# Patient Record
Sex: FEMALE | Race: WHITE | NOT HISPANIC OR LATINO | Employment: OTHER | URBAN - METROPOLITAN AREA
[De-identification: names, ages, dates, MRNs, and addresses within clinical notes are randomized per-mention and may not be internally consistent; named-entity substitution may affect disease eponyms.]

---

## 2017-01-30 RX ORDER — ERGOCALCIFEROL 1.25 MG/1
CAPSULE ORAL
Qty: 12 CAPSULE | Refills: 2 | Status: SHIPPED | OUTPATIENT
Start: 2017-01-30 | End: 2018-03-01 | Stop reason: SDUPTHER

## 2017-01-30 RX ORDER — ERGOCALCIFEROL 1.25 MG/1
CAPSULE ORAL
Qty: 12 CAPSULE | Refills: 2 | Status: SHIPPED | OUTPATIENT
Start: 2017-01-30 | End: 2017-01-30 | Stop reason: SDUPTHER

## 2017-02-03 RX ORDER — LISINOPRIL 5 MG/1
TABLET ORAL
Qty: 90 TABLET | Refills: 2 | Status: SHIPPED | OUTPATIENT
Start: 2017-02-03 | End: 2018-03-19 | Stop reason: SDUPTHER

## 2017-02-13 DIAGNOSIS — R20.0 NUMBNESS OF RIGHT HAND: Primary | ICD-10-CM

## 2017-02-15 DIAGNOSIS — R20.2 HAND PARESTHESIA, UNSPECIFIED LATERALITY: Primary | ICD-10-CM

## 2017-02-15 RX ORDER — GABAPENTIN 100 MG/1
CAPSULE ORAL
Qty: 90 CAPSULE | Refills: 2 | Status: SHIPPED | OUTPATIENT
Start: 2017-02-15 | End: 2017-05-31

## 2017-03-03 ENCOUNTER — OFFICE VISIT (OUTPATIENT)
Dept: INTERNAL MEDICINE | Facility: CLINIC | Age: 67
End: 2017-03-03

## 2017-03-03 VITALS
HEART RATE: 68 BPM | DIASTOLIC BLOOD PRESSURE: 80 MMHG | BODY MASS INDEX: 21.68 KG/M2 | SYSTOLIC BLOOD PRESSURE: 140 MMHG | HEIGHT: 64 IN | WEIGHT: 127 LBS

## 2017-03-03 DIAGNOSIS — E55.9 VITAMIN D DEFICIENCY: ICD-10-CM

## 2017-03-03 DIAGNOSIS — E78.49 OTHER HYPERLIPIDEMIA: Primary | ICD-10-CM

## 2017-03-03 DIAGNOSIS — M79.601 RIGHT ARM PAIN: ICD-10-CM

## 2017-03-03 DIAGNOSIS — K57.30 DIVERTICULOSIS OF LARGE INTESTINE WITHOUT HEMORRHAGE: ICD-10-CM

## 2017-03-03 DIAGNOSIS — F32.89 OTHER DEPRESSION: ICD-10-CM

## 2017-03-03 DIAGNOSIS — I10 ESSENTIAL HYPERTENSION: ICD-10-CM

## 2017-03-03 DIAGNOSIS — F41.9 ANXIETY: ICD-10-CM

## 2017-03-03 PROCEDURE — 99214 OFFICE O/P EST MOD 30 MIN: CPT | Performed by: INTERNAL MEDICINE

## 2017-03-03 NOTE — PROGRESS NOTES
Patient is a 66 y.o. female who is here for a follow up of chronic conditions.  Chief Complaint   Patient presents with   • Hypertension   • Hyperlipidemia         HPI:  Here for f/u.  Having pain in R arm.  Described as an ache and pain.  Some improvement with gabapentin.  Having to add advil.  Has EMG soon.  Sleep is good with the gabapentin.  No fever or chills. No abdominal pains.     History:    Patient Active Problem List   Diagnosis   • Anxiety   • Depression   • Diverticulosis of intestine   • Hyperlipidemia   • Hypertension   • Vitamin D deficiency   • Strain of lumbar paraspinal muscle   • Right arm pain       No past medical history on file.    Past Surgical History   Procedure Laterality Date   • Hysterectomy         Current Outpatient Prescriptions on File Prior to Visit   Medication Sig   • aspirin 81 MG tablet Take 1 tablet by mouth daily.   • atorvastatin (LIPITOR) 10 MG tablet TAKE 1 TABLET EVERY NIGHT AT BEDTIME   • cyclobenzaprine (FLEXERIL) 10 MG tablet Take 1 tablet by mouth 3 (three) times a day as needed for muscle spasms.   • estradiol (ESTRACE) 0.5 MG tablet Take 1 tablet by mouth daily.   • FLUoxetine (PROzac) 20 MG capsule TAKE 1 TABLET EVERY DAY   • gabapentin (NEURONTIN) 100 MG capsule 1 in am and 2 at bedtime   • lisinopril (PRINIVIL,ZESTRIL) 5 MG tablet TAKE 1 TABLET EVERY DAY   • meloxicam (MOBIC) 7.5 MG tablet Take 1 tablet by mouth daily as needed (back pain).   • ondansetron (ZOFRAN) 4 MG tablet Take 1 tablet by mouth Every 8 (Eight) Hours As Needed for nausea or vomiting.   • triamcinolone (KENALOG) 0.5 % cream Apply  topically 2 (Two) Times a Day.   • vitamin D (ERGOCALCIFEROL) 01349 UNITS capsule capsule TAKE 1 CAPSULE BY MOUTH EVERY WEEK     No current facility-administered medications on file prior to visit.        Family History   Problem Relation Age of Onset   • Heart disease Father        Social History     Social History   • Marital status:      Spouse name: N/A  "  • Number of children: N/A   • Years of education: N/A     Occupational History   • Not on file.     Social History Main Topics   • Smoking status: Never Smoker   • Smokeless tobacco: Not on file   • Alcohol use Not on file   • Drug use: Not on file   • Sexual activity: Not on file     Other Topics Concern   • Not on file     Social History Narrative         ROS:    Review of Systems   Constitutional: Positive for fatigue. Negative for chills, diaphoresis, fever and unexpected weight change.   HENT: Negative for congestion, ear pain, hearing loss, nosebleeds, postnasal drip, sinus pressure and sore throat.    Eyes: Negative for pain, discharge and itching.   Respiratory: Negative for cough, chest tightness, shortness of breath and wheezing.    Cardiovascular: Negative for chest pain, palpitations and leg swelling.   Gastrointestinal: Negative for abdominal distention, abdominal pain, blood in stool, constipation, diarrhea, nausea and vomiting.        10/15 colonoscopy without polyps   Endocrine: Negative for polydipsia and polyuria.   Genitourinary: Negative for difficulty urinating, dysuria, frequency and hematuria.   Musculoskeletal: Positive for arthralgias. Negative for back pain, gait problem, joint swelling and myalgias.   Skin: Negative for rash and wound.   Neurological: Positive for numbness. Negative for dizziness, syncope, weakness and headaches.        Memory problems   Psychiatric/Behavioral: Positive for dysphoric mood and sleep disturbance. The patient is not nervous/anxious.        Visit Vitals   • /80   • Pulse 68   • Ht 64\" (162.6 cm)   • Wt 127 lb (57.6 kg)   • BMI 21.8 kg/m2       Physical Exam:    Physical Exam   Constitutional: She is oriented to person, place, and time. She appears well-developed and well-nourished.   HENT:   Head: Normocephalic and atraumatic.   Right Ear: External ear normal.   Left Ear: External ear normal.   Mouth/Throat: Oropharynx is clear and moist.   Eyes: " Conjunctivae and EOM are normal.   Neck: Normal range of motion. Neck supple.   Cardiovascular: Normal rate, regular rhythm and normal heart sounds.    Pulmonary/Chest: Effort normal and breath sounds normal.   Abdominal: Soft. Bowel sounds are normal.   Musculoskeletal: Normal range of motion. She exhibits no tenderness.   Lymphadenopathy:     She has no cervical adenopathy.   Neurological: She is alert and oriented to person, place, and time.   Skin: Skin is warm and dry.   Psychiatric: She has a normal mood and affect. Her behavior is normal. Thought content normal.       Procedure:      Discussion/Summary:  memory loss-improved  hyperlipidemia-labs dw patient  depression-stable on prozac  htn-stable  vit d def-cont replacement  Lumbar strain-advised cont HEP  Diverticulosis-increase fiber  Insomnia-benedryl prn  Right arm pain-f/u EMG and cont gabapentin      labs noted and dw patient      Current Outpatient Prescriptions:   •  aspirin 81 MG tablet, Take 1 tablet by mouth daily., Disp: , Rfl:   •  atorvastatin (LIPITOR) 10 MG tablet, TAKE 1 TABLET EVERY NIGHT AT BEDTIME, Disp: 30 tablet, Rfl: 5  •  cyclobenzaprine (FLEXERIL) 10 MG tablet, Take 1 tablet by mouth 3 (three) times a day as needed for muscle spasms., Disp: 30 tablet, Rfl: 2  •  estradiol (ESTRACE) 0.5 MG tablet, Take 1 tablet by mouth daily., Disp: , Rfl:   •  FLUoxetine (PROzac) 20 MG capsule, TAKE 1 TABLET EVERY DAY, Disp: 90 capsule, Rfl: 2  •  gabapentin (NEURONTIN) 100 MG capsule, 1 in am and 2 at bedtime, Disp: 90 capsule, Rfl: 2  •  lisinopril (PRINIVIL,ZESTRIL) 5 MG tablet, TAKE 1 TABLET EVERY DAY, Disp: 90 tablet, Rfl: 2  •  meloxicam (MOBIC) 7.5 MG tablet, Take 1 tablet by mouth daily as needed (back pain)., Disp: 30 tablet, Rfl: 2  •  ondansetron (ZOFRAN) 4 MG tablet, Take 1 tablet by mouth Every 8 (Eight) Hours As Needed for nausea or vomiting., Disp: 30 tablet, Rfl: 0  •  triamcinolone (KENALOG) 0.5 % cream, Apply  topically 2 (Two)  Times a Day., Disp: 30 g, Rfl: 1  •  vitamin D (ERGOCALCIFEROL) 86244 UNITS capsule capsule, TAKE 1 CAPSULE BY MOUTH EVERY WEEK, Disp: 12 capsule, Rfl: 2        Rebecca was seen today for hypertension and hyperlipidemia.    Diagnoses and all orders for this visit:    Other hyperlipidemia    Essential hypertension    Diverticulosis of large intestine without hemorrhage    Vitamin D deficiency    Anxiety    Other depression    Right arm pain

## 2017-03-07 ENCOUNTER — PROCEDURE VISIT (OUTPATIENT)
Dept: NEUROLOGY | Facility: CLINIC | Age: 67
End: 2017-03-07

## 2017-03-07 DIAGNOSIS — M54.12 CERVICAL RADICULOPATHY: ICD-10-CM

## 2017-03-07 DIAGNOSIS — G56.01 CARPAL TUNNEL SYNDROME OF RIGHT WRIST: Primary | ICD-10-CM

## 2017-03-07 PROCEDURE — 95908 NRV CNDJ TST 3-4 STUDIES: CPT | Performed by: PSYCHIATRY & NEUROLOGY

## 2017-03-07 PROCEDURE — 95886 MUSC TEST DONE W/N TEST COMP: CPT | Performed by: PSYCHIATRY & NEUROLOGY

## 2017-03-07 NOTE — PROGRESS NOTES
Procedure   Procedures   EMG/NCVs of right arm suggest mild CTS. Symptoms and exam suggest C4-5 radiculopathy.

## 2017-04-11 RX ORDER — PROMETHAZINE HYDROCHLORIDE 25 MG/1
25 TABLET ORAL EVERY 8 HOURS PRN
Qty: 30 TABLET | Refills: 1 | Status: SHIPPED | OUTPATIENT
Start: 2017-04-11 | End: 2017-05-31

## 2017-05-22 RX ORDER — FLUOXETINE HYDROCHLORIDE 20 MG/1
CAPSULE ORAL
Qty: 90 CAPSULE | Refills: 1 | Status: SHIPPED | OUTPATIENT
Start: 2017-05-22 | End: 2017-05-31 | Stop reason: SDUPTHER

## 2017-05-31 ENCOUNTER — OFFICE VISIT (OUTPATIENT)
Dept: INTERNAL MEDICINE | Facility: CLINIC | Age: 67
End: 2017-05-31

## 2017-05-31 VITALS
SYSTOLIC BLOOD PRESSURE: 124 MMHG | WEIGHT: 126 LBS | HEART RATE: 68 BPM | BODY MASS INDEX: 21.51 KG/M2 | DIASTOLIC BLOOD PRESSURE: 70 MMHG | HEIGHT: 64 IN

## 2017-05-31 DIAGNOSIS — H91.93 BILATERAL HEARING LOSS: ICD-10-CM

## 2017-05-31 DIAGNOSIS — F32.89 OTHER DEPRESSION: ICD-10-CM

## 2017-05-31 DIAGNOSIS — F41.9 ANXIETY: ICD-10-CM

## 2017-05-31 DIAGNOSIS — I10 ESSENTIAL HYPERTENSION: ICD-10-CM

## 2017-05-31 DIAGNOSIS — J30.1 SEASONAL ALLERGIC RHINITIS DUE TO POLLEN: ICD-10-CM

## 2017-05-31 DIAGNOSIS — E55.9 VITAMIN D DEFICIENCY: ICD-10-CM

## 2017-05-31 DIAGNOSIS — E78.49 OTHER HYPERLIPIDEMIA: Primary | ICD-10-CM

## 2017-05-31 DIAGNOSIS — K57.30 DIVERTICULOSIS OF LARGE INTESTINE WITHOUT HEMORRHAGE: ICD-10-CM

## 2017-05-31 LAB
25(OH)D3 SERPL-MCNC: 88.5 NG/ML
ALBUMIN SERPL-MCNC: 4.2 G/DL (ref 3.2–4.8)
ALBUMIN/GLOB SERPL: 1.2 G/DL (ref 1.5–2.5)
ALP SERPL-CCNC: 104 U/L (ref 25–100)
ALT SERPL W P-5'-P-CCNC: 19 U/L (ref 7–40)
ANION GAP SERPL CALCULATED.3IONS-SCNC: 16 MMOL/L (ref 3–11)
ARTICHOKE IGE QN: 134 MG/DL (ref 0–130)
AST SERPL-CCNC: 27 U/L (ref 0–33)
BILIRUB SERPL-MCNC: 0.5 MG/DL (ref 0.3–1.2)
BUN BLD-MCNC: 15 MG/DL (ref 9–23)
BUN/CREAT SERPL: 18.8 (ref 7–25)
CALCIUM SPEC-SCNC: 9.6 MG/DL (ref 8.7–10.4)
CHLORIDE SERPL-SCNC: 106 MMOL/L (ref 99–109)
CHOLEST SERPL-MCNC: 230 MG/DL (ref 0–200)
CO2 SERPL-SCNC: 19 MMOL/L (ref 20–31)
CREAT BLD-MCNC: 0.8 MG/DL (ref 0.6–1.3)
DEPRECATED RDW RBC AUTO: 44.1 FL (ref 37–54)
ERYTHROCYTE [DISTWIDTH] IN BLOOD BY AUTOMATED COUNT: 12.7 % (ref 11.3–14.5)
GFR SERPL CREATININE-BSD FRML MDRD: 71 ML/MIN/1.73
GLOBULIN UR ELPH-MCNC: 3.4 GM/DL
GLUCOSE BLD-MCNC: 83 MG/DL (ref 70–100)
HCT VFR BLD AUTO: 39.1 % (ref 34.5–44)
HDLC SERPL-MCNC: 75 MG/DL (ref 40–60)
HGB BLD-MCNC: 12.6 G/DL (ref 11.5–15.5)
MCH RBC QN AUTO: 30.6 PG (ref 27–31)
MCHC RBC AUTO-ENTMCNC: 32.2 G/DL (ref 32–36)
MCV RBC AUTO: 94.9 FL (ref 80–99)
PLATELET # BLD AUTO: 291 10*3/MM3 (ref 150–450)
PMV BLD AUTO: 10.4 FL (ref 6–12)
POTASSIUM BLD-SCNC: 4.7 MMOL/L (ref 3.5–5.5)
PROT SERPL-MCNC: 7.6 G/DL (ref 5.7–8.2)
RBC # BLD AUTO: 4.12 10*6/MM3 (ref 3.89–5.14)
SODIUM BLD-SCNC: 141 MMOL/L (ref 132–146)
TRIGL SERPL-MCNC: 93 MG/DL (ref 0–150)
TSH SERPL DL<=0.05 MIU/L-ACNC: 1.93 MIU/ML (ref 0.35–5.35)
VIT B12 BLD-MCNC: 561 PG/ML (ref 211–911)
WBC NRBC COR # BLD: 6.59 10*3/MM3 (ref 3.5–10.8)

## 2017-05-31 PROCEDURE — 84443 ASSAY THYROID STIM HORMONE: CPT | Performed by: INTERNAL MEDICINE

## 2017-05-31 PROCEDURE — 82607 VITAMIN B-12: CPT | Performed by: INTERNAL MEDICINE

## 2017-05-31 PROCEDURE — 80053 COMPREHEN METABOLIC PANEL: CPT | Performed by: INTERNAL MEDICINE

## 2017-05-31 PROCEDURE — 85027 COMPLETE CBC AUTOMATED: CPT | Performed by: INTERNAL MEDICINE

## 2017-05-31 PROCEDURE — 82306 VITAMIN D 25 HYDROXY: CPT | Performed by: INTERNAL MEDICINE

## 2017-05-31 PROCEDURE — 99214 OFFICE O/P EST MOD 30 MIN: CPT | Performed by: INTERNAL MEDICINE

## 2017-05-31 PROCEDURE — 80061 LIPID PANEL: CPT | Performed by: INTERNAL MEDICINE

## 2017-05-31 RX ORDER — FLUOXETINE HYDROCHLORIDE 40 MG/1
40 CAPSULE ORAL DAILY
Qty: 90 CAPSULE | Refills: 3 | Status: SHIPPED | OUTPATIENT
Start: 2017-05-31 | End: 2018-03-01

## 2017-05-31 RX ORDER — FLUTICASONE PROPIONATE 50 MCG
2 SPRAY, SUSPENSION (ML) NASAL DAILY
Qty: 1 EACH | Refills: 0 | Status: SHIPPED | OUTPATIENT
Start: 2017-05-31 | End: 2017-07-05 | Stop reason: SDUPTHER

## 2017-07-05 DIAGNOSIS — J30.1 SEASONAL ALLERGIC RHINITIS DUE TO POLLEN: ICD-10-CM

## 2017-07-05 RX ORDER — FLUTICASONE PROPIONATE 50 MCG
SPRAY, SUSPENSION (ML) NASAL
Qty: 1 BOTTLE | Refills: 5 | Status: SHIPPED | OUTPATIENT
Start: 2017-07-05 | End: 2021-01-01

## 2017-07-20 ENCOUNTER — TELEPHONE (OUTPATIENT)
Dept: INTERNAL MEDICINE | Facility: CLINIC | Age: 67
End: 2017-07-20

## 2017-07-20 RX ORDER — HYDROXYZINE HYDROCHLORIDE 25 MG/1
25 TABLET, FILM COATED ORAL 3 TIMES DAILY PRN
Qty: 30 TABLET | Refills: 0 | Status: SHIPPED | OUTPATIENT
Start: 2017-07-20 | End: 2017-07-27 | Stop reason: SDUPTHER

## 2017-07-20 NOTE — TELEPHONE ENCOUNTER
RAUL WANTS TO KNOW IF DR. HERNANDEZ WILL CALL HER IN SOME MEDS FOR POSION IVY.SHE HAS TRIED EVERYTHING OVER THE COUNTER.

## 2017-07-27 RX ORDER — HYDROXYZINE HYDROCHLORIDE 25 MG/1
TABLET, FILM COATED ORAL
Qty: 45 TABLET | Refills: 0 | Status: SHIPPED | OUTPATIENT
Start: 2017-07-27 | End: 2019-09-03

## 2017-08-30 ENCOUNTER — OFFICE VISIT (OUTPATIENT)
Dept: INTERNAL MEDICINE | Facility: CLINIC | Age: 67
End: 2017-08-30

## 2017-08-30 VITALS
DIASTOLIC BLOOD PRESSURE: 70 MMHG | SYSTOLIC BLOOD PRESSURE: 120 MMHG | HEIGHT: 64 IN | WEIGHT: 124 LBS | HEART RATE: 64 BPM | BODY MASS INDEX: 21.17 KG/M2

## 2017-08-30 DIAGNOSIS — F41.9 ANXIETY: ICD-10-CM

## 2017-08-30 DIAGNOSIS — K57.30 DIVERTICULOSIS OF LARGE INTESTINE WITHOUT HEMORRHAGE: ICD-10-CM

## 2017-08-30 DIAGNOSIS — E78.49 OTHER HYPERLIPIDEMIA: Primary | ICD-10-CM

## 2017-08-30 DIAGNOSIS — F32.89 OTHER DEPRESSION: ICD-10-CM

## 2017-08-30 DIAGNOSIS — I10 ESSENTIAL HYPERTENSION: ICD-10-CM

## 2017-08-30 DIAGNOSIS — J30.1 SEASONAL ALLERGIC RHINITIS DUE TO POLLEN: ICD-10-CM

## 2017-08-30 DIAGNOSIS — E55.9 VITAMIN D DEFICIENCY: ICD-10-CM

## 2017-08-30 LAB
ALBUMIN SERPL-MCNC: 4 G/DL (ref 3.2–4.8)
ALBUMIN/GLOB SERPL: 1.4 G/DL (ref 1.5–2.5)
ALP SERPL-CCNC: 133 U/L (ref 25–100)
ALT SERPL W P-5'-P-CCNC: 19 U/L (ref 7–40)
ANION GAP SERPL CALCULATED.3IONS-SCNC: 3 MMOL/L (ref 3–11)
ARTICHOKE IGE QN: 156 MG/DL (ref 0–130)
AST SERPL-CCNC: 25 U/L (ref 0–33)
BILIRUB SERPL-MCNC: 0.4 MG/DL (ref 0.3–1.2)
BUN BLD-MCNC: 12 MG/DL (ref 9–23)
BUN/CREAT SERPL: 13.3 (ref 7–25)
CALCIUM SPEC-SCNC: 9.2 MG/DL (ref 8.7–10.4)
CHLORIDE SERPL-SCNC: 103 MMOL/L (ref 99–109)
CHOLEST SERPL-MCNC: 211 MG/DL (ref 0–200)
CO2 SERPL-SCNC: 32 MMOL/L (ref 20–31)
CREAT BLD-MCNC: 0.9 MG/DL (ref 0.6–1.3)
GFR SERPL CREATININE-BSD FRML MDRD: 62 ML/MIN/1.73
GLOBULIN UR ELPH-MCNC: 2.8 GM/DL
GLUCOSE BLD-MCNC: 84 MG/DL (ref 70–100)
HDLC SERPL-MCNC: 57 MG/DL (ref 40–60)
POTASSIUM BLD-SCNC: 4.4 MMOL/L (ref 3.5–5.5)
PROT SERPL-MCNC: 6.8 G/DL (ref 5.7–8.2)
SODIUM BLD-SCNC: 138 MMOL/L (ref 132–146)
TRIGL SERPL-MCNC: 90 MG/DL (ref 0–150)

## 2017-08-30 PROCEDURE — 80061 LIPID PANEL: CPT | Performed by: INTERNAL MEDICINE

## 2017-08-30 PROCEDURE — 99214 OFFICE O/P EST MOD 30 MIN: CPT | Performed by: INTERNAL MEDICINE

## 2017-08-30 PROCEDURE — 80053 COMPREHEN METABOLIC PANEL: CPT | Performed by: INTERNAL MEDICINE

## 2017-08-30 PROCEDURE — 36415 COLL VENOUS BLD VENIPUNCTURE: CPT | Performed by: INTERNAL MEDICINE

## 2017-08-30 NOTE — PROGRESS NOTES
Patient is a 66 y.o. female who is here for a follow up of chronic conditions.  Chief Complaint   Patient presents with   • Hypertension   • Hyperlipidemia         HPI:  Here for f/u.  Doing good.  Memory is stable.  Still on the prozac 20 mg and 1/2 of the lipitor.  Back is doing well.  No nausea or emesis.  Occasional dizziness.  No lightheadedness.     History:    Patient Active Problem List   Diagnosis   • Anxiety   • Depression   • Diverticulosis of intestine   • Hyperlipidemia   • Hypertension   • Vitamin D deficiency   • Strain of lumbar paraspinal muscle   • Right arm pain   • Seasonal allergic rhinitis due to pollen   • Bilateral hearing loss       No past medical history on file.    Past Surgical History:   Procedure Laterality Date   • HYSTERECTOMY         Current Outpatient Prescriptions on File Prior to Visit   Medication Sig   • aspirin 81 MG tablet Take 1 tablet by mouth daily.   • atorvastatin (LIPITOR) 10 MG tablet TAKE 1 TABLET EVERY NIGHT AT BEDTIME   • cyclobenzaprine (FLEXERIL) 10 MG tablet Take 1 tablet by mouth 3 (three) times a day as needed for muscle spasms.   • estradiol (ESTRACE) 0.5 MG tablet Take 1 tablet by mouth daily.   • FLUoxetine (PROzac) 40 MG capsule Take 1 capsule by mouth Daily.   • fluticasone (FLONASE) 50 MCG/ACT nasal spray 2 SPRAYS INTO EACH NOSTRIL DAILY FOR 30 DAYS.   • hydrOXYzine (ATARAX) 25 MG tablet 1-2 po tid prn   • lisinopril (PRINIVIL,ZESTRIL) 5 MG tablet TAKE 1 TABLET EVERY DAY   • meloxicam (MOBIC) 7.5 MG tablet Take 1 tablet by mouth daily as needed (back pain).   • triamcinolone (KENALOG) 0.5 % cream Apply  topically 2 (Two) Times a Day.   • vitamin D (ERGOCALCIFEROL) 04451 UNITS capsule capsule TAKE 1 CAPSULE BY MOUTH EVERY WEEK     No current facility-administered medications on file prior to visit.        Family History   Problem Relation Age of Onset   • Heart disease Father        Social History     Social History   • Marital status:      Spouse  "name: N/A   • Number of children: N/A   • Years of education: N/A     Occupational History   • Not on file.     Social History Main Topics   • Smoking status: Never Smoker   • Smokeless tobacco: Not on file   • Alcohol use Not on file   • Drug use: Not on file   • Sexual activity: Not on file     Other Topics Concern   • Not on file     Social History Narrative         ROS:    Review of Systems   Constitutional: Positive for fatigue. Negative for chills, diaphoresis, fever and unexpected weight change.   HENT: Positive for hearing loss. Negative for congestion, ear pain, nosebleeds, postnasal drip, sinus pressure and sore throat.    Eyes: Negative for pain, discharge and itching.   Respiratory: Negative for cough, chest tightness, shortness of breath and wheezing.    Cardiovascular: Negative for chest pain, palpitations and leg swelling.   Gastrointestinal: Negative for abdominal distention, abdominal pain, blood in stool, constipation, diarrhea, nausea and vomiting.        10/15 colonoscopy without polyps   Endocrine: Negative for polydipsia and polyuria.   Genitourinary: Negative for difficulty urinating, dysuria, frequency and hematuria.   Musculoskeletal: Positive for arthralgias. Negative for back pain, gait problem, joint swelling and myalgias.   Skin: Negative for rash and wound.   Allergic/Immunologic: Positive for environmental allergies.   Neurological: Negative for dizziness, syncope, weakness, numbness and headaches.        Memory problems   Psychiatric/Behavioral: Positive for dysphoric mood and sleep disturbance. The patient is not nervous/anxious.        /70  Pulse 64  Ht 64\" (162.6 cm)  Wt 124 lb (56.2 kg)  BMI 21.28 kg/m2    Physical Exam:    Physical Exam   Constitutional: She is oriented to person, place, and time. She appears well-developed and well-nourished.   HENT:   Head: Normocephalic and atraumatic.   Right Ear: External ear normal.   Left Ear: External ear normal.   Mouth/Throat: " Oropharynx is clear and moist.   Eyes: Conjunctivae and EOM are normal.   Neck: Normal range of motion. Neck supple.   Cardiovascular: Normal rate, regular rhythm and normal heart sounds.    Pulmonary/Chest: Effort normal and breath sounds normal.   Abdominal: Soft. Bowel sounds are normal.   Musculoskeletal: Normal range of motion. She exhibits no tenderness.   Lymphadenopathy:     She has no cervical adenopathy.   Neurological: She is alert and oriented to person, place, and time.   Skin: Skin is warm and dry.   Psychiatric: She has a normal mood and affect. Her behavior is normal. Thought content normal.       Procedure:      Discussion/Summary:  memory loss-hearing eval noted  Hearing loss-see above  hyperlipidemia-labs today  depression-increase prozac to 40 mg  htn-stable  vit d def-cont replacement  Lumbar strain-advised cont HEP  Diverticulosis-increase fiber  Insomnia-melatonin prn  Allergic rhinitis-flonase ruma  Right arm pain-improved      labs noted and dw patient, advised to increase lipitor to whole pill      Current Outpatient Prescriptions:   •  aspirin 81 MG tablet, Take 1 tablet by mouth daily., Disp: , Rfl:   •  atorvastatin (LIPITOR) 10 MG tablet, TAKE 1 TABLET EVERY NIGHT AT BEDTIME, Disp: 30 tablet, Rfl: 5  •  cyclobenzaprine (FLEXERIL) 10 MG tablet, Take 1 tablet by mouth 3 (three) times a day as needed for muscle spasms., Disp: 30 tablet, Rfl: 2  •  estradiol (ESTRACE) 0.5 MG tablet, Take 1 tablet by mouth daily., Disp: , Rfl:   •  FLUoxetine (PROzac) 40 MG capsule, Take 1 capsule by mouth Daily., Disp: 90 capsule, Rfl: 3  •  fluticasone (FLONASE) 50 MCG/ACT nasal spray, 2 SPRAYS INTO EACH NOSTRIL DAILY FOR 30 DAYS., Disp: 1 bottle, Rfl: 5  •  hydrOXYzine (ATARAX) 25 MG tablet, 1-2 po tid prn, Disp: 45 tablet, Rfl: 0  •  lisinopril (PRINIVIL,ZESTRIL) 5 MG tablet, TAKE 1 TABLET EVERY DAY, Disp: 90 tablet, Rfl: 2  •  meloxicam (MOBIC) 7.5 MG tablet, Take 1 tablet by mouth daily as needed (back  pain)., Disp: 30 tablet, Rfl: 2  •  triamcinolone (KENALOG) 0.5 % cream, Apply  topically 2 (Two) Times a Day., Disp: 30 g, Rfl: 1  •  vitamin D (ERGOCALCIFEROL) 72625 UNITS capsule capsule, TAKE 1 CAPSULE BY MOUTH EVERY WEEK, Disp: 12 capsule, Rfl: 2        Rebecca was seen today for hypertension and hyperlipidemia.    Diagnoses and all orders for this visit:    Other hyperlipidemia  -     Comprehensive Metabolic Panel  -     Lipid Panel    Essential hypertension    Vitamin D deficiency    Diverticulosis of large intestine without hemorrhage    Anxiety    Other depression    Seasonal allergic rhinitis due to pollen

## 2017-11-27 RX ORDER — ATORVASTATIN CALCIUM 10 MG/1
TABLET, FILM COATED ORAL
Qty: 30 TABLET | Refills: 5 | Status: SHIPPED | OUTPATIENT
Start: 2017-11-27 | End: 2018-04-17 | Stop reason: SDUPTHER

## 2017-11-30 ENCOUNTER — OFFICE VISIT (OUTPATIENT)
Dept: INTERNAL MEDICINE | Facility: CLINIC | Age: 67
End: 2017-11-30

## 2017-11-30 VITALS
WEIGHT: 123 LBS | BODY MASS INDEX: 21 KG/M2 | HEIGHT: 64 IN | SYSTOLIC BLOOD PRESSURE: 110 MMHG | DIASTOLIC BLOOD PRESSURE: 78 MMHG | HEART RATE: 68 BPM

## 2017-11-30 DIAGNOSIS — K57.30 DIVERTICULOSIS OF LARGE INTESTINE WITHOUT HEMORRHAGE: ICD-10-CM

## 2017-11-30 DIAGNOSIS — H91.93 BILATERAL HEARING LOSS, UNSPECIFIED HEARING LOSS TYPE: ICD-10-CM

## 2017-11-30 DIAGNOSIS — J30.1 SEASONAL ALLERGIC RHINITIS DUE TO POLLEN, UNSPECIFIED CHRONICITY: ICD-10-CM

## 2017-11-30 DIAGNOSIS — I10 ESSENTIAL HYPERTENSION: Primary | ICD-10-CM

## 2017-11-30 DIAGNOSIS — E78.49 OTHER HYPERLIPIDEMIA: ICD-10-CM

## 2017-11-30 DIAGNOSIS — E55.9 VITAMIN D DEFICIENCY: ICD-10-CM

## 2017-11-30 DIAGNOSIS — R41.3 MEMORY IMPAIRMENT: ICD-10-CM

## 2017-11-30 DIAGNOSIS — F32.89 OTHER DEPRESSION: ICD-10-CM

## 2017-11-30 DIAGNOSIS — R79.89 ABNORMAL LIVER FUNCTION TEST: ICD-10-CM

## 2017-11-30 LAB
ALBUMIN SERPL-MCNC: 4.3 G/DL (ref 3.2–4.8)
ALBUMIN/GLOB SERPL: 1.5 G/DL (ref 1.5–2.5)
ALP SERPL-CCNC: 122 U/L (ref 25–100)
ALT SERPL W P-5'-P-CCNC: 65 U/L (ref 7–40)
ANION GAP SERPL CALCULATED.3IONS-SCNC: 8 MMOL/L (ref 3–11)
ARTICHOKE IGE QN: 147 MG/DL (ref 0–130)
AST SERPL-CCNC: 50 U/L (ref 0–33)
BILIRUB SERPL-MCNC: 0.5 MG/DL (ref 0.3–1.2)
BUN BLD-MCNC: 15 MG/DL (ref 9–23)
BUN/CREAT SERPL: 18.8 (ref 7–25)
CALCIUM SPEC-SCNC: 9.3 MG/DL (ref 8.7–10.4)
CHLORIDE SERPL-SCNC: 105 MMOL/L (ref 99–109)
CHOLEST SERPL-MCNC: 221 MG/DL (ref 0–200)
CO2 SERPL-SCNC: 28 MMOL/L (ref 20–31)
CREAT BLD-MCNC: 0.8 MG/DL (ref 0.6–1.3)
GFR SERPL CREATININE-BSD FRML MDRD: 71 ML/MIN/1.73
GLOBULIN UR ELPH-MCNC: 2.8 GM/DL
GLUCOSE BLD-MCNC: 84 MG/DL (ref 70–100)
HDLC SERPL-MCNC: 69 MG/DL (ref 40–60)
POTASSIUM BLD-SCNC: 4.5 MMOL/L (ref 3.5–5.5)
PROT SERPL-MCNC: 7.1 G/DL (ref 5.7–8.2)
SODIUM BLD-SCNC: 141 MMOL/L (ref 132–146)
TRIGL SERPL-MCNC: 112 MG/DL (ref 0–150)

## 2017-11-30 PROCEDURE — 80061 LIPID PANEL: CPT | Performed by: INTERNAL MEDICINE

## 2017-11-30 PROCEDURE — 99214 OFFICE O/P EST MOD 30 MIN: CPT | Performed by: INTERNAL MEDICINE

## 2017-11-30 PROCEDURE — 80053 COMPREHEN METABOLIC PANEL: CPT | Performed by: INTERNAL MEDICINE

## 2017-11-30 RX ORDER — DONEPEZIL HYDROCHLORIDE 5 MG/1
5 TABLET, FILM COATED ORAL NIGHTLY
Qty: 30 TABLET | Refills: 5 | Status: SHIPPED | OUTPATIENT
Start: 2017-11-30 | End: 2018-03-01

## 2017-11-30 NOTE — PROGRESS NOTES
Patient is a 66 y.o. female who is here for a follow up of chronic conditions.  Chief Complaint   Patient presents with   • Hypertension   • Hyperlipidemia         HPI:  Here for f/u.  Doing ok.  Still with difficulty with memory.  Sleep is difficult.  Back pain and hand pain is improved.  Appetite is good.   No dizziness or lightheadedness.  Problem is more with recent memory.     History:    Patient Active Problem List   Diagnosis   • Anxiety   • Depression   • Diverticulosis of intestine   • Hyperlipidemia   • Hypertension   • Vitamin D deficiency   • Strain of lumbar paraspinal muscle   • Right arm pain   • Seasonal allergic rhinitis due to pollen   • Bilateral hearing loss       No past medical history on file.    Past Surgical History:   Procedure Laterality Date   • HYSTERECTOMY         Current Outpatient Prescriptions on File Prior to Visit   Medication Sig   • aspirin 81 MG tablet Take 1 tablet by mouth daily.   • atorvastatin (LIPITOR) 10 MG tablet TAKE 1 TABLET EVERY NIGHT AT BEDTIME   • estradiol (ESTRACE) 0.5 MG tablet Take 1 tablet by mouth daily.   • FLUoxetine (PROzac) 40 MG capsule Take 1 capsule by mouth Daily.   • fluticasone (FLONASE) 50 MCG/ACT nasal spray 2 SPRAYS INTO EACH NOSTRIL DAILY FOR 30 DAYS.   • hydrOXYzine (ATARAX) 25 MG tablet 1-2 po tid prn   • lisinopril (PRINIVIL,ZESTRIL) 5 MG tablet TAKE 1 TABLET EVERY DAY   • meloxicam (MOBIC) 7.5 MG tablet Take 1 tablet by mouth daily as needed (back pain).   • triamcinolone (KENALOG) 0.5 % cream Apply  topically 2 (Two) Times a Day.   • vitamin D (ERGOCALCIFEROL) 56350 UNITS capsule capsule TAKE 1 CAPSULE BY MOUTH EVERY WEEK     No current facility-administered medications on file prior to visit.        Family History   Problem Relation Age of Onset   • Heart disease Father        Social History     Social History   • Marital status:      Spouse name: N/A   • Number of children: N/A   • Years of education: N/A     Occupational History  "  • Not on file.     Social History Main Topics   • Smoking status: Never Smoker   • Smokeless tobacco: Not on file   • Alcohol use Not on file   • Drug use: Not on file   • Sexual activity: Not on file     Other Topics Concern   • Not on file     Social History Narrative         ROS:    Review of Systems   Constitutional: Positive for fatigue. Negative for chills, diaphoresis, fever and unexpected weight change.   HENT: Negative for congestion, ear pain, hearing loss, nosebleeds, postnasal drip, sinus pressure and sore throat.    Eyes: Negative for pain, discharge and itching.   Respiratory: Negative for cough, chest tightness, shortness of breath and wheezing.    Cardiovascular: Negative for chest pain, palpitations and leg swelling.   Gastrointestinal: Negative for abdominal distention, abdominal pain, blood in stool, constipation, diarrhea, nausea and vomiting.        10/15 colonoscopy without polyps   Endocrine: Negative for polydipsia and polyuria.   Genitourinary: Negative for difficulty urinating, dysuria, frequency and hematuria.   Musculoskeletal: Positive for arthralgias. Negative for back pain, gait problem, joint swelling and myalgias.   Skin: Negative for rash and wound.   Allergic/Immunologic: Positive for environmental allergies.   Neurological: Negative for dizziness, syncope, weakness, numbness and headaches.        Memory problems   Psychiatric/Behavioral: Positive for decreased concentration, dysphoric mood and sleep disturbance. The patient is not nervous/anxious.        /78  Pulse 68  Ht 64\" (162.6 cm)  Wt 123 lb (55.8 kg)  BMI 21.11 kg/m2    Physical Exam:    Physical Exam   Constitutional: She is oriented to person, place, and time. She appears well-developed and well-nourished.   HENT:   Head: Normocephalic and atraumatic.   Right Ear: External ear normal.   Left Ear: External ear normal.   Mouth/Throat: Oropharynx is clear and moist.   Eyes: Conjunctivae and EOM are normal. "   Neck: Normal range of motion. Neck supple.   Cardiovascular: Normal rate, regular rhythm and normal heart sounds.    Pulmonary/Chest: Effort normal and breath sounds normal.   Abdominal: Soft. Bowel sounds are normal.   Musculoskeletal: Normal range of motion. She exhibits no tenderness.   Lymphadenopathy:     She has no cervical adenopathy.   Neurological: She is alert and oriented to person, place, and time.   MMSE   Skin: Skin is warm and dry.   Psychiatric: She has a normal mood and affect. Her behavior is normal. Thought content normal.       Procedure:      Discussion/Summary:    memory loss-trial of aricept  Hearing loss-see above  hyperlipidemia-labs today  depression-cont current tx  htn-stable  vit d def-cont replacement  Lumbar strain-advised cont HEP  Diverticulosis-increase fiber  Insomnia-melatonin prn  Allergic rhinitis-flonase ruma  Right arm pain-improved      labs noted and dw patient, will schedule RUQ US sec to elevated lft         Current Outpatient Prescriptions:   •  aspirin 81 MG tablet, Take 1 tablet by mouth daily., Disp: , Rfl:   •  atorvastatin (LIPITOR) 10 MG tablet, TAKE 1 TABLET EVERY NIGHT AT BEDTIME, Disp: 30 tablet, Rfl: 5  •  estradiol (ESTRACE) 0.5 MG tablet, Take 1 tablet by mouth daily., Disp: , Rfl:   •  FLUoxetine (PROzac) 40 MG capsule, Take 1 capsule by mouth Daily., Disp: 90 capsule, Rfl: 3  •  fluticasone (FLONASE) 50 MCG/ACT nasal spray, 2 SPRAYS INTO EACH NOSTRIL DAILY FOR 30 DAYS., Disp: 1 bottle, Rfl: 5  •  hydrOXYzine (ATARAX) 25 MG tablet, 1-2 po tid prn, Disp: 45 tablet, Rfl: 0  •  lisinopril (PRINIVIL,ZESTRIL) 5 MG tablet, TAKE 1 TABLET EVERY DAY, Disp: 90 tablet, Rfl: 2  •  meloxicam (MOBIC) 7.5 MG tablet, Take 1 tablet by mouth daily as needed (back pain)., Disp: 30 tablet, Rfl: 2  •  triamcinolone (KENALOG) 0.5 % cream, Apply  topically 2 (Two) Times a Day., Disp: 30 g, Rfl: 1  •  vitamin D (ERGOCALCIFEROL) 61773 UNITS capsule capsule, TAKE 1 CAPSULE BY MOUTH  EVERY WEEK, Disp: 12 capsule, Rfl: 2  •  donepezil (ARICEPT) 5 MG tablet, Take 1 tablet by mouth Every Night., Disp: 30 tablet, Rfl: 5        Rebecca was seen today for hypertension and hyperlipidemia.    Diagnoses and all orders for this visit:    Essential hypertension    Other hyperlipidemia  -     Comprehensive Metabolic Panel  -     Lipid Panel    Seasonal allergic rhinitis due to pollen, unspecified chronicity    Vitamin D deficiency    Diverticulosis of large intestine without hemorrhage    Bilateral hearing loss, unspecified hearing loss type    Other depression    Memory impairment  -     donepezil (ARICEPT) 5 MG tablet; Take 1 tablet by mouth Every Night.    Abnormal liver function test  -     US Abdomen Limited

## 2017-12-04 ENCOUNTER — APPOINTMENT (OUTPATIENT)
Dept: ULTRASOUND IMAGING | Facility: HOSPITAL | Age: 67
End: 2017-12-04
Attending: INTERNAL MEDICINE

## 2017-12-05 ENCOUNTER — HOSPITAL ENCOUNTER (OUTPATIENT)
Dept: ULTRASOUND IMAGING | Facility: HOSPITAL | Age: 67
Discharge: HOME | End: 2017-12-05
Attending: INTERNAL MEDICINE | Admitting: INTERNAL MEDICINE

## 2017-12-05 PROCEDURE — 76705 ECHO EXAM OF ABDOMEN: CPT

## 2017-12-06 PROBLEM — K80.20 ASYMPTOMATIC GALLSTONES: Status: ACTIVE | Noted: 2017-12-06

## 2017-12-18 ENCOUNTER — TELEPHONE (OUTPATIENT)
Dept: INTERNAL MEDICINE | Facility: CLINIC | Age: 67
End: 2017-12-18

## 2017-12-18 RX ORDER — ONDANSETRON 4 MG/1
4 TABLET, FILM COATED ORAL EVERY 8 HOURS PRN
Qty: 20 TABLET | Refills: 0 | Status: SHIPPED | OUTPATIENT
Start: 2017-12-18 | End: 2019-09-03

## 2017-12-18 NOTE — TELEPHONE ENCOUNTER
HOMER ISAACS PROBABLY HAS THE STOMACH BUG, SHE IS THROWING UP STOMACH UPSET. CAN WE CALL IN SOMETHING FOR HER. PLEASE LET US KNOW.

## 2018-01-29 ENCOUNTER — TELEPHONE (OUTPATIENT)
Dept: INTERNAL MEDICINE | Facility: CLINIC | Age: 68
End: 2018-01-29

## 2018-01-29 DIAGNOSIS — R19.7 DIARRHEA, UNSPECIFIED TYPE: Primary | ICD-10-CM

## 2018-01-29 NOTE — TELEPHONE ENCOUNTER
Spoke to claude and per dr gates advised pt to get stool studies. Will put in order for isiah kenyon

## 2018-01-29 NOTE — TELEPHONE ENCOUNTER
PATIENT IS HAVING DIARRHEA ISSUES ALONG WITH STOMACH CRAMPING THAT'S BEEN GOING ON FOR 6 WEEKS. PATIENT WANTED TO KNOW WHAT ELSE SHE SHOULD DO? PATIENT WANTED YOU TO GIVE HER A CALL.

## 2018-03-01 ENCOUNTER — OFFICE VISIT (OUTPATIENT)
Dept: INTERNAL MEDICINE | Facility: CLINIC | Age: 68
End: 2018-03-01

## 2018-03-01 VITALS
BODY MASS INDEX: 21.17 KG/M2 | WEIGHT: 124 LBS | HEIGHT: 64 IN | SYSTOLIC BLOOD PRESSURE: 120 MMHG | HEART RATE: 64 BPM | DIASTOLIC BLOOD PRESSURE: 74 MMHG

## 2018-03-01 DIAGNOSIS — J30.1 SEASONAL ALLERGIC RHINITIS DUE TO POLLEN, UNSPECIFIED CHRONICITY: ICD-10-CM

## 2018-03-01 DIAGNOSIS — S39.012S STRAIN OF LUMBAR PARASPINOUS MUSCLE, SEQUELA: ICD-10-CM

## 2018-03-01 DIAGNOSIS — K57.30 DIVERTICULOSIS OF LARGE INTESTINE WITHOUT HEMORRHAGE: ICD-10-CM

## 2018-03-01 DIAGNOSIS — F32.89 OTHER DEPRESSION: ICD-10-CM

## 2018-03-01 DIAGNOSIS — I10 ESSENTIAL HYPERTENSION: ICD-10-CM

## 2018-03-01 DIAGNOSIS — E55.9 VITAMIN D DEFICIENCY: ICD-10-CM

## 2018-03-01 DIAGNOSIS — F41.9 ANXIETY: ICD-10-CM

## 2018-03-01 DIAGNOSIS — E78.49 OTHER HYPERLIPIDEMIA: Primary | ICD-10-CM

## 2018-03-01 LAB
ALBUMIN SERPL-MCNC: 4.4 G/DL (ref 3.2–4.8)
ALBUMIN/GLOB SERPL: 1.5 G/DL (ref 1.5–2.5)
ALP SERPL-CCNC: 140 U/L (ref 25–100)
ALT SERPL W P-5'-P-CCNC: 36 U/L (ref 7–40)
ANION GAP SERPL CALCULATED.3IONS-SCNC: 7 MMOL/L (ref 3–11)
ARTICHOKE IGE QN: 120 MG/DL (ref 0–130)
AST SERPL-CCNC: 36 U/L (ref 0–33)
BILIRUB SERPL-MCNC: 0.4 MG/DL (ref 0.3–1.2)
BUN BLD-MCNC: 12 MG/DL (ref 9–23)
BUN/CREAT SERPL: 15 (ref 7–25)
CALCIUM SPEC-SCNC: 9.5 MG/DL (ref 8.7–10.4)
CHLORIDE SERPL-SCNC: 106 MMOL/L (ref 99–109)
CHOLEST SERPL-MCNC: 201 MG/DL (ref 0–200)
CO2 SERPL-SCNC: 28 MMOL/L (ref 20–31)
CREAT BLD-MCNC: 0.8 MG/DL (ref 0.6–1.3)
GFR SERPL CREATININE-BSD FRML MDRD: 71 ML/MIN/1.73
GLOBULIN UR ELPH-MCNC: 3 GM/DL
GLUCOSE BLD-MCNC: 92 MG/DL (ref 70–100)
HDLC SERPL-MCNC: 74 MG/DL (ref 40–60)
POTASSIUM BLD-SCNC: 4.3 MMOL/L (ref 3.5–5.5)
PROT SERPL-MCNC: 7.4 G/DL (ref 5.7–8.2)
SODIUM BLD-SCNC: 141 MMOL/L (ref 132–146)
TRIGL SERPL-MCNC: 99 MG/DL (ref 0–150)

## 2018-03-01 PROCEDURE — 80061 LIPID PANEL: CPT | Performed by: INTERNAL MEDICINE

## 2018-03-01 PROCEDURE — 99214 OFFICE O/P EST MOD 30 MIN: CPT | Performed by: INTERNAL MEDICINE

## 2018-03-01 PROCEDURE — 80053 COMPREHEN METABOLIC PANEL: CPT | Performed by: INTERNAL MEDICINE

## 2018-03-01 RX ORDER — ARIPIPRAZOLE 2 MG/1
2 TABLET ORAL EVERY MORNING
Qty: 30 TABLET | Refills: 5 | Status: SHIPPED | OUTPATIENT
Start: 2018-03-01 | End: 2018-04-19

## 2018-03-01 NOTE — PROGRESS NOTES
Patient is a 67 y.o. female who is here for a follow up of chronic conditions.  Chief Complaint   Patient presents with   • Hypertension   • Hyperlipidemia         HPI:    Here for f/u.  Tried the aricept, but gave her diarrhea initially.  No longer taking.  Does not feel her memory is any worst.  Wants to try conservative approached.  Feels depressed.  Stopped prozac bc it made her sleepy.  Poor sleep.  Using melatonin with some help.  BP has been good.   History:    Patient Active Problem List   Diagnosis   • Anxiety   • Depression   • Diverticulosis of intestine   • Hyperlipidemia   • Hypertension   • Vitamin D deficiency   • Strain of lumbar paraspinal muscle   • Right arm pain   • Seasonal allergic rhinitis due to pollen   • Bilateral hearing loss   • Asymptomatic gallstones       No past medical history on file.    Past Surgical History:   Procedure Laterality Date   • HYSTERECTOMY         Current Outpatient Prescriptions on File Prior to Visit   Medication Sig   • aspirin 81 MG tablet Take 1 tablet by mouth daily.   • atorvastatin (LIPITOR) 10 MG tablet TAKE 1 TABLET EVERY NIGHT AT BEDTIME   • estradiol (ESTRACE) 0.5 MG tablet Take 1 tablet by mouth daily.   • fluticasone (FLONASE) 50 MCG/ACT nasal spray 2 SPRAYS INTO EACH NOSTRIL DAILY FOR 30 DAYS.   • hydrOXYzine (ATARAX) 25 MG tablet 1-2 po tid prn   • lisinopril (PRINIVIL,ZESTRIL) 5 MG tablet TAKE 1 TABLET EVERY DAY   • meloxicam (MOBIC) 7.5 MG tablet Take 1 tablet by mouth daily as needed (back pain).   • ondansetron (ZOFRAN) 4 MG tablet Take 1 tablet by mouth Every 8 (Eight) Hours As Needed for Nausea or Vomiting.   • triamcinolone (KENALOG) 0.5 % cream Apply  topically 2 (Two) Times a Day.   • vitamin D (ERGOCALCIFEROL) 51651 UNITS capsule capsule TAKE 1 CAPSULE BY MOUTH EVERY WEEK   • [DISCONTINUED] donepezil (ARICEPT) 5 MG tablet Take 1 tablet by mouth Every Night.   • [DISCONTINUED] FLUoxetine (PROzac) 40 MG capsule Take 1 capsule by mouth Daily.  "    No current facility-administered medications on file prior to visit.        Family History   Problem Relation Age of Onset   • Heart disease Father        Social History     Social History   • Marital status:      Spouse name: N/A   • Number of children: N/A   • Years of education: N/A     Occupational History   • Not on file.     Social History Main Topics   • Smoking status: Never Smoker   • Smokeless tobacco: Not on file   • Alcohol use Not on file   • Drug use: Not on file   • Sexual activity: Not on file     Other Topics Concern   • Not on file     Social History Narrative         ROS:    Review of Systems   Constitutional: Positive for fatigue. Negative for chills, diaphoresis, fever and unexpected weight change.   HENT: Negative for congestion, ear pain, hearing loss, nosebleeds, postnasal drip, sinus pressure and sore throat.    Eyes: Negative for pain, discharge and itching.   Respiratory: Negative for cough, chest tightness, shortness of breath and wheezing.    Cardiovascular: Negative for chest pain, palpitations and leg swelling.   Gastrointestinal: Negative for abdominal distention, abdominal pain, blood in stool, constipation, diarrhea, nausea and vomiting.        10/15 colonoscopy without polyps   Endocrine: Negative for polydipsia and polyuria.   Genitourinary: Negative for difficulty urinating, dysuria, frequency and hematuria.   Musculoskeletal: Positive for arthralgias. Negative for back pain, gait problem, joint swelling and myalgias.   Skin: Negative for rash and wound.   Allergic/Immunologic: Positive for environmental allergies.   Neurological: Negative for dizziness, syncope, weakness, numbness and headaches.        Memory problems   Psychiatric/Behavioral: Positive for decreased concentration, dysphoric mood and sleep disturbance. The patient is not nervous/anxious.        /74  Pulse 64  Ht 162.6 cm (64.02\")  Wt 56.2 kg (124 lb)  BMI 21.27 kg/m2    Physical " Exam:    Physical Exam   Constitutional: She is oriented to person, place, and time. She appears well-developed and well-nourished.   HENT:   Head: Normocephalic and atraumatic.   Right Ear: External ear normal.   Left Ear: External ear normal.   Mouth/Throat: Oropharynx is clear and moist.   Eyes: Conjunctivae and EOM are normal.   Neck: Normal range of motion. Neck supple.   Cardiovascular: Normal rate, regular rhythm and normal heart sounds.    Pulmonary/Chest: Effort normal and breath sounds normal.   Abdominal: Soft. Bowel sounds are normal.   Musculoskeletal: Normal range of motion. She exhibits no tenderness.   Lymphadenopathy:     She has no cervical adenopathy.   Neurological: She is alert and oriented to person, place, and time.   Skin: Skin is warm and dry.   Psychiatric: She has a normal mood and affect. Her behavior is normal. Thought content normal.       Procedure:      Discussion/Summary:    Hearing loss-improved  hyperlipidemia-labs today  depression-change to abilify  htn-stable  vit d def-cont replacement  Lumbar strain-advised cont HEP  Diverticulosis-increase fiber  Insomnia-melatonin prn  Allergic rhinitis-flonase rx      labs noted and dw patient    Current Outpatient Prescriptions:   •  aspirin 81 MG tablet, Take 1 tablet by mouth daily., Disp: , Rfl:   •  atorvastatin (LIPITOR) 10 MG tablet, TAKE 1 TABLET EVERY NIGHT AT BEDTIME, Disp: 30 tablet, Rfl: 5  •  estradiol (ESTRACE) 0.5 MG tablet, Take 1 tablet by mouth daily., Disp: , Rfl:   •  fluticasone (FLONASE) 50 MCG/ACT nasal spray, 2 SPRAYS INTO EACH NOSTRIL DAILY FOR 30 DAYS., Disp: 1 bottle, Rfl: 5  •  hydrOXYzine (ATARAX) 25 MG tablet, 1-2 po tid prn, Disp: 45 tablet, Rfl: 0  •  lisinopril (PRINIVIL,ZESTRIL) 5 MG tablet, TAKE 1 TABLET EVERY DAY, Disp: 90 tablet, Rfl: 2  •  meloxicam (MOBIC) 7.5 MG tablet, Take 1 tablet by mouth daily as needed (back pain)., Disp: 30 tablet, Rfl: 2  •  ondansetron (ZOFRAN) 4 MG tablet, Take 1 tablet by  mouth Every 8 (Eight) Hours As Needed for Nausea or Vomiting., Disp: 20 tablet, Rfl: 0  •  triamcinolone (KENALOG) 0.5 % cream, Apply  topically 2 (Two) Times a Day., Disp: 30 g, Rfl: 1  •  vitamin D (ERGOCALCIFEROL) 01560 UNITS capsule capsule, TAKE 1 CAPSULE BY MOUTH EVERY WEEK, Disp: 12 capsule, Rfl: 2  •  ARIPiprazole (ABILIFY) 2 MG tablet, Take 1 tablet by mouth Every Morning., Disp: 30 tablet, Rfl: 5        Rebecca was seen today for hypertension and hyperlipidemia.    Diagnoses and all orders for this visit:    Other hyperlipidemia  -     Comprehensive Metabolic Panel  -     Lipid Panel    Essential hypertension    Seasonal allergic rhinitis due to pollen, unspecified chronicity    Diverticulosis of large intestine without hemorrhage    Vitamin D deficiency    Strain of lumbar paraspinous muscle, sequela    Other depression  -     ARIPiprazole (ABILIFY) 2 MG tablet; Take 1 tablet by mouth Every Morning.    Anxiety

## 2018-03-02 RX ORDER — ERGOCALCIFEROL 1.25 MG/1
CAPSULE ORAL
Qty: 12 CAPSULE | Refills: 3 | Status: SHIPPED | OUTPATIENT
Start: 2018-03-02 | End: 2019-06-17 | Stop reason: SDUPTHER

## 2018-03-19 RX ORDER — LISINOPRIL 5 MG/1
TABLET ORAL
Qty: 90 TABLET | Refills: 1 | Status: SHIPPED | OUTPATIENT
Start: 2018-03-19 | End: 2018-09-12 | Stop reason: SDUPTHER

## 2018-04-17 RX ORDER — ATORVASTATIN CALCIUM 10 MG/1
TABLET, FILM COATED ORAL
Qty: 30 TABLET | Refills: 5 | Status: SHIPPED | OUTPATIENT
Start: 2018-04-17 | End: 2018-11-23 | Stop reason: SDUPTHER

## 2018-04-19 ENCOUNTER — OFFICE VISIT (OUTPATIENT)
Dept: INTERNAL MEDICINE | Facility: CLINIC | Age: 68
End: 2018-04-19

## 2018-04-19 VITALS
HEIGHT: 64 IN | DIASTOLIC BLOOD PRESSURE: 70 MMHG | BODY MASS INDEX: 21.34 KG/M2 | HEART RATE: 60 BPM | SYSTOLIC BLOOD PRESSURE: 130 MMHG | WEIGHT: 125 LBS

## 2018-04-19 DIAGNOSIS — E78.49 OTHER HYPERLIPIDEMIA: Primary | ICD-10-CM

## 2018-04-19 DIAGNOSIS — K57.30 DIVERTICULOSIS OF LARGE INTESTINE WITHOUT HEMORRHAGE: ICD-10-CM

## 2018-04-19 DIAGNOSIS — E55.9 VITAMIN D DEFICIENCY: ICD-10-CM

## 2018-04-19 DIAGNOSIS — F41.9 ANXIETY: ICD-10-CM

## 2018-04-19 DIAGNOSIS — F32.89 OTHER DEPRESSION: ICD-10-CM

## 2018-04-19 DIAGNOSIS — I10 ESSENTIAL HYPERTENSION: ICD-10-CM

## 2018-04-19 PROCEDURE — 99214 OFFICE O/P EST MOD 30 MIN: CPT | Performed by: INTERNAL MEDICINE

## 2018-04-19 RX ORDER — DONEPEZIL HYDROCHLORIDE 5 MG/1
TABLET, FILM COATED ORAL
COMMUNITY
Start: 2018-04-17 | End: 2018-10-19

## 2018-04-19 NOTE — PROGRESS NOTES
Patient is a 67 y.o. female who is here for a follow up of chronic conditions.  Chief Complaint   Patient presents with   • Hypertension   • Hyperlipidemia         HPI:    Here for f/u.  Memory is still an issue.  Could not tolerate aricept 5 msec to diarrhea.  Would like to retry.  BP has been good.  Did not try the abilify.  No dizziness or lightheadedness.  No HA's.    History:    Patient Active Problem List   Diagnosis   • Anxiety   • Depression   • Diverticulosis of intestine   • Hyperlipidemia   • Hypertension   • Vitamin D deficiency   • Strain of lumbar paraspinal muscle   • Right arm pain   • Seasonal allergic rhinitis due to pollen   • Bilateral hearing loss   • Asymptomatic gallstones       No past medical history on file.    Past Surgical History:   Procedure Laterality Date   • HYSTERECTOMY         Current Outpatient Prescriptions on File Prior to Visit   Medication Sig   • aspirin 81 MG tablet Take 1 tablet by mouth daily.   • estradiol (ESTRACE) 0.5 MG tablet Take 1 tablet by mouth daily.   • fluticasone (FLONASE) 50 MCG/ACT nasal spray 2 SPRAYS INTO EACH NOSTRIL DAILY FOR 30 DAYS.   • hydrOXYzine (ATARAX) 25 MG tablet 1-2 po tid prn   • lisinopril (PRINIVIL,ZESTRIL) 5 MG tablet TAKE 1 TABLET EVERY DAY   • triamcinolone (KENALOG) 0.5 % cream Apply  topically 2 (Two) Times a Day.   • vitamin D (ERGOCALCIFEROL) 55935 units capsule capsule TAKE 1 CAPSULE BY MOUTH EVERY WEEK   • atorvastatin (LIPITOR) 10 MG tablet TAKE 1 TABLET EVERY NIGHT AT BEDTIME   • ondansetron (ZOFRAN) 4 MG tablet Take 1 tablet by mouth Every 8 (Eight) Hours As Needed for Nausea or Vomiting.   • [DISCONTINUED] ARIPiprazole (ABILIFY) 2 MG tablet Take 1 tablet by mouth Every Morning.   • [DISCONTINUED] meloxicam (MOBIC) 7.5 MG tablet Take 1 tablet by mouth daily as needed (back pain).     No current facility-administered medications on file prior to visit.        Family History   Problem Relation Age of Onset   • Heart disease Father   "      Social History     Social History   • Marital status:      Spouse name: N/A   • Number of children: N/A   • Years of education: N/A     Occupational History   • Not on file.     Social History Main Topics   • Smoking status: Never Smoker   • Smokeless tobacco: Not on file   • Alcohol use Not on file   • Drug use: Unknown   • Sexual activity: Not on file     Other Topics Concern   • Not on file     Social History Narrative   • No narrative on file         ROS:    Review of Systems   Constitutional: Positive for fatigue. Negative for chills, diaphoresis, fever and unexpected weight change.   HENT: Negative for congestion, ear pain, hearing loss, nosebleeds, postnasal drip, sinus pressure and sore throat.    Eyes: Negative for pain, discharge and itching.   Respiratory: Negative for cough, chest tightness, shortness of breath and wheezing.    Cardiovascular: Negative for chest pain, palpitations and leg swelling.   Gastrointestinal: Negative for abdominal distention, abdominal pain, blood in stool, constipation, diarrhea, nausea and vomiting.        10/15 colonoscopy without polyps   Endocrine: Negative for polydipsia and polyuria.   Genitourinary: Negative for difficulty urinating, dysuria, frequency and hematuria.   Musculoskeletal: Positive for arthralgias. Negative for back pain, gait problem, joint swelling and myalgias.   Skin: Negative for rash and wound.   Allergic/Immunologic: Positive for environmental allergies.   Neurological: Negative for dizziness, syncope, weakness, numbness and headaches.        Memory problems   Psychiatric/Behavioral: Positive for decreased concentration, dysphoric mood and sleep disturbance. The patient is not nervous/anxious.        /70   Pulse 60   Ht 162.6 cm (64.02\")   Wt 56.7 kg (125 lb)   BMI 21.45 kg/m²     Physical Exam:    Physical Exam   Constitutional: She is oriented to person, place, and time. She appears well-developed and well-nourished.   HENT: "   Head: Normocephalic and atraumatic.   Right Ear: External ear normal.   Left Ear: External ear normal.   Mouth/Throat: Oropharynx is clear and moist.   Eyes: Conjunctivae and EOM are normal.   Neck: Normal range of motion. Neck supple.   Cardiovascular: Normal rate, regular rhythm and normal heart sounds.    Pulmonary/Chest: Effort normal and breath sounds normal.   Abdominal: Soft. Bowel sounds are normal.   Musculoskeletal: Normal range of motion. She exhibits no tenderness.   Lymphadenopathy:     She has no cervical adenopathy.   Neurological: She is alert and oriented to person, place, and time.   Skin: Skin is warm and dry.   Psychiatric: She has a normal mood and affect. Her behavior is normal. Thought content normal.       Procedure:      Discussion/Summary:    Hearing loss-improved  hyperlipidemia-labs today  depression-change to abilify  htn-stable  vit d def-cont replacement  Lumbar strain-advised cont HEP  Diverticulosis-increase fiber  Insomnia-melatonin prn  Allergic rhinitis-flonase rx      labs noted and dw patient    Current Outpatient Prescriptions:   •  aspirin 81 MG tablet, Take 1 tablet by mouth daily., Disp: , Rfl:   •  estradiol (ESTRACE) 0.5 MG tablet, Take 1 tablet by mouth daily., Disp: , Rfl:   •  fluticasone (FLONASE) 50 MCG/ACT nasal spray, 2 SPRAYS INTO EACH NOSTRIL DAILY FOR 30 DAYS., Disp: 1 bottle, Rfl: 5  •  hydrOXYzine (ATARAX) 25 MG tablet, 1-2 po tid prn, Disp: 45 tablet, Rfl: 0  •  lisinopril (PRINIVIL,ZESTRIL) 5 MG tablet, TAKE 1 TABLET EVERY DAY, Disp: 90 tablet, Rfl: 1  •  triamcinolone (KENALOG) 0.5 % cream, Apply  topically 2 (Two) Times a Day., Disp: 30 g, Rfl: 1  •  vitamin D (ERGOCALCIFEROL) 73264 units capsule capsule, TAKE 1 CAPSULE BY MOUTH EVERY WEEK, Disp: 12 capsule, Rfl: 3  •  atorvastatin (LIPITOR) 10 MG tablet, TAKE 1 TABLET EVERY NIGHT AT BEDTIME, Disp: 30 tablet, Rfl: 5  •  donepezil (ARICEPT) 5 MG tablet, , Disp: , Rfl:   •  ondansetron (ZOFRAN) 4 MG tablet,  Take 1 tablet by mouth Every 8 (Eight) Hours As Needed for Nausea or Vomiting., Disp: 20 tablet, Rfl: 0        Rebecca was seen today for hypertension and hyperlipidemia.    Diagnoses and all orders for this visit:    Other hyperlipidemia    Essential hypertension    Diverticulosis of large intestine without hemorrhage    Vitamin D deficiency    Anxiety    Other depression

## 2018-04-25 ENCOUNTER — TRANSCRIBE ORDERS (OUTPATIENT)
Dept: ADMINISTRATIVE | Facility: HOSPITAL | Age: 68
End: 2018-04-25

## 2018-04-25 DIAGNOSIS — Z12.31 VISIT FOR SCREENING MAMMOGRAM: Primary | ICD-10-CM

## 2018-04-26 ENCOUNTER — TELEPHONE (OUTPATIENT)
Dept: INTERNAL MEDICINE | Facility: CLINIC | Age: 68
End: 2018-04-26

## 2018-05-17 ENCOUNTER — TELEPHONE (OUTPATIENT)
Dept: INTERNAL MEDICINE | Facility: CLINIC | Age: 68
End: 2018-05-17

## 2018-05-17 RX ORDER — ESTRADIOL 0.5 MG/1
0.5 TABLET ORAL DAILY
Qty: 14 TABLET | Refills: 0 | Status: SHIPPED | OUTPATIENT
Start: 2018-05-17 | End: 2021-01-01

## 2018-05-17 NOTE — TELEPHONE ENCOUNTER
Gave  info for dr land.        ----- Message from Delroy Boswell MD sent at 5/16/2018 11:06 AM EDT -----  Ok for 1 week and I can refer to Dr Arredondo is she wants  ----- Message -----  From: Jerilyn Ferraro MA  Sent: 5/16/2018  10:47 AM  To: Delroy Boswell MD    She is having hot flashes really bad some days. She is going to [facility] ([facility] for [facility]). She refused to give her estridiol which is helping her mood and other issues. They are wanting you give her a week supply of estridol until she has mammo but not sure if [facility] will prescribe due to cancer possibilty.

## 2018-05-31 ENCOUNTER — HOSPITAL ENCOUNTER (OUTPATIENT)
Dept: MAMMOGRAPHY | Facility: HOSPITAL | Age: 68
Discharge: HOME | End: 2018-05-31
Admitting: PHYSICIAN ASSISTANT

## 2018-05-31 ENCOUNTER — APPOINTMENT (OUTPATIENT)
Dept: OTHER | Facility: HOSPITAL | Age: 68
End: 2018-05-31

## 2018-05-31 DIAGNOSIS — Z12.31 VISIT FOR SCREENING MAMMOGRAM: ICD-10-CM

## 2018-05-31 DIAGNOSIS — Z92.89 H/O MAMMOGRAM: ICD-10-CM

## 2018-05-31 PROCEDURE — 77063 BREAST TOMOSYNTHESIS BI: CPT | Performed by: RADIOLOGY

## 2018-05-31 PROCEDURE — 77067 SCR MAMMO BI INCL CAD: CPT

## 2018-05-31 PROCEDURE — 77067 SCR MAMMO BI INCL CAD: CPT | Performed by: RADIOLOGY

## 2018-05-31 PROCEDURE — 77063 BREAST TOMOSYNTHESIS BI: CPT

## 2018-06-18 RX ORDER — ESTRADIOL 0.5 MG/1
TABLET ORAL
Qty: 14 TABLET | OUTPATIENT
Start: 2018-06-18

## 2018-07-10 ENCOUNTER — HOSPITAL ENCOUNTER (OUTPATIENT)
Dept: ULTRASOUND IMAGING | Facility: HOSPITAL | Age: 68
Discharge: HOME | End: 2018-07-10

## 2018-07-10 ENCOUNTER — HOSPITAL ENCOUNTER (OUTPATIENT)
Dept: MAMMOGRAPHY | Facility: HOSPITAL | Age: 68
Discharge: HOME | End: 2018-07-10
Admitting: RADIOLOGY

## 2018-07-10 DIAGNOSIS — R92.8 ABNORMAL MAMMOGRAM: ICD-10-CM

## 2018-07-10 PROCEDURE — 76642 ULTRASOUND BREAST LIMITED: CPT | Performed by: RADIOLOGY

## 2018-07-10 PROCEDURE — 77065 DX MAMMO INCL CAD UNI: CPT

## 2018-07-10 PROCEDURE — 77065 DX MAMMO INCL CAD UNI: CPT | Performed by: RADIOLOGY

## 2018-07-10 PROCEDURE — G0279 TOMOSYNTHESIS, MAMMO: HCPCS | Performed by: RADIOLOGY

## 2018-07-10 PROCEDURE — G0279 TOMOSYNTHESIS, MAMMO: HCPCS

## 2018-07-10 PROCEDURE — 76642 ULTRASOUND BREAST LIMITED: CPT

## 2018-07-17 RX ORDER — ESTRADIOL 0.5 MG/1
TABLET ORAL
Qty: 14 TABLET | OUTPATIENT
Start: 2018-07-17

## 2018-07-19 ENCOUNTER — OFFICE VISIT (OUTPATIENT)
Dept: INTERNAL MEDICINE | Facility: CLINIC | Age: 68
End: 2018-07-19

## 2018-07-19 VITALS
BODY MASS INDEX: 22.02 KG/M2 | WEIGHT: 129 LBS | SYSTOLIC BLOOD PRESSURE: 130 MMHG | DIASTOLIC BLOOD PRESSURE: 82 MMHG | HEIGHT: 64 IN | HEART RATE: 64 BPM

## 2018-07-19 DIAGNOSIS — E78.49 OTHER HYPERLIPIDEMIA: Primary | ICD-10-CM

## 2018-07-19 DIAGNOSIS — J30.1 SEASONAL ALLERGIC RHINITIS DUE TO POLLEN, UNSPECIFIED CHRONICITY: ICD-10-CM

## 2018-07-19 DIAGNOSIS — E55.9 VITAMIN D DEFICIENCY: ICD-10-CM

## 2018-07-19 DIAGNOSIS — K57.30 DIVERTICULOSIS OF LARGE INTESTINE WITHOUT HEMORRHAGE: ICD-10-CM

## 2018-07-19 DIAGNOSIS — K80.20 ASYMPTOMATIC GALLSTONES: ICD-10-CM

## 2018-07-19 DIAGNOSIS — I10 ESSENTIAL HYPERTENSION: ICD-10-CM

## 2018-07-19 DIAGNOSIS — F32.89 OTHER DEPRESSION: ICD-10-CM

## 2018-07-19 DIAGNOSIS — F41.9 ANXIETY: ICD-10-CM

## 2018-07-19 PROCEDURE — 99214 OFFICE O/P EST MOD 30 MIN: CPT | Performed by: INTERNAL MEDICINE

## 2018-07-19 RX ORDER — VENLAFAXINE HYDROCHLORIDE 37.5 MG/1
37.5 CAPSULE, EXTENDED RELEASE ORAL EVERY MORNING
Qty: 30 CAPSULE | Refills: 5 | Status: SHIPPED | OUTPATIENT
Start: 2018-07-19 | End: 2018-10-19

## 2018-07-19 RX ORDER — ESTRADIOL 0.5 MG/1
TABLET ORAL
Qty: 14 TABLET | OUTPATIENT
Start: 2018-07-19

## 2018-07-19 NOTE — PROGRESS NOTES
Patient is a 67 y.o. female who is here for a follow up of chronic conditions.  Chief Complaint   Patient presents with   • Hypertension   • Hyperlipidemia         HPI:    Here for f/u.  Continues on 1/2 of aricept bc the whole pill caused diarrhea.  Feels her memory is better.  Hot flashes are bothersome.   Will be seeing GYN soon.  No abdominal pains.  Sleep is not the best.  No edema.   History:    Patient Active Problem List   Diagnosis   • Anxiety   • Depression   • Diverticulosis of intestine   • Hyperlipidemia   • Hypertension   • Vitamin D deficiency   • Strain of lumbar paraspinal muscle   • Right arm pain   • Seasonal allergic rhinitis due to pollen   • Bilateral hearing loss   • Asymptomatic gallstones       No past medical history on file.    Past Surgical History:   Procedure Laterality Date   • HYSTERECTOMY         Current Outpatient Prescriptions on File Prior to Visit   Medication Sig   • aspirin 81 MG tablet Take 1 tablet by mouth daily.   • atorvastatin (LIPITOR) 10 MG tablet TAKE 1 TABLET EVERY NIGHT AT BEDTIME   • donepezil (ARICEPT) 5 MG tablet    • estradiol (ESTRACE) 0.5 MG tablet Take 1 tablet by mouth Daily.   • fluticasone (FLONASE) 50 MCG/ACT nasal spray 2 SPRAYS INTO EACH NOSTRIL DAILY FOR 30 DAYS.   • hydrOXYzine (ATARAX) 25 MG tablet 1-2 po tid prn   • lisinopril (PRINIVIL,ZESTRIL) 5 MG tablet TAKE 1 TABLET EVERY DAY   • ondansetron (ZOFRAN) 4 MG tablet Take 1 tablet by mouth Every 8 (Eight) Hours As Needed for Nausea or Vomiting.   • triamcinolone (KENALOG) 0.5 % cream Apply  topically 2 (Two) Times a Day.   • vitamin D (ERGOCALCIFEROL) 66375 units capsule capsule TAKE 1 CAPSULE BY MOUTH EVERY WEEK     No current facility-administered medications on file prior to visit.        Family History   Problem Relation Age of Onset   • Heart disease Father    • Breast cancer Neg Hx    • Ovarian cancer Neg Hx        Social History     Social History   • Marital status:      Spouse name:  "N/A   • Number of children: N/A   • Years of education: N/A     Occupational History   • Not on file.     Social History Main Topics   • Smoking status: Never Smoker   • Smokeless tobacco: Not on file   • Alcohol use Not on file   • Drug use: Unknown   • Sexual activity: Not on file     Other Topics Concern   • Not on file     Social History Narrative   • No narrative on file         Review of Systems   Constitutional: Positive for fatigue. Negative for chills, diaphoresis, fever and unexpected weight change.   HENT: Negative for congestion, ear pain, hearing loss, nosebleeds, postnasal drip, sinus pressure and sore throat.    Eyes: Negative for pain, discharge and itching.   Respiratory: Negative for cough, chest tightness, shortness of breath and wheezing.    Cardiovascular: Negative for chest pain, palpitations and leg swelling.   Gastrointestinal: Negative for abdominal distention, abdominal pain, blood in stool, constipation, diarrhea, nausea and vomiting.        10/15 colonoscopy without polyps   Endocrine: Positive for heat intolerance. Negative for polydipsia and polyuria.   Genitourinary: Negative for difficulty urinating, dysuria, frequency and hematuria.   Musculoskeletal: Positive for arthralgias. Negative for back pain, gait problem, joint swelling and myalgias.   Skin: Negative for rash and wound.   Allergic/Immunologic: Positive for environmental allergies.   Neurological: Negative for dizziness, syncope, weakness, numbness and headaches.        Memory problems   Psychiatric/Behavioral: Positive for decreased concentration, dysphoric mood and sleep disturbance. The patient is not nervous/anxious.        /82   Pulse 64   Ht 162.6 cm (64.02\")   Wt 58.5 kg (129 lb)   LMP  (LMP Unknown)   BMI 22.13 kg/m²       Physical Exam   Constitutional: She is oriented to person, place, and time. She appears well-developed and well-nourished.   HENT:   Head: Normocephalic and atraumatic.   Right Ear: " External ear normal.   Left Ear: External ear normal.   Mouth/Throat: Oropharynx is clear and moist.   Eyes: Conjunctivae and EOM are normal.   Neck: Normal range of motion. Neck supple.   Cardiovascular: Normal rate, regular rhythm and normal heart sounds.    Pulmonary/Chest: Effort normal and breath sounds normal.   Abdominal: Soft. Bowel sounds are normal.   Musculoskeletal: Normal range of motion. She exhibits no tenderness.   Lymphadenopathy:     She has no cervical adenopathy.   Neurological: She is alert and oriented to person, place, and time.   Skin: Skin is warm and dry.   Psychiatric: She has a normal mood and affect. Her behavior is normal. Thought content normal.       Procedure:      Discussion/Summary:    Hearing loss-improved  hyperlipidemia-labs noted  depression-add effexor  htn-stable  vit d def-cont replacement  Lumbar strain-advised cont HEP  Diverticulosis-increase fiber  Insomnia-melatonin prn  Allergic rhinitis-flonase rx   Memory impairment-will attempt to increase the aricept      labs noted and dw patient    Current Outpatient Prescriptions:   •  aspirin 81 MG tablet, Take 1 tablet by mouth daily., Disp: , Rfl:   •  atorvastatin (LIPITOR) 10 MG tablet, TAKE 1 TABLET EVERY NIGHT AT BEDTIME, Disp: 30 tablet, Rfl: 5  •  donepezil (ARICEPT) 5 MG tablet, , Disp: , Rfl:   •  estradiol (ESTRACE) 0.5 MG tablet, Take 1 tablet by mouth Daily., Disp: 14 tablet, Rfl: 0  •  fluticasone (FLONASE) 50 MCG/ACT nasal spray, 2 SPRAYS INTO EACH NOSTRIL DAILY FOR 30 DAYS., Disp: 1 bottle, Rfl: 5  •  hydrOXYzine (ATARAX) 25 MG tablet, 1-2 po tid prn, Disp: 45 tablet, Rfl: 0  •  lisinopril (PRINIVIL,ZESTRIL) 5 MG tablet, TAKE 1 TABLET EVERY DAY, Disp: 90 tablet, Rfl: 1  •  ondansetron (ZOFRAN) 4 MG tablet, Take 1 tablet by mouth Every 8 (Eight) Hours As Needed for Nausea or Vomiting., Disp: 20 tablet, Rfl: 0  •  triamcinolone (KENALOG) 0.5 % cream, Apply  topically 2 (Two) Times a Day., Disp: 30 g, Rfl: 1  •   vitamin D (ERGOCALCIFEROL) 56034 units capsule capsule, TAKE 1 CAPSULE BY MOUTH EVERY WEEK, Disp: 12 capsule, Rfl: 3  •  venlafaxine XR (EFFEXOR-XR) 37.5 MG 24 hr capsule, Take 1 capsule by mouth Every Morning., Disp: 30 capsule, Rfl: 5        Rebecca was seen today for hypertension and hyperlipidemia.    Diagnoses and all orders for this visit:    Other hyperlipidemia    Essential hypertension    Seasonal allergic rhinitis due to pollen, unspecified chronicity    Asymptomatic gallstones    Diverticulosis of large intestine without hemorrhage    Vitamin D deficiency    Anxiety  -     venlafaxine XR (EFFEXOR-XR) 37.5 MG 24 hr capsule; Take 1 capsule by mouth Every Morning.    Other depression  -     venlafaxine XR (EFFEXOR-XR) 37.5 MG 24 hr capsule; Take 1 capsule by mouth Every Morning.

## 2018-07-26 DIAGNOSIS — F32.89 OTHER DEPRESSION: ICD-10-CM

## 2018-07-26 RX ORDER — FLUOXETINE HYDROCHLORIDE 40 MG/1
40 CAPSULE ORAL DAILY
Qty: 90 CAPSULE | Refills: 1 | Status: SHIPPED | OUTPATIENT
Start: 2018-07-26 | End: 2019-02-19

## 2018-09-12 RX ORDER — LISINOPRIL 5 MG/1
TABLET ORAL
Qty: 90 TABLET | Refills: 1 | Status: SHIPPED | OUTPATIENT
Start: 2018-09-12 | End: 2019-05-31 | Stop reason: SDUPTHER

## 2018-10-19 ENCOUNTER — OFFICE VISIT (OUTPATIENT)
Dept: INTERNAL MEDICINE | Facility: CLINIC | Age: 68
End: 2018-10-19

## 2018-10-19 VITALS
HEIGHT: 64 IN | WEIGHT: 130 LBS | DIASTOLIC BLOOD PRESSURE: 70 MMHG | SYSTOLIC BLOOD PRESSURE: 126 MMHG | BODY MASS INDEX: 22.2 KG/M2 | HEART RATE: 68 BPM

## 2018-10-19 DIAGNOSIS — J30.1 SEASONAL ALLERGIC RHINITIS DUE TO POLLEN: ICD-10-CM

## 2018-10-19 DIAGNOSIS — F32.89 OTHER DEPRESSION: ICD-10-CM

## 2018-10-19 DIAGNOSIS — K57.30 DIVERTICULOSIS OF LARGE INTESTINE WITHOUT HEMORRHAGE: ICD-10-CM

## 2018-10-19 DIAGNOSIS — I10 ESSENTIAL HYPERTENSION: ICD-10-CM

## 2018-10-19 DIAGNOSIS — E78.49 OTHER HYPERLIPIDEMIA: Primary | ICD-10-CM

## 2018-10-19 DIAGNOSIS — E55.9 VITAMIN D DEFICIENCY: ICD-10-CM

## 2018-10-19 DIAGNOSIS — F41.9 ANXIETY: ICD-10-CM

## 2018-10-19 LAB
25(OH)D3 SERPL-MCNC: 92.5 NG/ML
ALBUMIN SERPL-MCNC: 4.13 G/DL (ref 3.2–4.8)
ALBUMIN/GLOB SERPL: 1.7 G/DL (ref 1.5–2.5)
ALP SERPL-CCNC: 110 U/L (ref 25–100)
ALT SERPL W P-5'-P-CCNC: 22 U/L (ref 7–40)
ANION GAP SERPL CALCULATED.3IONS-SCNC: 4 MMOL/L (ref 3–11)
ARTICHOKE IGE QN: 117 MG/DL (ref 0–130)
AST SERPL-CCNC: 28 U/L (ref 0–33)
BILIRUB SERPL-MCNC: 0.5 MG/DL (ref 0.3–1.2)
BUN BLD-MCNC: 13 MG/DL (ref 9–23)
BUN/CREAT SERPL: 15.7 (ref 7–25)
CALCIUM SPEC-SCNC: 9.2 MG/DL (ref 8.7–10.4)
CHLORIDE SERPL-SCNC: 104 MMOL/L (ref 99–109)
CHOLEST SERPL-MCNC: 189 MG/DL (ref 0–200)
CO2 SERPL-SCNC: 32 MMOL/L (ref 20–31)
CREAT BLD-MCNC: 0.83 MG/DL (ref 0.6–1.3)
DEPRECATED RDW RBC AUTO: 43.9 FL (ref 37–54)
ERYTHROCYTE [DISTWIDTH] IN BLOOD BY AUTOMATED COUNT: 13 % (ref 11.3–14.5)
GFR SERPL CREATININE-BSD FRML MDRD: 68 ML/MIN/1.73
GLOBULIN UR ELPH-MCNC: 2.4 GM/DL
GLUCOSE BLD-MCNC: 84 MG/DL (ref 70–100)
HCT VFR BLD AUTO: 39.4 % (ref 34.5–44)
HDLC SERPL-MCNC: 63 MG/DL (ref 40–60)
HGB BLD-MCNC: 12.6 G/DL (ref 11.5–15.5)
MCH RBC QN AUTO: 29.7 PG (ref 27–31)
MCHC RBC AUTO-ENTMCNC: 32 G/DL (ref 32–36)
MCV RBC AUTO: 92.9 FL (ref 80–99)
PLATELET # BLD AUTO: 303 10*3/MM3 (ref 150–450)
PMV BLD AUTO: 10.5 FL (ref 6–12)
POTASSIUM BLD-SCNC: 4.3 MMOL/L (ref 3.5–5.5)
PROT SERPL-MCNC: 6.5 G/DL (ref 5.7–8.2)
RBC # BLD AUTO: 4.24 10*6/MM3 (ref 3.89–5.14)
SODIUM BLD-SCNC: 140 MMOL/L (ref 132–146)
TRIGL SERPL-MCNC: 121 MG/DL (ref 0–150)
TSH SERPL DL<=0.05 MIU/L-ACNC: 1.32 MIU/ML (ref 0.35–5.35)
VIT B12 BLD-MCNC: 844 PG/ML (ref 211–911)
WBC NRBC COR # BLD: 6.68 10*3/MM3 (ref 3.5–10.8)

## 2018-10-19 PROCEDURE — 82306 VITAMIN D 25 HYDROXY: CPT | Performed by: INTERNAL MEDICINE

## 2018-10-19 PROCEDURE — 99214 OFFICE O/P EST MOD 30 MIN: CPT | Performed by: INTERNAL MEDICINE

## 2018-10-19 PROCEDURE — 80061 LIPID PANEL: CPT | Performed by: INTERNAL MEDICINE

## 2018-10-19 PROCEDURE — 85027 COMPLETE CBC AUTOMATED: CPT | Performed by: INTERNAL MEDICINE

## 2018-10-19 PROCEDURE — 80053 COMPREHEN METABOLIC PANEL: CPT | Performed by: INTERNAL MEDICINE

## 2018-10-19 PROCEDURE — 84443 ASSAY THYROID STIM HORMONE: CPT | Performed by: INTERNAL MEDICINE

## 2018-10-19 PROCEDURE — 82607 VITAMIN B-12: CPT | Performed by: INTERNAL MEDICINE

## 2018-10-19 NOTE — PROGRESS NOTES
Patient is a 67 y.o. female who is here for a follow up of chronic conditions.  Chief Complaint   Patient presents with   • Hypertension   • Hyperlipidemia         HPI:    Here for f/u.  Recently placed on ERT and feels better.  Memory is some better.  Could not tolerate the aricept bc of diarrhea.  Sleeping ok with the use of advil pm.  Started back on prozac.     History:    Patient Active Problem List   Diagnosis   • Anxiety   • Depression   • Diverticulosis of intestine   • Hyperlipidemia   • Hypertension   • Vitamin D deficiency   • Strain of lumbar paraspinal muscle   • Right arm pain   • Seasonal allergic rhinitis due to pollen   • Bilateral hearing loss   • Asymptomatic gallstones       No past medical history on file.    Past Surgical History:   Procedure Laterality Date   • HYSTERECTOMY         Current Outpatient Prescriptions on File Prior to Visit   Medication Sig   • aspirin 81 MG tablet Take 1 tablet by mouth daily.   • atorvastatin (LIPITOR) 10 MG tablet TAKE 1 TABLET EVERY NIGHT AT BEDTIME   • estradiol (ESTRACE) 0.5 MG tablet Take 1 tablet by mouth Daily.   • FLUoxetine (PROzac) 40 MG capsule TAKE 1 CAPSULE BY MOUTH DAILY.   • fluticasone (FLONASE) 50 MCG/ACT nasal spray 2 SPRAYS INTO EACH NOSTRIL DAILY FOR 30 DAYS.   • hydrOXYzine (ATARAX) 25 MG tablet 1-2 po tid prn   • lisinopril (PRINIVIL,ZESTRIL) 5 MG tablet TAKE 1 TABLET EVERY DAY   • ondansetron (ZOFRAN) 4 MG tablet Take 1 tablet by mouth Every 8 (Eight) Hours As Needed for Nausea or Vomiting.   • triamcinolone (KENALOG) 0.5 % cream Apply  topically 2 (Two) Times a Day.   • vitamin D (ERGOCALCIFEROL) 01357 units capsule capsule TAKE 1 CAPSULE BY MOUTH EVERY WEEK   • [DISCONTINUED] donepezil (ARICEPT) 5 MG tablet    • [DISCONTINUED] venlafaxine XR (EFFEXOR-XR) 37.5 MG 24 hr capsule Take 1 capsule by mouth Every Morning.     No current facility-administered medications on file prior to visit.        Family History   Problem Relation Age of  "Onset   • Heart disease Father    • Breast cancer Neg Hx    • Ovarian cancer Neg Hx        Social History     Social History   • Marital status:      Spouse name: N/A   • Number of children: N/A   • Years of education: N/A     Occupational History   • Not on file.     Social History Main Topics   • Smoking status: Never Smoker   • Smokeless tobacco: Not on file   • Alcohol use Not on file   • Drug use: Unknown   • Sexual activity: Not on file     Other Topics Concern   • Not on file     Social History Narrative   • No narrative on file         Review of Systems   Constitutional: Positive for fatigue. Negative for chills, diaphoresis, fever and unexpected weight change.   HENT: Negative for congestion, ear pain, hearing loss, nosebleeds, postnasal drip, sinus pressure and sore throat.    Eyes: Negative for pain, discharge and itching.   Respiratory: Negative for cough, chest tightness, shortness of breath and wheezing.    Cardiovascular: Negative for chest pain, palpitations and leg swelling.   Gastrointestinal: Negative for abdominal distention, abdominal pain, blood in stool, constipation, diarrhea, nausea and vomiting.        10/15 colonoscopy without polyps   Endocrine: Negative for polydipsia and polyuria.   Genitourinary: Negative for difficulty urinating, dysuria, frequency and hematuria.   Musculoskeletal: Positive for arthralgias. Negative for back pain, gait problem, joint swelling and myalgias.   Skin: Negative for rash and wound.   Allergic/Immunologic: Positive for environmental allergies.   Neurological: Negative for dizziness, syncope, weakness, numbness and headaches.        Memory problems   Psychiatric/Behavioral: Positive for decreased concentration, dysphoric mood and sleep disturbance. The patient is not nervous/anxious.        /70   Pulse 68   Ht 162.6 cm (64.02\")   Wt 59 kg (130 lb)   LMP  (LMP Unknown)   BMI 22.30 kg/m²       Physical Exam   Constitutional: She is oriented to " person, place, and time. She appears well-developed and well-nourished.   HENT:   Head: Normocephalic and atraumatic.   Right Ear: External ear normal.   Left Ear: External ear normal.   Mouth/Throat: Oropharynx is clear and moist.   Eyes: Conjunctivae and EOM are normal.   Neck: Normal range of motion. Neck supple.   Cardiovascular: Normal rate, regular rhythm and normal heart sounds.    Pulmonary/Chest: Effort normal and breath sounds normal.   Abdominal: Soft. Bowel sounds are normal.   Musculoskeletal: Normal range of motion. She exhibits no tenderness.   Lymphadenopathy:     She has no cervical adenopathy.   Neurological: She is alert and oriented to person, place, and time.   Skin: Skin is warm and dry.   Psychiatric: She has a normal mood and affect. Her behavior is normal. Thought content normal.       Procedure:      Discussion/Summary:  Hearing loss-improved  hyperlipidemia-labs today  Depression-cont prozac  htn-stable  vit d def-recheck  Lumbar strain-advised cont HEP  Diverticulosis-increase fiber  Insomnia-melatonin prn  Allergic rhinitis-flonase prn  Memory impairment-refusing tx      labs noted and dw patient      Current Outpatient Prescriptions:   •  aspirin 81 MG tablet, Take 1 tablet by mouth daily., Disp: , Rfl:   •  atorvastatin (LIPITOR) 10 MG tablet, TAKE 1 TABLET EVERY NIGHT AT BEDTIME, Disp: 30 tablet, Rfl: 5  •  estradiol (ESTRACE) 0.5 MG tablet, Take 1 tablet by mouth Daily., Disp: 14 tablet, Rfl: 0  •  FLUoxetine (PROzac) 40 MG capsule, TAKE 1 CAPSULE BY MOUTH DAILY., Disp: 90 capsule, Rfl: 1  •  fluticasone (FLONASE) 50 MCG/ACT nasal spray, 2 SPRAYS INTO EACH NOSTRIL DAILY FOR 30 DAYS., Disp: 1 bottle, Rfl: 5  •  hydrOXYzine (ATARAX) 25 MG tablet, 1-2 po tid prn, Disp: 45 tablet, Rfl: 0  •  lisinopril (PRINIVIL,ZESTRIL) 5 MG tablet, TAKE 1 TABLET EVERY DAY, Disp: 90 tablet, Rfl: 1  •  ondansetron (ZOFRAN) 4 MG tablet, Take 1 tablet by mouth Every 8 (Eight) Hours As Needed for Nausea or  Vomiting., Disp: 20 tablet, Rfl: 0  •  triamcinolone (KENALOG) 0.5 % cream, Apply  topically 2 (Two) Times a Day., Disp: 30 g, Rfl: 1  •  vitamin D (ERGOCALCIFEROL) 65121 units capsule capsule, TAKE 1 CAPSULE BY MOUTH EVERY WEEK, Disp: 12 capsule, Rfl: 3        Rebecca was seen today for hypertension and hyperlipidemia.    Diagnoses and all orders for this visit:    Other hyperlipidemia  -     Comprehensive Metabolic Panel  -     TSH  -     Lipid Panel    Essential hypertension  -     CBC (No Diff)    Seasonal allergic rhinitis due to pollen    Diverticulosis of large intestine without hemorrhage    Vitamin D deficiency  -     Vitamin D 25 Hydroxy    Anxiety  -     Vitamin B12    Other depression  -     Vitamin B12

## 2018-11-26 RX ORDER — ATORVASTATIN CALCIUM 10 MG/1
TABLET, FILM COATED ORAL
Qty: 30 TABLET | Refills: 5 | Status: SHIPPED | OUTPATIENT
Start: 2018-11-26 | End: 2019-10-01

## 2019-01-31 DIAGNOSIS — R41.3 MEMORY IMPAIRMENT: ICD-10-CM

## 2019-01-31 RX ORDER — DONEPEZIL HYDROCHLORIDE 5 MG/1
5 TABLET, FILM COATED ORAL NIGHTLY
Qty: 30 TABLET | Refills: 5 | Status: SHIPPED | OUTPATIENT
Start: 2019-01-31 | End: 2019-05-31

## 2019-02-19 ENCOUNTER — OFFICE VISIT (OUTPATIENT)
Dept: INTERNAL MEDICINE | Facility: CLINIC | Age: 69
End: 2019-02-19

## 2019-02-19 VITALS
WEIGHT: 130 LBS | HEIGHT: 64 IN | BODY MASS INDEX: 22.2 KG/M2 | HEART RATE: 64 BPM | SYSTOLIC BLOOD PRESSURE: 130 MMHG | DIASTOLIC BLOOD PRESSURE: 78 MMHG

## 2019-02-19 DIAGNOSIS — F41.9 ANXIETY: ICD-10-CM

## 2019-02-19 DIAGNOSIS — Z01.419 ENCNTR FOR GYN EXAM (GENERAL) (ROUTINE) W/O ABN FINDINGS: ICD-10-CM

## 2019-02-19 DIAGNOSIS — H91.93 BILATERAL HEARING LOSS, UNSPECIFIED HEARING LOSS TYPE: ICD-10-CM

## 2019-02-19 DIAGNOSIS — E78.49 OTHER HYPERLIPIDEMIA: ICD-10-CM

## 2019-02-19 DIAGNOSIS — G31.84 MCI (MILD COGNITIVE IMPAIRMENT): ICD-10-CM

## 2019-02-19 DIAGNOSIS — I10 ESSENTIAL HYPERTENSION: Primary | ICD-10-CM

## 2019-02-19 DIAGNOSIS — K57.30 DIVERTICULOSIS OF LARGE INTESTINE WITHOUT HEMORRHAGE: ICD-10-CM

## 2019-02-19 DIAGNOSIS — J30.1 SEASONAL ALLERGIC RHINITIS DUE TO POLLEN: ICD-10-CM

## 2019-02-19 DIAGNOSIS — F32.89 OTHER DEPRESSION: ICD-10-CM

## 2019-02-19 DIAGNOSIS — E55.9 VITAMIN D DEFICIENCY: ICD-10-CM

## 2019-02-19 PROCEDURE — 99214 OFFICE O/P EST MOD 30 MIN: CPT | Performed by: INTERNAL MEDICINE

## 2019-02-19 NOTE — PROGRESS NOTES
Patient is a 68 y.o. female who is here for a follow up of chronic conditions.  Chief Complaint   Patient presents with   • Hyperlipidemia   • Hypertension         HPI:    Here for f/u.  Still has not been seen by GYN.  Doing ok.  Memory is still an issue.  Some days are better than others.  Currently on aricept 5 mg for the past 3-4 weeks.  Some diarrhea.  No depression.     History:    Patient Active Problem List   Diagnosis   • Anxiety   • Depression   • Diverticulosis of intestine   • Hyperlipidemia   • Hypertension   • Vitamin D deficiency   • Strain of lumbar paraspinal muscle   • Right arm pain   • Seasonal allergic rhinitis due to pollen   • Bilateral hearing loss   • Asymptomatic gallstones   • MCI (mild cognitive impairment)       No past medical history on file.    Past Surgical History:   Procedure Laterality Date   • HYSTERECTOMY         Current Outpatient Medications on File Prior to Visit   Medication Sig   • aspirin 81 MG tablet Take 1 tablet by mouth daily.   • atorvastatin (LIPITOR) 10 MG tablet TAKE 1 TABLET EVERY NIGHT AT BEDTIME   • donepezil (ARICEPT) 5 MG tablet TAKE 1 TABLET BY MOUTH EVERY NIGHT.   • estradiol (ESTRACE) 0.5 MG tablet Take 1 tablet by mouth Daily.   • fluticasone (FLONASE) 50 MCG/ACT nasal spray 2 SPRAYS INTO EACH NOSTRIL DAILY FOR 30 DAYS.   • hydrOXYzine (ATARAX) 25 MG tablet 1-2 po tid prn   • lisinopril (PRINIVIL,ZESTRIL) 5 MG tablet TAKE 1 TABLET EVERY DAY   • ondansetron (ZOFRAN) 4 MG tablet Take 1 tablet by mouth Every 8 (Eight) Hours As Needed for Nausea or Vomiting.   • triamcinolone (KENALOG) 0.5 % cream Apply  topically 2 (Two) Times a Day.   • vitamin D (ERGOCALCIFEROL) 14265 units capsule capsule TAKE 1 CAPSULE BY MOUTH EVERY WEEK   • [DISCONTINUED] FLUoxetine (PROzac) 40 MG capsule TAKE 1 CAPSULE BY MOUTH DAILY.     No current facility-administered medications on file prior to visit.        Family History   Problem Relation Age of Onset   • Heart disease Father     • Breast cancer Neg Hx    • Ovarian cancer Neg Hx        Social History     Socioeconomic History   • Marital status:      Spouse name: Not on file   • Number of children: Not on file   • Years of education: Not on file   • Highest education level: Not on file   Social Needs   • Financial resource strain: Not on file   • Food insecurity - worry: Not on file   • Food insecurity - inability: Not on file   • Transportation needs - medical: Not on file   • Transportation needs - non-medical: Not on file   Occupational History   • Not on file   Tobacco Use   • Smoking status: Never Smoker   Substance and Sexual Activity   • Alcohol use: Not on file   • Drug use: Not on file   • Sexual activity: Not on file   Other Topics Concern   • Not on file   Social History Narrative   • Not on file         Review of Systems   Constitutional: Positive for fatigue. Negative for chills, diaphoresis, fever and unexpected weight change.   HENT: Negative for congestion, ear pain, hearing loss, nosebleeds, postnasal drip, sinus pressure and sore throat.    Eyes: Negative for pain, discharge and itching.   Respiratory: Negative for cough, chest tightness, shortness of breath and wheezing.    Cardiovascular: Negative for chest pain, palpitations and leg swelling.   Gastrointestinal: Negative for abdominal distention, abdominal pain, blood in stool, constipation, diarrhea, nausea and vomiting.        10/15 colonoscopy without polyps   Endocrine: Negative for polydipsia and polyuria.   Genitourinary: Negative for difficulty urinating, dysuria, frequency and hematuria.   Musculoskeletal: Positive for arthralgias. Negative for back pain, gait problem, joint swelling and myalgias.   Skin: Negative for rash and wound.   Allergic/Immunologic: Positive for environmental allergies.   Neurological: Negative for dizziness, syncope, weakness, numbness and headaches.        Memory problems   Psychiatric/Behavioral: Positive for decreased  "concentration, dysphoric mood and sleep disturbance. The patient is not nervous/anxious.        /78   Pulse 64   Ht 162.6 cm (64.02\")   Wt 59 kg (130 lb)   LMP  (LMP Unknown)   BMI 22.30 kg/m²       Physical Exam   Constitutional: She is oriented to person, place, and time. She appears well-developed and well-nourished.   HENT:   Head: Normocephalic and atraumatic.   Right Ear: External ear normal.   Left Ear: External ear normal.   Mouth/Throat: Oropharynx is clear and moist.   Eyes: Conjunctivae and EOM are normal.   Neck: Normal range of motion. Neck supple.   Cardiovascular: Normal rate, regular rhythm and normal heart sounds.   Pulmonary/Chest: Effort normal and breath sounds normal.   Abdominal: Soft. Bowel sounds are normal.   Musculoskeletal: Normal range of motion. She exhibits no tenderness.   Lymphadenopathy:     She has no cervical adenopathy.   Neurological: She is alert and oriented to person, place, and time.   Skin: Skin is warm and dry.   Psychiatric: She has a normal mood and affect. Her behavior is normal. Thought content normal.       Procedure:      Discussion/Summary:    Hearing loss-improved  hyperlipidemia-labs noted  Depression-change to trintellix  htn-stable  vit d def-recheck noted  Lumbar strain-advised cont HEP  Diverticulosis-increase fiber  Insomnia-melatonin prn  Allergic rhinitis-flonase prn  Memory impairment-cont aricept      labs noted and dw patient        Current Outpatient Medications:   •  aspirin 81 MG tablet, Take 1 tablet by mouth daily., Disp: , Rfl:   •  atorvastatin (LIPITOR) 10 MG tablet, TAKE 1 TABLET EVERY NIGHT AT BEDTIME, Disp: 30 tablet, Rfl: 5  •  donepezil (ARICEPT) 5 MG tablet, TAKE 1 TABLET BY MOUTH EVERY NIGHT., Disp: 30 tablet, Rfl: 5  •  estradiol (ESTRACE) 0.5 MG tablet, Take 1 tablet by mouth Daily., Disp: 14 tablet, Rfl: 0  •  fluticasone (FLONASE) 50 MCG/ACT nasal spray, 2 SPRAYS INTO EACH NOSTRIL DAILY FOR 30 DAYS., Disp: 1 bottle, Rfl: 5  • "  hydrOXYzine (ATARAX) 25 MG tablet, 1-2 po tid prn, Disp: 45 tablet, Rfl: 0  •  lisinopril (PRINIVIL,ZESTRIL) 5 MG tablet, TAKE 1 TABLET EVERY DAY, Disp: 90 tablet, Rfl: 1  •  ondansetron (ZOFRAN) 4 MG tablet, Take 1 tablet by mouth Every 8 (Eight) Hours As Needed for Nausea or Vomiting., Disp: 20 tablet, Rfl: 0  •  triamcinolone (KENALOG) 0.5 % cream, Apply  topically 2 (Two) Times a Day., Disp: 30 g, Rfl: 1  •  vitamin D (ERGOCALCIFEROL) 30572 units capsule capsule, TAKE 1 CAPSULE BY MOUTH EVERY WEEK, Disp: 12 capsule, Rfl: 3  •  Vortioxetine HBr (TRINTELLIX) 5 MG tablet, Take 5 mg by mouth Daily With Breakfast., Disp: 30 tablet, Rfl: 5        Rebecca was seen today for hyperlipidemia and hypertension.    Diagnoses and all orders for this visit:    Essential hypertension    Other hyperlipidemia    Seasonal allergic rhinitis due to pollen    Vitamin D deficiency    Diverticulosis of large intestine without hemorrhage    Bilateral hearing loss, unspecified hearing loss type    Other depression  -     Vortioxetine HBr (TRINTELLIX) 5 MG tablet; Take 5 mg by mouth Daily With Breakfast.    Anxiety    MCI (mild cognitive impairment)    Encntr for gyn exam (general) (routine) w/o abn findings  -     Ambulatory Referral to Gynecology

## 2019-05-31 ENCOUNTER — OFFICE VISIT (OUTPATIENT)
Dept: INTERNAL MEDICINE | Facility: CLINIC | Age: 69
End: 2019-05-31

## 2019-05-31 VITALS
BODY MASS INDEX: 21.68 KG/M2 | DIASTOLIC BLOOD PRESSURE: 80 MMHG | SYSTOLIC BLOOD PRESSURE: 120 MMHG | HEIGHT: 64 IN | HEART RATE: 64 BPM | WEIGHT: 127 LBS

## 2019-05-31 DIAGNOSIS — K57.30 DIVERTICULOSIS OF LARGE INTESTINE WITHOUT HEMORRHAGE: ICD-10-CM

## 2019-05-31 DIAGNOSIS — I10 ESSENTIAL HYPERTENSION: Primary | ICD-10-CM

## 2019-05-31 DIAGNOSIS — G31.84 MCI (MILD COGNITIVE IMPAIRMENT): ICD-10-CM

## 2019-05-31 DIAGNOSIS — F41.9 ANXIETY: ICD-10-CM

## 2019-05-31 DIAGNOSIS — F32.89 OTHER DEPRESSION: ICD-10-CM

## 2019-05-31 DIAGNOSIS — E55.9 VITAMIN D DEFICIENCY: ICD-10-CM

## 2019-05-31 DIAGNOSIS — J30.1 SEASONAL ALLERGIC RHINITIS DUE TO POLLEN: ICD-10-CM

## 2019-05-31 DIAGNOSIS — E78.49 OTHER HYPERLIPIDEMIA: ICD-10-CM

## 2019-05-31 PROCEDURE — 99214 OFFICE O/P EST MOD 30 MIN: CPT | Performed by: INTERNAL MEDICINE

## 2019-05-31 RX ORDER — LISINOPRIL 5 MG/1
5 TABLET ORAL NIGHTLY
Qty: 90 TABLET | Refills: 3 | Status: SHIPPED | OUTPATIENT
Start: 2019-05-31 | End: 2021-01-01

## 2019-05-31 RX ORDER — MEMANTINE HYDROCHLORIDE 5 MG/1
5 TABLET ORAL 2 TIMES DAILY
Qty: 60 TABLET | Refills: 5 | Status: SHIPPED | OUTPATIENT
Start: 2019-05-31 | End: 2019-10-01

## 2019-05-31 NOTE — PROGRESS NOTES
Patient is a 68 y.o. female who is here for a follow up of chronic conditions.  Chief Complaint   Patient presents with   • Hyperlipidemia   • Hypertension         HPI:    Here for f/u.  Still having issues with memory.  Could not tolerate Aricept sec to GI issues.  Sleeping some better.  No dizziness or lightheadedness.  No falls.  No abdominal pains.  Appetite is good.     History:    Patient Active Problem List   Diagnosis   • Anxiety   • Depression   • Diverticulosis of intestine   • Hyperlipidemia   • Hypertension   • Vitamin D deficiency   • Strain of lumbar paraspinal muscle   • Right arm pain   • Seasonal allergic rhinitis due to pollen   • Bilateral hearing loss   • Asymptomatic gallstones   • MCI (mild cognitive impairment)       No past medical history on file.    Past Surgical History:   Procedure Laterality Date   • HYSTERECTOMY         Current Outpatient Medications on File Prior to Visit   Medication Sig   • aspirin 81 MG tablet Take 1 tablet by mouth daily.   • atorvastatin (LIPITOR) 10 MG tablet TAKE 1 TABLET EVERY NIGHT AT BEDTIME   • estradiol (ESTRACE) 0.5 MG tablet Take 1 tablet by mouth Daily.   • fluticasone (FLONASE) 50 MCG/ACT nasal spray 2 SPRAYS INTO EACH NOSTRIL DAILY FOR 30 DAYS.   • hydrOXYzine (ATARAX) 25 MG tablet 1-2 po tid prn   • ondansetron (ZOFRAN) 4 MG tablet Take 1 tablet by mouth Every 8 (Eight) Hours As Needed for Nausea or Vomiting.   • triamcinolone (KENALOG) 0.5 % cream Apply  topically 2 (Two) Times a Day.   • vitamin D (ERGOCALCIFEROL) 59051 units capsule capsule TAKE 1 CAPSULE BY MOUTH EVERY WEEK   • [DISCONTINUED] lisinopril (PRINIVIL,ZESTRIL) 5 MG tablet TAKE 1 TABLET EVERY DAY   • [DISCONTINUED] donepezil (ARICEPT) 5 MG tablet TAKE 1 TABLET BY MOUTH EVERY NIGHT.   • [DISCONTINUED] Vortioxetine HBr (TRINTELLIX) 5 MG tablet Take 5 mg by mouth Daily With Breakfast.     No current facility-administered medications on file prior to visit.        Family History   Problem  "Relation Age of Onset   • Heart disease Father    • Breast cancer Neg Hx    • Ovarian cancer Neg Hx        Social History     Socioeconomic History   • Marital status:      Spouse name: Not on file   • Number of children: Not on file   • Years of education: Not on file   • Highest education level: Not on file   Tobacco Use   • Smoking status: Never Smoker         Review of Systems   Constitutional: Positive for fatigue. Negative for chills, diaphoresis, fever and unexpected weight change.   HENT: Negative for congestion, ear pain, hearing loss, nosebleeds, postnasal drip, sinus pressure and sore throat.    Eyes: Negative for pain, discharge and itching.   Respiratory: Negative for cough, chest tightness, shortness of breath and wheezing.    Cardiovascular: Negative for chest pain, palpitations and leg swelling.   Gastrointestinal: Negative for abdominal distention, abdominal pain, blood in stool, constipation, diarrhea, nausea and vomiting.        10/15 colonoscopy without polyps   Endocrine: Negative for polydipsia and polyuria.   Genitourinary: Negative for difficulty urinating, dysuria, frequency and hematuria.   Musculoskeletal: Positive for arthralgias. Negative for back pain, gait problem, joint swelling and myalgias.   Skin: Negative for rash and wound.   Allergic/Immunologic: Positive for environmental allergies.   Neurological: Negative for dizziness, syncope, weakness, numbness and headaches.        Memory problems   Psychiatric/Behavioral: Positive for decreased concentration and dysphoric mood. The patient is not nervous/anxious.        /80   Pulse 64   Ht 162.6 cm (64.02\")   Wt 57.6 kg (127 lb)   LMP  (LMP Unknown)   BMI 21.79 kg/m²       Physical Exam   Constitutional: She is oriented to person, place, and time. She appears well-developed and well-nourished.   HENT:   Head: Normocephalic and atraumatic.   Right Ear: External ear normal.   Left Ear: External ear normal.   Mouth/Throat: " Oropharynx is clear and moist.   Eyes: Conjunctivae and EOM are normal.   Neck: Normal range of motion. Neck supple.   Cardiovascular: Normal rate, regular rhythm and normal heart sounds.   Pulmonary/Chest: Effort normal and breath sounds normal.   Abdominal: Soft. Bowel sounds are normal.   Musculoskeletal: Normal range of motion. She exhibits no tenderness.   Lymphadenopathy:     She has no cervical adenopathy.   Neurological: She is alert and oriented to person, place, and time.   Skin: Skin is warm and dry.   Psychiatric: She has a normal mood and affect. Her behavior is normal. Thought content normal.       Procedure:      Discussion/Summary:    Hearing loss-improved  hyperlipidemia-labs on rtc  Depression-off tx  htn-stable  vit d def-recheck noted  Lumbar strain-advised cont HEP  Diverticulosis-increase fiber  Insomnia-melatonin prn  Allergic rhinitis-flonase prn  Memory impairment-change to namenda      labs noted and dw patient        Current Outpatient Medications:   •  aspirin 81 MG tablet, Take 1 tablet by mouth daily., Disp: , Rfl:   •  atorvastatin (LIPITOR) 10 MG tablet, TAKE 1 TABLET EVERY NIGHT AT BEDTIME, Disp: 30 tablet, Rfl: 5  •  estradiol (ESTRACE) 0.5 MG tablet, Take 1 tablet by mouth Daily., Disp: 14 tablet, Rfl: 0  •  fluticasone (FLONASE) 50 MCG/ACT nasal spray, 2 SPRAYS INTO EACH NOSTRIL DAILY FOR 30 DAYS., Disp: 1 bottle, Rfl: 5  •  hydrOXYzine (ATARAX) 25 MG tablet, 1-2 po tid prn, Disp: 45 tablet, Rfl: 0  •  lisinopril (PRINIVIL,ZESTRIL) 5 MG tablet, Take 1 tablet by mouth Every Night., Disp: 90 tablet, Rfl: 3  •  ondansetron (ZOFRAN) 4 MG tablet, Take 1 tablet by mouth Every 8 (Eight) Hours As Needed for Nausea or Vomiting., Disp: 20 tablet, Rfl: 0  •  triamcinolone (KENALOG) 0.5 % cream, Apply  topically 2 (Two) Times a Day., Disp: 30 g, Rfl: 1  •  vitamin D (ERGOCALCIFEROL) 37073 units capsule capsule, TAKE 1 CAPSULE BY MOUTH EVERY WEEK, Disp: 12 capsule, Rfl: 3  •  memantine  (NAMENDA) 5 MG tablet, Take 1 tablet by mouth 2 (Two) Times a Day., Disp: 60 tablet, Rfl: 5        Rebecca was seen today for hyperlipidemia and hypertension.    Diagnoses and all orders for this visit:    Essential hypertension  -     lisinopril (PRINIVIL,ZESTRIL) 5 MG tablet; Take 1 tablet by mouth Every Night.    Other hyperlipidemia    Seasonal allergic rhinitis due to pollen    Vitamin D deficiency    Diverticulosis of large intestine without hemorrhage    MCI (mild cognitive impairment)  -     memantine (NAMENDA) 5 MG tablet; Take 1 tablet by mouth 2 (Two) Times a Day.    Other depression    Anxiety

## 2019-06-17 RX ORDER — ERGOCALCIFEROL 1.25 MG/1
CAPSULE ORAL
Qty: 12 CAPSULE | Refills: 3 | Status: SHIPPED | OUTPATIENT
Start: 2019-06-17 | End: 2021-01-01

## 2019-09-03 ENCOUNTER — OFFICE VISIT (OUTPATIENT)
Dept: INTERNAL MEDICINE | Facility: CLINIC | Age: 69
End: 2019-09-03

## 2019-09-03 VITALS
BODY MASS INDEX: 22.13 KG/M2 | WEIGHT: 129.6 LBS | HEIGHT: 64 IN | DIASTOLIC BLOOD PRESSURE: 76 MMHG | HEART RATE: 68 BPM | SYSTOLIC BLOOD PRESSURE: 120 MMHG

## 2019-09-03 DIAGNOSIS — E55.9 VITAMIN D DEFICIENCY: ICD-10-CM

## 2019-09-03 DIAGNOSIS — I10 ESSENTIAL HYPERTENSION: Primary | ICD-10-CM

## 2019-09-03 DIAGNOSIS — F32.89 OTHER DEPRESSION: ICD-10-CM

## 2019-09-03 DIAGNOSIS — G31.84 MCI (MILD COGNITIVE IMPAIRMENT): ICD-10-CM

## 2019-09-03 DIAGNOSIS — K57.30 DIVERTICULOSIS OF LARGE INTESTINE WITHOUT HEMORRHAGE: ICD-10-CM

## 2019-09-03 DIAGNOSIS — J30.1 SEASONAL ALLERGIC RHINITIS DUE TO POLLEN: ICD-10-CM

## 2019-09-03 DIAGNOSIS — E78.49 OTHER HYPERLIPIDEMIA: ICD-10-CM

## 2019-09-03 PROBLEM — M79.601 RIGHT ARM PAIN: Status: RESOLVED | Noted: 2017-03-03 | Resolved: 2019-09-03

## 2019-09-03 PROCEDURE — 99214 OFFICE O/P EST MOD 30 MIN: CPT | Performed by: INTERNAL MEDICINE

## 2019-09-03 NOTE — PROGRESS NOTES
Patient is a 68 y.o. female who is here for a follow up of hyperlipidemia.  Chief Complaint   Patient presents with   • Hyperlipidemia         HPI:    Here for f/u of HTN.  Onset years.  Controlled with ACE.  No dizziness or lightheadedness. Rare HAs.  No ankle edema.  BP not being checked.  No CP.    Memory is still an issue.  Has tolerated 1/2 in am.  Tried whole pill and developed some diarrhea.  No behavior problems.    History:     Patient Active Problem List   Diagnosis   • Anxiety   • Depression   • Diverticulosis of intestine   • Hyperlipidemia   • Hypertension   • Vitamin D deficiency   • Strain of lumbar paraspinal muscle   • Seasonal allergic rhinitis due to pollen   • Bilateral hearing loss   • Asymptomatic gallstones   • MCI (mild cognitive impairment)       No past medical history on file.    Past Surgical History:   Procedure Laterality Date   • HYSTERECTOMY         Current Outpatient Medications on File Prior to Visit   Medication Sig   • aspirin 81 MG tablet Take 1 tablet by mouth daily.   • atorvastatin (LIPITOR) 10 MG tablet TAKE 1 TABLET EVERY NIGHT AT BEDTIME   • estradiol (ESTRACE) 0.5 MG tablet Take 1 tablet by mouth Daily.   • lisinopril (PRINIVIL,ZESTRIL) 5 MG tablet Take 1 tablet by mouth Every Night.   • memantine (NAMENDA) 5 MG tablet Take 1 tablet by mouth 2 (Two) Times a Day.   • vitamin D (ERGOCALCIFEROL) 60009 units capsule capsule TAKE 1 CAPSULE BY MOUTH EVERY WEEK   • fluticasone (FLONASE) 50 MCG/ACT nasal spray 2 SPRAYS INTO EACH NOSTRIL DAILY FOR 30 DAYS.   • [DISCONTINUED] hydrOXYzine (ATARAX) 25 MG tablet 1-2 po tid prn   • [DISCONTINUED] ondansetron (ZOFRAN) 4 MG tablet Take 1 tablet by mouth Every 8 (Eight) Hours As Needed for Nausea or Vomiting.   • [DISCONTINUED] triamcinolone (KENALOG) 0.5 % cream Apply  topically 2 (Two) Times a Day.     No current facility-administered medications on file prior to visit.        Family History   Problem Relation Age of Onset   • Heart  "disease Father    • Breast cancer Neg Hx    • Ovarian cancer Neg Hx        Social History     Socioeconomic History   • Marital status:      Spouse name: Not on file   • Number of children: Not on file   • Years of education: Not on file   • Highest education level: Not on file   Tobacco Use   • Smoking status: Never Smoker         Review of Systems   Constitutional: Negative for chills, diaphoresis, fever and unexpected weight change.   HENT: Negative for congestion, ear pain, hearing loss, nosebleeds, postnasal drip, sinus pressure and sore throat.    Eyes: Negative for pain, discharge and itching.   Respiratory: Negative for cough, chest tightness, shortness of breath and wheezing.    Cardiovascular: Negative for chest pain, palpitations and leg swelling.   Gastrointestinal: Negative for abdominal distention, abdominal pain, blood in stool, constipation, diarrhea, nausea and vomiting.        10/15 colonoscopy without polyps   Endocrine: Negative for polydipsia and polyuria.   Genitourinary: Negative for difficulty urinating, dysuria, frequency and hematuria.   Musculoskeletal: Positive for arthralgias. Negative for back pain, gait problem, joint swelling and myalgias.   Skin: Negative for rash and wound.   Neurological: Positive for headaches. Negative for dizziness, syncope, weakness and numbness.        Memory problems   Psychiatric/Behavioral: Positive for decreased concentration. The patient is not nervous/anxious.        /76   Pulse 68   Ht 162.6 cm (64.02\")   Wt 58.8 kg (129 lb 9.6 oz)   LMP  (LMP Unknown)   BMI 22.23 kg/m²       Physical Exam   Constitutional: She is oriented to person, place, and time. She appears well-developed and well-nourished.   HENT:   Head: Normocephalic and atraumatic.   Right Ear: External ear normal.   Left Ear: External ear normal.   Mouth/Throat: Oropharynx is clear and moist.   Eyes: Conjunctivae and EOM are normal.   Neck: Normal range of motion. Neck " supple.   Cardiovascular: Normal rate, regular rhythm and normal heart sounds.   Pulmonary/Chest: Effort normal and breath sounds normal.   Abdominal: Soft. Bowel sounds are normal.   Musculoskeletal: Normal range of motion. She exhibits no tenderness.   Lymphadenopathy:     She has no cervical adenopathy.   Neurological: She is alert and oriented to person, place, and time.   Skin: Skin is warm and dry.   Psychiatric: She has a normal mood and affect. Her behavior is normal. Thought content normal.       Procedure:      Discussion/Summary:    hyperlipidemia-labs soon, 12/18 at goal  Depression-off tx, stable  htn-stable on ACE  vit d def-recheck on 12/18 at goal  Diverticulosis-increase fiber on daily basis  Insomnia-melatonin prn, stable  Allergic rhinitis-flonase prn, stable  Memory impairment-worsening, advised to attempt to take 1/2 bid of the namenda      10/19/18 labs noted and dw patient       Current Outpatient Medications:   •  aspirin 81 MG tablet, Take 1 tablet by mouth daily., Disp: , Rfl:   •  atorvastatin (LIPITOR) 10 MG tablet, TAKE 1 TABLET EVERY NIGHT AT BEDTIME, Disp: 30 tablet, Rfl: 5  •  estradiol (ESTRACE) 0.5 MG tablet, Take 1 tablet by mouth Daily., Disp: 14 tablet, Rfl: 0  •  lisinopril (PRINIVIL,ZESTRIL) 5 MG tablet, Take 1 tablet by mouth Every Night., Disp: 90 tablet, Rfl: 3  •  memantine (NAMENDA) 5 MG tablet, Take 1 tablet by mouth 2 (Two) Times a Day., Disp: 60 tablet, Rfl: 5  •  vitamin D (ERGOCALCIFEROL) 82824 units capsule capsule, TAKE 1 CAPSULE BY MOUTH EVERY WEEK, Disp: 12 capsule, Rfl: 3  •  fluticasone (FLONASE) 50 MCG/ACT nasal spray, 2 SPRAYS INTO EACH NOSTRIL DAILY FOR 30 DAYS., Disp: 1 bottle, Rfl: 5        Rebecca was seen today for hyperlipidemia.    Diagnoses and all orders for this visit:    Essential hypertension    Other hyperlipidemia    Seasonal allergic rhinitis due to pollen    Vitamin D deficiency    Diverticulosis of large intestine without hemorrhage    MCI  (mild cognitive impairment)    Other depression

## 2019-09-06 ENCOUNTER — LAB (OUTPATIENT)
Dept: INTERNAL MEDICINE | Facility: CLINIC | Age: 69
End: 2019-09-06

## 2019-09-06 DIAGNOSIS — I10 ESSENTIAL HYPERTENSION: ICD-10-CM

## 2019-09-06 DIAGNOSIS — E78.2 MIXED HYPERLIPIDEMIA: Primary | ICD-10-CM

## 2019-09-06 DIAGNOSIS — E55.9 VITAMIN D DEFICIENCY: ICD-10-CM

## 2019-09-06 LAB
25(OH)D3 SERPL-MCNC: 48.2 NG/ML (ref 30–100)
ALBUMIN SERPL-MCNC: 4.4 G/DL (ref 3.5–5.2)
ALBUMIN/GLOB SERPL: 1.6 G/DL
ALP SERPL-CCNC: 87 U/L (ref 39–117)
ALT SERPL W P-5'-P-CCNC: 17 U/L (ref 1–33)
ANION GAP SERPL CALCULATED.3IONS-SCNC: 7.5 MMOL/L (ref 5–15)
AST SERPL-CCNC: 26 U/L (ref 1–32)
BILIRUB SERPL-MCNC: 0.4 MG/DL (ref 0.2–1.2)
BUN BLD-MCNC: 14 MG/DL (ref 8–23)
BUN/CREAT SERPL: 16.9 (ref 7–25)
CALCIUM SPEC-SCNC: 9.4 MG/DL (ref 8.6–10.5)
CHLORIDE SERPL-SCNC: 108 MMOL/L (ref 98–107)
CHOLEST SERPL-MCNC: 252 MG/DL (ref 0–200)
CO2 SERPL-SCNC: 29.5 MMOL/L (ref 22–29)
CREAT BLD-MCNC: 0.83 MG/DL (ref 0.57–1)
DEPRECATED RDW RBC AUTO: 46 FL (ref 37–54)
ERYTHROCYTE [DISTWIDTH] IN BLOOD BY AUTOMATED COUNT: 12.8 % (ref 12.3–15.4)
GFR SERPL CREATININE-BSD FRML MDRD: 68 ML/MIN/1.73
GLOBULIN UR ELPH-MCNC: 2.7 GM/DL
GLUCOSE BLD-MCNC: 85 MG/DL (ref 65–99)
HCT VFR BLD AUTO: 42.1 % (ref 34–46.6)
HDLC SERPL-MCNC: 71 MG/DL (ref 40–60)
HGB BLD-MCNC: 13.1 G/DL (ref 12–15.9)
LDLC SERPL CALC-MCNC: 164 MG/DL (ref 0–100)
LDLC/HDLC SERPL: 2.31 {RATIO}
MCH RBC QN AUTO: 30.3 PG (ref 26.6–33)
MCHC RBC AUTO-ENTMCNC: 31.1 G/DL (ref 31.5–35.7)
MCV RBC AUTO: 97.5 FL (ref 79–97)
PLATELET # BLD AUTO: 319 10*3/MM3 (ref 140–450)
PMV BLD AUTO: 10.7 FL (ref 6–12)
POTASSIUM BLD-SCNC: 4.2 MMOL/L (ref 3.5–5.2)
PROT SERPL-MCNC: 7.1 G/DL (ref 6–8.5)
RBC # BLD AUTO: 4.32 10*6/MM3 (ref 3.77–5.28)
SODIUM BLD-SCNC: 145 MMOL/L (ref 136–145)
TRIGL SERPL-MCNC: 84 MG/DL (ref 0–150)
TSH SERPL DL<=0.05 MIU/L-ACNC: 1.72 UIU/ML (ref 0.27–4.2)
VIT B12 BLD-MCNC: 868 PG/ML (ref 211–946)
VLDLC SERPL-MCNC: 16.8 MG/DL (ref 5–40)
WBC NRBC COR # BLD: 7 10*3/MM3 (ref 3.4–10.8)

## 2019-09-06 PROCEDURE — 82607 VITAMIN B-12: CPT | Performed by: INTERNAL MEDICINE

## 2019-09-06 PROCEDURE — 80061 LIPID PANEL: CPT | Performed by: INTERNAL MEDICINE

## 2019-09-06 PROCEDURE — 84443 ASSAY THYROID STIM HORMONE: CPT | Performed by: INTERNAL MEDICINE

## 2019-09-06 PROCEDURE — 82306 VITAMIN D 25 HYDROXY: CPT | Performed by: INTERNAL MEDICINE

## 2019-09-06 PROCEDURE — 85027 COMPLETE CBC AUTOMATED: CPT | Performed by: INTERNAL MEDICINE

## 2019-09-06 PROCEDURE — 80053 COMPREHEN METABOLIC PANEL: CPT | Performed by: INTERNAL MEDICINE

## 2019-10-01 ENCOUNTER — DOCUMENTATION (OUTPATIENT)
Dept: PHYSICAL THERAPY | Facility: CLINIC | Age: 69
End: 2019-10-01

## 2019-10-01 ENCOUNTER — OFFICE VISIT (OUTPATIENT)
Dept: INTERNAL MEDICINE | Facility: CLINIC | Age: 69
End: 2019-10-01

## 2019-10-01 VITALS
DIASTOLIC BLOOD PRESSURE: 70 MMHG | HEART RATE: 68 BPM | WEIGHT: 130.2 LBS | BODY MASS INDEX: 22.23 KG/M2 | SYSTOLIC BLOOD PRESSURE: 120 MMHG | HEIGHT: 64 IN

## 2019-10-01 DIAGNOSIS — J30.1 SEASONAL ALLERGIC RHINITIS DUE TO POLLEN: ICD-10-CM

## 2019-10-01 DIAGNOSIS — G47.09 OTHER INSOMNIA: ICD-10-CM

## 2019-10-01 DIAGNOSIS — K57.30 DIVERTICULOSIS OF LARGE INTESTINE WITHOUT HEMORRHAGE: ICD-10-CM

## 2019-10-01 DIAGNOSIS — I10 ESSENTIAL HYPERTENSION: Primary | ICD-10-CM

## 2019-10-01 DIAGNOSIS — E78.2 MIXED HYPERLIPIDEMIA: ICD-10-CM

## 2019-10-01 DIAGNOSIS — G31.84 MCI (MILD COGNITIVE IMPAIRMENT): ICD-10-CM

## 2019-10-01 DIAGNOSIS — E55.9 VITAMIN D DEFICIENCY: ICD-10-CM

## 2019-10-01 PROCEDURE — 99214 OFFICE O/P EST MOD 30 MIN: CPT | Performed by: INTERNAL MEDICINE

## 2019-10-01 RX ORDER — TRAZODONE HYDROCHLORIDE 50 MG/1
TABLET ORAL
Qty: 30 TABLET | Refills: 5 | Status: SHIPPED | OUTPATIENT
Start: 2019-10-01 | End: 2020-02-21 | Stop reason: SDUPTHER

## 2019-10-01 RX ORDER — DONEPEZIL HYDROCHLORIDE 5 MG/1
5 TABLET, FILM COATED ORAL NIGHTLY
COMMUNITY
End: 2020-01-17

## 2019-10-01 NOTE — PROGRESS NOTES
PATIENT CAME IN AFTER SEEING DR COX ASKING FOR THE BACK HEP THAT SHE WAS GIVEN IN 2016.  THE HEP WAS NOT IN THE SYSTEM AND I EXPLAINED TO THE PATIENT THAT WITHOUT IT BEING IN HER CHART I WAS NOT ABLE TO GIVE HER COPIES OF NEW EXERCISES WITHOUT HER SEEING A THERAPIST. SHE SAID THAT DR COX WANTED HER TO HAVE COPIES OF THE HEP FOR HER BACK.  I EXPLAINED THAT WE WOULD NEED TO HAVE A REFERRAL FROM HER DOCTOR AND SHE WOULD HAVE TO BE SEEN AS A NEW PATIENT.  SHE GOT VERY UPSET AND LEFT THE OFFICE YELLING.

## 2019-10-01 NOTE — PROGRESS NOTES
"Patient is a 68 y.o. female who is here for a follow up of hyperlipidemia and hypertension.  Chief Complaint   Patient presents with   • Hyperlipidemia   • Hypertension         HPI:    Here for mgmt of HTN.  No dizziness or lightheadedness.  No CP.  No longer taking the lipitor bc she was worried it would affect memory.  Tolerating the lipitor.  No HAs.  Energy level is ok.  Sleeping is not the best.  Drinks \"alittle red wine\".  No abdominal pains.      History:     Patient Active Problem List   Diagnosis   • Anxiety   • Depression   • Diverticulosis of intestine   • Hyperlipidemia   • Hypertension   • Vitamin D deficiency   • Strain of lumbar paraspinal muscle   • Seasonal allergic rhinitis due to pollen   • Bilateral hearing loss   • Asymptomatic gallstones   • MCI (mild cognitive impairment)   • Other insomnia       No past medical history on file.    Past Surgical History:   Procedure Laterality Date   • HYSTERECTOMY         Current Outpatient Medications on File Prior to Visit   Medication Sig   • aspirin 81 MG tablet Take 1 tablet by mouth daily.   • donepezil (ARICEPT) 5 MG tablet Take 5 mg by mouth Every Night.   • estradiol (ESTRACE) 0.5 MG tablet Take 1 tablet by mouth Daily.   • fluticasone (FLONASE) 50 MCG/ACT nasal spray 2 SPRAYS INTO EACH NOSTRIL DAILY FOR 30 DAYS.   • lisinopril (PRINIVIL,ZESTRIL) 5 MG tablet Take 1 tablet by mouth Every Night.   • vitamin D (ERGOCALCIFEROL) 94863 units capsule capsule TAKE 1 CAPSULE BY MOUTH EVERY WEEK   • [DISCONTINUED] atorvastatin (LIPITOR) 10 MG tablet TAKE 1 TABLET EVERY NIGHT AT BEDTIME   • [DISCONTINUED] memantine (NAMENDA) 5 MG tablet Take 1 tablet by mouth 2 (Two) Times a Day.     No current facility-administered medications on file prior to visit.        Family History   Problem Relation Age of Onset   • Heart disease Father    • Breast cancer Neg Hx    • Ovarian cancer Neg Hx        Social History     Socioeconomic History   • Marital status:      " "Spouse name: Not on file   • Number of children: Not on file   • Years of education: Not on file   • Highest education level: Not on file   Tobacco Use   • Smoking status: Never Smoker         Review of Systems   Constitutional: Negative for chills, diaphoresis, fever and unexpected weight change.   HENT: Negative for congestion, ear pain, hearing loss, nosebleeds, postnasal drip, sinus pressure and sore throat.    Eyes: Negative for pain, discharge and itching.   Respiratory: Negative for cough, chest tightness, shortness of breath and wheezing.    Cardiovascular: Negative for chest pain, palpitations and leg swelling.   Gastrointestinal: Negative for abdominal distention, abdominal pain, blood in stool, constipation, diarrhea, nausea and vomiting.        10/15 colonoscopy without polyps   Endocrine: Negative for polydipsia and polyuria.   Genitourinary: Negative for difficulty urinating, dysuria, frequency and hematuria.   Musculoskeletal: Positive for arthralgias. Negative for back pain, gait problem, joint swelling and myalgias.   Skin: Negative for rash and wound.   Neurological: Negative for dizziness, syncope, weakness and numbness.        Memory problems   Psychiatric/Behavioral: Positive for decreased concentration and sleep disturbance. The patient is not nervous/anxious.        /70   Pulse 68   Ht 162.6 cm (64.02\")   Wt 59.1 kg (130 lb 3.2 oz)   LMP  (LMP Unknown)   BMI 22.34 kg/m²       Physical Exam   Constitutional: She is oriented to person, place, and time. She appears well-developed and well-nourished.   HENT:   Head: Normocephalic and atraumatic.   Right Ear: External ear normal.   Left Ear: External ear normal.   Mouth/Throat: Oropharynx is clear and moist.   Eyes: Conjunctivae and EOM are normal.   Neck: Normal range of motion. Neck supple.   Cardiovascular: Normal rate, regular rhythm and normal heart sounds.   Pulmonary/Chest: Effort normal and breath sounds normal.   Abdominal: " Soft. Bowel sounds are normal.   Musculoskeletal: Normal range of motion. She exhibits no tenderness.   Lymphadenopathy:     She has no cervical adenopathy.   Neurological: She is alert and oriented to person, place, and time.   Skin: Skin is warm and dry.   Psychiatric: She has a normal mood and affect. Her behavior is normal. Thought content normal.       Procedure:      Discussion/Summary:    hyperlipidemia-labs 9/16 not at goal, not interested in tx, counseled on diet  htn-stable on ACE  vit d def-recheck on 9/6  at goal  Diverticulosis-increase fiber on daily basis  Insomnia-melatonin prn, but add trazodone  Allergic rhinitis-flonase prn, stable  Memory impairment-stable, cont Aricept 5 mg      9/6 labs noted and dw patient, refusing flu shot    Current Outpatient Medications:   •  aspirin 81 MG tablet, Take 1 tablet by mouth daily., Disp: , Rfl:   •  donepezil (ARICEPT) 5 MG tablet, Take 5 mg by mouth Every Night., Disp: , Rfl:   •  estradiol (ESTRACE) 0.5 MG tablet, Take 1 tablet by mouth Daily., Disp: 14 tablet, Rfl: 0  •  fluticasone (FLONASE) 50 MCG/ACT nasal spray, 2 SPRAYS INTO EACH NOSTRIL DAILY FOR 30 DAYS., Disp: 1 bottle, Rfl: 5  •  lisinopril (PRINIVIL,ZESTRIL) 5 MG tablet, Take 1 tablet by mouth Every Night., Disp: 90 tablet, Rfl: 3  •  vitamin D (ERGOCALCIFEROL) 82717 units capsule capsule, TAKE 1 CAPSULE BY MOUTH EVERY WEEK, Disp: 12 capsule, Rfl: 3  •  traZODone (DESYREL) 50 MG tablet, 1/2 to 1 tablet at bed for sleep, Disp: 30 tablet, Rfl: 5        Rebecca was seen today for hyperlipidemia and hypertension.    Diagnoses and all orders for this visit:    Essential hypertension    Mixed hyperlipidemia    Vitamin D deficiency    Seasonal allergic rhinitis due to pollen    Diverticulosis of large intestine without hemorrhage    MCI (mild cognitive impairment)    Other insomnia  -     traZODone (DESYREL) 50 MG tablet; 1/2 to 1 tablet at bed for sleep

## 2019-11-23 ENCOUNTER — HOSPITAL ENCOUNTER (EMERGENCY)
Facility: HOSPITAL | Age: 69
Discharge: HOME | End: 2019-11-23
Attending: EMERGENCY MEDICINE | Admitting: EMERGENCY MEDICINE

## 2019-11-23 VITALS
BODY MASS INDEX: 22.15 KG/M2 | HEIGHT: 63 IN | SYSTOLIC BLOOD PRESSURE: 137 MMHG | OXYGEN SATURATION: 96 % | HEART RATE: 74 BPM | WEIGHT: 125 LBS | RESPIRATION RATE: 20 BRPM | TEMPERATURE: 97.7 F | DIASTOLIC BLOOD PRESSURE: 71 MMHG

## 2019-11-23 DIAGNOSIS — R41.82 ALTERED MENTAL STATUS, UNSPECIFIED ALTERED MENTAL STATUS TYPE: Primary | ICD-10-CM

## 2019-11-23 DIAGNOSIS — E86.0 DEHYDRATION: ICD-10-CM

## 2019-11-23 DIAGNOSIS — F03.90 DEMENTIA WITHOUT BEHAVIORAL DISTURBANCE, UNSPECIFIED DEMENTIA TYPE: ICD-10-CM

## 2019-11-23 LAB
ALBUMIN SERPL-MCNC: 4.2 G/DL (ref 3.5–5.2)
ALBUMIN/GLOB SERPL: 1.2 G/DL
ALP SERPL-CCNC: 104 U/L (ref 39–117)
ALT SERPL W P-5'-P-CCNC: 19 U/L (ref 1–33)
ANION GAP SERPL CALCULATED.3IONS-SCNC: 20 MMOL/L (ref 5–15)
AST SERPL-CCNC: 27 U/L (ref 1–32)
BACTERIA UR QL AUTO: ABNORMAL /HPF
BASOPHILS # BLD AUTO: 0.05 10*3/MM3 (ref 0–0.2)
BASOPHILS NFR BLD AUTO: 0.5 % (ref 0–1.5)
BILIRUB SERPL-MCNC: 0.4 MG/DL (ref 0.2–1.2)
BILIRUB UR QL STRIP: NEGATIVE
BUN BLD-MCNC: 13 MG/DL (ref 8–23)
BUN/CREAT SERPL: 14.3 (ref 7–25)
CALCIUM SPEC-SCNC: 9.5 MG/DL (ref 8.6–10.5)
CHLORIDE SERPL-SCNC: 103 MMOL/L (ref 98–107)
CLARITY UR: CLEAR
CO2 SERPL-SCNC: 20 MMOL/L (ref 22–29)
COLOR UR: YELLOW
CREAT BLD-MCNC: 0.91 MG/DL (ref 0.57–1)
DEPRECATED RDW RBC AUTO: 41.8 FL (ref 37–54)
EOSINOPHIL # BLD AUTO: 0 10*3/MM3 (ref 0–0.4)
EOSINOPHIL NFR BLD AUTO: 0 % (ref 0.3–6.2)
ERYTHROCYTE [DISTWIDTH] IN BLOOD BY AUTOMATED COUNT: 12.5 % (ref 12.3–15.4)
GFR SERPL CREATININE-BSD FRML MDRD: 61 ML/MIN/1.73
GLOBULIN UR ELPH-MCNC: 3.4 GM/DL
GLUCOSE BLD-MCNC: 212 MG/DL (ref 65–99)
GLUCOSE UR STRIP-MCNC: ABNORMAL MG/DL
HCT VFR BLD AUTO: 39.4 % (ref 34–46.6)
HGB BLD-MCNC: 13 G/DL (ref 12–15.9)
HGB UR QL STRIP.AUTO: ABNORMAL
HYALINE CASTS UR QL AUTO: ABNORMAL /LPF
IMM GRANULOCYTES # BLD AUTO: 0.04 10*3/MM3 (ref 0–0.05)
IMM GRANULOCYTES NFR BLD AUTO: 0.4 % (ref 0–0.5)
KETONES UR QL STRIP: ABNORMAL
LEUKOCYTE ESTERASE UR QL STRIP.AUTO: NEGATIVE
LYMPHOCYTES # BLD AUTO: 0.82 10*3/MM3 (ref 0.7–3.1)
LYMPHOCYTES NFR BLD AUTO: 7.5 % (ref 19.6–45.3)
MCH RBC QN AUTO: 30.4 PG (ref 26.6–33)
MCHC RBC AUTO-ENTMCNC: 33 G/DL (ref 31.5–35.7)
MCV RBC AUTO: 92.1 FL (ref 79–97)
MONOCYTES # BLD AUTO: 0.28 10*3/MM3 (ref 0.1–0.9)
MONOCYTES NFR BLD AUTO: 2.6 % (ref 5–12)
NEUTROPHILS # BLD AUTO: 9.76 10*3/MM3 (ref 1.7–7)
NEUTROPHILS NFR BLD AUTO: 89 % (ref 42.7–76)
NITRITE UR QL STRIP: NEGATIVE
NRBC BLD AUTO-RTO: 0 /100 WBC (ref 0–0.2)
PH UR STRIP.AUTO: 6 [PH] (ref 5–8)
PLATELET # BLD AUTO: 338 10*3/MM3 (ref 140–450)
PMV BLD AUTO: 9.7 FL (ref 6–12)
POTASSIUM BLD-SCNC: 3.7 MMOL/L (ref 3.5–5.2)
PROT SERPL-MCNC: 7.6 G/DL (ref 6–8.5)
PROT UR QL STRIP: ABNORMAL
RBC # BLD AUTO: 4.28 10*6/MM3 (ref 3.77–5.28)
RBC # UR: ABNORMAL /HPF
REF LAB TEST METHOD: ABNORMAL
SODIUM BLD-SCNC: 143 MMOL/L (ref 136–145)
SP GR UR STRIP: 1.02 (ref 1–1.03)
SQUAMOUS #/AREA URNS HPF: ABNORMAL /HPF
UROBILINOGEN UR QL STRIP: ABNORMAL
WBC NRBC COR # BLD: 10.95 10*3/MM3 (ref 3.4–10.8)
WBC UR QL AUTO: ABNORMAL /HPF

## 2019-11-23 PROCEDURE — P9612 CATHETERIZE FOR URINE SPEC: HCPCS

## 2019-11-23 PROCEDURE — 99284 EMERGENCY DEPT VISIT MOD MDM: CPT

## 2019-11-23 PROCEDURE — 85025 COMPLETE CBC W/AUTO DIFF WBC: CPT | Performed by: EMERGENCY MEDICINE

## 2019-11-23 PROCEDURE — 25010000002 ONDANSETRON PER 1 MG: Performed by: EMERGENCY MEDICINE

## 2019-11-23 PROCEDURE — 81001 URINALYSIS AUTO W/SCOPE: CPT | Performed by: EMERGENCY MEDICINE

## 2019-11-23 PROCEDURE — 96374 THER/PROPH/DIAG INJ IV PUSH: CPT

## 2019-11-23 PROCEDURE — 80053 COMPREHEN METABOLIC PANEL: CPT | Performed by: EMERGENCY MEDICINE

## 2019-11-23 RX ORDER — ONDANSETRON 2 MG/ML
4 INJECTION INTRAMUSCULAR; INTRAVENOUS ONCE
Status: COMPLETED | OUTPATIENT
Start: 2019-11-23 | End: 2019-11-23

## 2019-11-23 RX ADMIN — SODIUM CHLORIDE 500 ML: 9 INJECTION, SOLUTION INTRAVENOUS at 20:30

## 2019-11-23 RX ADMIN — SODIUM CHLORIDE 500 ML: 9 INJECTION, SOLUTION INTRAVENOUS at 19:45

## 2019-11-23 RX ADMIN — ONDANSETRON 4 MG: 2 INJECTION INTRAMUSCULAR; INTRAVENOUS at 19:45

## 2019-11-24 NOTE — DISCHARGE INSTRUCTIONS
Push fluids and aim to have urine as clear as possible. A mixture of Gatorade and water is recommended. Follow up with Dr. Faustin and Dr. Boswell. Return to the ED if symptoms worsen.     Please review the medications you are supposed to be taking according to prior physician recommendations. I have not changed your home medications during this visit. If your discharge instructions indicate that I have changed your home medications, this is not the case, and you should disregard. If you have any questions about the medication you should be taking at home, please call your physician.

## 2019-11-24 NOTE — ED PROVIDER NOTES
Subjective   Rebecca Terrazas is a 68 year old female who presents to the ED complaining of GI symptoms. Her family reports that Ms. Terrazas has been having progressive memory problems for the past few months but this morning she woke up at 1100 and did not recognize her  for the first time. She also did not recognize her surroundings. At 1700, her daughter went to visit her and notes that the patient did recognize her. Her  reports that the patient then took a bath, washed her hair, and seemed generally well. However, shortly after her bath, she began to pant. Her  reports getting her a glass of water and helping her to take deep breaths. She laid in bed for a short time period, then went to the bathroom and had an episode of vomiting. Her family reports that she is not as active as her baseline and does not drink fluids regularly. The patient denies dysuria and her family denies a cough, though she does have mild rhinorrhea. The patient also denies nausea at this time, though she has been given a dose of Zofran. She has a history of intermittent dark stools as well as a memory impairment and expressive aphasia. Her family believes this to be due to dementia; however, she has not received a diagnosis. Her stool has also never been tested for blood. The patient has no history of severe pneumonia or urinary tract infections. She takes Aricept for her cognitive impairment symptoms. There are no other acute complaints at this time.        History provided by:  Relative and patient  Nausea   The primary symptoms include vomiting and melena. Primary symptoms do not include nausea or dysuria. The illness began today. The onset was sudden. The problem has been resolved.       Review of Systems   Constitutional: Positive for activity change (Decrease in activity from baseline).   HENT: Positive for rhinorrhea (Mild).    Respiratory: Negative for cough.    Gastrointestinal: Positive for melena and vomiting.  Negative for nausea.   Genitourinary: Negative for dysuria.   All other systems reviewed and are negative.      Past Medical History:   Diagnosis Date   • Dementia (CMS/HCC)    • Hypertension        Allergies   Allergen Reactions   • Erythromycin Nausea And Vomiting       Past Surgical History:   Procedure Laterality Date   • HYSTERECTOMY         Family History   Problem Relation Age of Onset   • Heart disease Father    • Breast cancer Neg Hx    • Ovarian cancer Neg Hx        Social History     Socioeconomic History   • Marital status:      Spouse name: Not on file   • Number of children: Not on file   • Years of education: Not on file   • Highest education level: Not on file   Tobacco Use   • Smoking status: Never Smoker   Substance and Sexual Activity   • Alcohol use: No     Frequency: Never   • Drug use: No   • Sexual activity: Defer         Objective   Physical Exam   Constitutional: She appears well-developed and well-nourished.  Non-toxic appearance. No distress.   Slightly somnolent   HENT:   Head: Normocephalic and atraumatic.   Nose: Nose normal.   Mouth/Throat: Mucous membranes are dry.   Very dry mucus membranes   Eyes: Conjunctivae are normal. No scleral icterus.   Neck: Normal range of motion. Neck supple.   Cardiovascular: Normal rate and regular rhythm.   No murmur heard.  Pulmonary/Chest: Effort normal and breath sounds normal. No respiratory distress.   Abdominal: Soft. There is no tenderness (Completely nontender to deep palpation).   Musculoskeletal: Normal range of motion.   Neurological: She is alert.   Oriented to person and place; disoriented to time (believes it is 1920); no pronator drift; no leg drift; cranial nerves intact to exam   Skin: Skin is warm and dry. There is pallor.   Psychiatric: She has a normal mood and affect.   Nursing note and vitals reviewed.      Procedures         ED Course  ED Course as of Nov 24 1529   Sat Nov 23, 2019 2023 Patient appears in stable status.   We are hydrating her.  I spoke with the patient, , daughter about need for catheterized urinalysis.  The patient and family members are comfortable with this.  [MS]   2656 Dr. Weiss is at bedside re-evaluating the patient, discussing lab results, and updating on plan.  [AT]      ED Course User Index  [AT] Miriam Garrett  [MS] Loi Weiss MD       Recent Results (from the past 24 hour(s))   Comprehensive Metabolic Panel    Collection Time: 11/23/19  7:35 PM   Result Value Ref Range    Glucose 212 (H) 65 - 99 mg/dL    BUN 13 8 - 23 mg/dL    Creatinine 0.91 0.57 - 1.00 mg/dL    Sodium 143 136 - 145 mmol/L    Potassium 3.7 3.5 - 5.2 mmol/L    Chloride 103 98 - 107 mmol/L    CO2 20.0 (L) 22.0 - 29.0 mmol/L    Calcium 9.5 8.6 - 10.5 mg/dL    Total Protein 7.6 6.0 - 8.5 g/dL    Albumin 4.20 3.50 - 5.20 g/dL    ALT (SGPT) 19 1 - 33 U/L    AST (SGOT) 27 1 - 32 U/L    Alkaline Phosphatase 104 39 - 117 U/L    Total Bilirubin 0.4 0.2 - 1.2 mg/dL    eGFR Non African Amer 61 >60 mL/min/1.73    Globulin 3.4 gm/dL    A/G Ratio 1.2 g/dL    BUN/Creatinine Ratio 14.3 7.0 - 25.0    Anion Gap 20.0 (H) 5.0 - 15.0 mmol/L   CBC Auto Differential    Collection Time: 11/23/19  7:35 PM   Result Value Ref Range    WBC 10.95 (H) 3.40 - 10.80 10*3/mm3    RBC 4.28 3.77 - 5.28 10*6/mm3    Hemoglobin 13.0 12.0 - 15.9 g/dL    Hematocrit 39.4 34.0 - 46.6 %    MCV 92.1 79.0 - 97.0 fL    MCH 30.4 26.6 - 33.0 pg    MCHC 33.0 31.5 - 35.7 g/dL    RDW 12.5 12.3 - 15.4 %    RDW-SD 41.8 37.0 - 54.0 fl    MPV 9.7 6.0 - 12.0 fL    Platelets 338 140 - 450 10*3/mm3    Neutrophil % 89.0 (H) 42.7 - 76.0 %    Lymphocyte % 7.5 (L) 19.6 - 45.3 %    Monocyte % 2.6 (L) 5.0 - 12.0 %    Eosinophil % 0.0 (L) 0.3 - 6.2 %    Basophil % 0.5 0.0 - 1.5 %    Immature Grans % 0.4 0.0 - 0.5 %    Neutrophils, Absolute 9.76 (H) 1.70 - 7.00 10*3/mm3    Lymphocytes, Absolute 0.82 0.70 - 3.10 10*3/mm3    Monocytes, Absolute 0.28 0.10 - 0.90 10*3/mm3    Eosinophils,  Absolute 0.00 0.00 - 0.40 10*3/mm3    Basophils, Absolute 0.05 0.00 - 0.20 10*3/mm3    Immature Grans, Absolute 0.04 0.00 - 0.05 10*3/mm3    nRBC 0.0 0.0 - 0.2 /100 WBC   Urinalysis With Culture If Indicated - Urine, Catheter    Collection Time: 11/23/19  8:34 PM   Result Value Ref Range    Color, UA Yellow Yellow, Straw    Appearance, UA Clear Clear    pH, UA 6.0 5.0 - 8.0    Specific Gravity, UA 1.021 1.001 - 1.030    Glucose, UA >=1000 mg/dL (3+) (A) Negative    Ketones, UA >=160 mg/dL (4+) (A) Negative    Bilirubin, UA Negative Negative    Blood, UA Trace (A) Negative    Protein, UA Trace (A) Negative    Leuk Esterase, UA Negative Negative    Nitrite, UA Negative Negative    Urobilinogen, UA 1.0 E.U./dL 0.2 - 1.0 E.U./dL   Urinalysis, Microscopic Only - Urine, Catheter    Collection Time: 11/23/19  8:34 PM   Result Value Ref Range    RBC, UA 3-6 (A) None Seen, 0-2 /HPF    WBC, UA 0-2 None Seen, 0-2 /HPF    Bacteria, UA None Seen None Seen, Trace /HPF    Squamous Epithelial Cells, UA 0-2 None Seen, 0-2 /HPF    Hyaline Casts, UA 7-12 0 - 6 /LPF    Methodology Automated Microscopy      Note: In addition to lab results from this visit, the labs listed above may include labs taken at another facility or during a different encounter within the last 24 hours. Please correlate lab times with ED admission and discharge times for further clarification of the services performed during this visit.    No orders to display     Vitals:    11/23/19 2030 11/23/19 2031 11/23/19 2100 11/23/19 2156   BP: 103/64  123/64 137/71   Pulse:  93 85 74   Resp:    20   Temp:       TempSrc:       SpO2:  99% 99% 96%   Weight:       Height:         Medications   sodium chloride 0.9 % bolus 500 mL (0 mL Intravenous Stopped 11/23/19 2015)   ondansetron (ZOFRAN) injection 4 mg (4 mg Intravenous Given 11/23/19 1945)   sodium chloride 0.9 % bolus 500 mL (0 mL Intravenous Stopped 11/23/19 2100)     ECG/EMG Results (last 24 hours)     ** No results  found for the last 24 hours. **        No orders to display                   MDM    Final diagnoses:   Altered mental status, unspecified altered mental status type   Dementia without behavioral disturbance, unspecified dementia type (CMS/HCC)   Dehydration       Documentation assistance provided by denny Wells.  Information recorded by the scribe was done at my direction and has been verified and validated by me.     Pepper Wells  11/23/19 2118       Miriam Garrett  11/23/19 2141       Miriam Garrett  11/23/19 215       Loi Weiss MD  11/24/19 [id number]

## 2019-11-25 ENCOUNTER — TELEPHONE (OUTPATIENT)
Dept: INTERNAL MEDICINE | Facility: CLINIC | Age: 69
End: 2019-11-25

## 2019-11-25 DIAGNOSIS — G31.84 MCI (MILD COGNITIVE IMPAIRMENT): Primary | ICD-10-CM

## 2019-11-25 DIAGNOSIS — F41.9 ANXIETY: ICD-10-CM

## 2019-11-25 RX ORDER — BUSPIRONE HYDROCHLORIDE 5 MG/1
5 TABLET ORAL 2 TIMES DAILY
Qty: 60 TABLET | Refills: 2 | Status: SHIPPED | OUTPATIENT
Start: 2019-11-25 | End: 2020-02-12 | Stop reason: SDUPTHER

## 2019-11-25 NOTE — TELEPHONE ENCOUNTER
She went to ER over the weekend and was very dehydrated. She had no idea who he was. The ER is recommending that she see dr soto with neurology. They are wanting your opinion

## 2020-01-15 ENCOUNTER — TELEPHONE (OUTPATIENT)
Dept: NEUROLOGY | Facility: CLINIC | Age: 70
End: 2020-01-15

## 2020-01-15 NOTE — TELEPHONE ENCOUNTER
Daughter left me a voicemail asking how to get information/history about patient's symptoms to us ahead of her visit on Friday. I called her back and left message leaving my email.

## 2020-01-17 ENCOUNTER — OFFICE VISIT (OUTPATIENT)
Dept: NEUROLOGY | Facility: CLINIC | Age: 70
End: 2020-01-17

## 2020-01-17 VITALS
DIASTOLIC BLOOD PRESSURE: 88 MMHG | SYSTOLIC BLOOD PRESSURE: 150 MMHG | HEIGHT: 63 IN | BODY MASS INDEX: 22.15 KG/M2 | WEIGHT: 125 LBS | OXYGEN SATURATION: 99 % | HEART RATE: 84 BPM

## 2020-01-17 DIAGNOSIS — F02.80 LATE ONSET ALZHEIMER'S DISEASE WITHOUT BEHAVIORAL DISTURBANCE (HCC): Primary | ICD-10-CM

## 2020-01-17 DIAGNOSIS — G30.1 LATE ONSET ALZHEIMER'S DISEASE WITHOUT BEHAVIORAL DISTURBANCE (HCC): Primary | ICD-10-CM

## 2020-01-17 PROCEDURE — 99205 OFFICE O/P NEW HI 60 MIN: CPT | Performed by: PSYCHIATRY & NEUROLOGY

## 2020-01-17 RX ORDER — ATORVASTATIN CALCIUM 10 MG/1
TABLET, FILM COATED ORAL
COMMUNITY
End: 2021-01-01

## 2020-01-17 RX ORDER — CYCLOSPORINE 0.5 MG/ML
EMULSION OPHTHALMIC
COMMUNITY
End: 2021-01-01

## 2020-01-17 NOTE — PROGRESS NOTES
"Subjective     CC: Memory Loss    History of Present Illness   Rebecca Terrazas is a 69 y.o. female who comes to clinic today for evaluation of memory impairment. Her family has noted symptoms since approximately 2013 marked initially by forgetfulness. This has gradually worsened  over time. Additional associated symptoms have included impairments in orientation , language  and executive function. She denies current anxiety or depression, though she has a history of a \"nervous breakdown\" in about 2008 per family.    Prior evaluation has included screening blood work  which was unremarkable . She was intolerant of donepezil due to diarrhea, though her  thought this medication was helpful.    I have reviewed and confirmed the past family, social and medical history as accurate on 1/17/20.     Review of Systems   Constitutional: Negative.    Respiratory: Negative.    Cardiovascular: Negative.    Gastrointestinal: Negative.    Genitourinary: Negative.    All other systems reviewed and are negative.      Objective   General appearance today is normal.   Peripheral pulses were present and symmetric.   The ophthalmoscopic exam today is unremarkable. The discs and posterior elements are unremarkable.    /88   Pulse 84   Ht 160 cm (63\")   Wt 56.7 kg (125 lb)   LMP  (LMP Unknown)   SpO2 99%   BMI 22.14 kg/m²     Physical Exam   Neurological: She has normal strength. She has a normal Finger-Nose-Finger Test. Gait normal.   Reflex Scores:       Tricep reflexes are 2+ on the right side and 2+ on the left side.       Bicep reflexes are 2+ on the right side and 2+ on the left side.       Brachioradialis reflexes are 2+ on the right side and 2+ on the left side.       Patellar reflexes are 2+ on the right side and 2+ on the left side.       Achilles reflexes are 2+ on the right side and 2+ on the left side.  Psychiatric: Her speech is normal.        Neurologic Exam     Mental Status   Oriented to person. "   Disoriented to place.   Disoriented to time.   Registration: recalls 3 of 3 objects. Recall of objects at 5 minutes: 0/3. Follows 3 step commands.   Attention: normal.   Speech: speech is normal   Level of consciousness: alert  Knowledge: poor.   Able to name object. Able to read. Able to repeat. Able to write. Normal comprehension.     Cranial Nerves   Cranial nerves II through XII intact.     Motor Exam   Muscle bulk: normal  Overall muscle tone: normal    Strength   Strength 5/5 throughout.     Sensory Exam   Light touch normal.     Gait, Coordination, and Reflexes     Gait  Gait: normal    Coordination   Finger to nose coordination: normal    Reflexes   Right brachioradialis: 2+  Left brachioradialis: 2+  Right biceps: 2+  Left biceps: 2+  Right triceps: 2+  Left triceps: 2+  Right patellar: 2+  Left patellar: 2+  Right achilles: 2+  Left achilles: 2+      MMSE=20      Assessment/Plan   Rebecca was seen today for memory loss.    Diagnoses and all orders for this visit:    Late onset Alzheimer's disease without behavioral disturbance (CMS/HCC)  -     CT Head Without Contrast          DISCUSSION/SUMMARY    Rebecca Terrazas comes to clinic today for evaluation of memory impairment. Her history and examination, including bedside cognitive testing are most consistent with Alzheimer's Disease . For now it was elected to obtain a head CT . After discussing potential treatment options, it was elected to add  memantine. I also discussed the potential addition of Exelon patch or Razadyne in the future. She will then follow up in 3 months , or sooner if needed.     As part of this visit I reviewed prior lab results and obtained additional history from the family which is incorporated in the HPI. Please see above for additional details.

## 2020-01-21 ENCOUNTER — TELEPHONE (OUTPATIENT)
Dept: NEUROLOGY | Facility: CLINIC | Age: 70
End: 2020-01-21

## 2020-01-21 RX ORDER — MEMANTINE HYDROCHLORIDE 10 MG/1
10 TABLET ORAL 2 TIMES DAILY
Qty: 60 TABLET | Refills: 11 | Status: SHIPPED | OUTPATIENT
Start: 2020-01-21 | End: 2020-01-21 | Stop reason: SDUPTHER

## 2020-01-21 RX ORDER — MEMANTINE HYDROCHLORIDE 10 MG/1
TABLET ORAL
Qty: 60 TABLET | Refills: 0 | Status: SHIPPED | OUTPATIENT
Start: 2020-01-21 | End: 2021-01-01

## 2020-01-21 NOTE — TELEPHONE ENCOUNTER
Rx sent. But, please make sure they have instruction for titration, or that they were given instructions at her appointment. Thanks.

## 2020-01-21 NOTE — TELEPHONE ENCOUNTER
Called and left  for pt  Claude informing to please follow instructions with titration and to please call office if have any questions or concerns. Thanks.

## 2020-01-21 NOTE — TELEPHONE ENCOUNTER
Called and spoke to pt  Claude regarding message received. Claude stated that it was discussed at last OV to start pt with memantine. Please send into pt pharmacy [facility] or [facility]. Thanks.

## 2020-01-24 ENCOUNTER — TELEPHONE (OUTPATIENT)
Dept: NEUROLOGY | Facility: CLINIC | Age: 70
End: 2020-01-24

## 2020-01-24 ENCOUNTER — TELEPHONE (OUTPATIENT)
Dept: INTERNAL MEDICINE | Facility: CLINIC | Age: 70
End: 2020-01-24

## 2020-01-24 NOTE — TELEPHONE ENCOUNTER
Namenda can help with hallucinations and I'm sorry I didn't realize this had not been started. If she is willing to take this medication, she needs to follow the correct titration: 5 mg daily for 1 week, 10 mg daily for 1 week, 15 mg daily for 1 week and then 20 mg daily thereafter.    I would prefer to avoid Xanax as this can often worsen symptoms of dementia.    If she is having very disturbing hallucinations and trouble with sleep, we can offer Seroquel 25 mg qhs. This is often very helpful for both hallucinations and sleep. The FDA has place a black box warning, however, on this entire class of medications due to an increased risk of stroke/death in elderly patients with dementia.     Thanks

## 2020-01-24 NOTE — TELEPHONE ENCOUNTER
Try holding the Namenda for now to see if this improves. This is a potential uncommon side effect of Namenda. Thanks.

## 2020-01-24 NOTE — TELEPHONE ENCOUNTER
Pt daughter Ella called and stated that pt has been having hallucinations. Inquired if pt is taking recently prescribed medication Namenda as directed and Ella confirmed pt is taking as directed. Informed Ella that will ask for recommendations and will call back. Please advise. Thanks.

## 2020-01-24 NOTE — TELEPHONE ENCOUNTER
Called and spoke to pt daughter Ella informing per Dr. Villa recommendations. Ella stated that she has a hard time getting pt to take medication. Informed Ella to try giving in applesauce or pudding and to give as directed. Informed that we can try pt with Seroquel 25 mg at night.     Ella agreed to start pt with Seroquel 25 and stated verbal understanding with pt Namenda. Informed and provided information for pt and family to contact Jessica Sousa in needed and informed to please call office if have any questions or concerns. Ella stated verbal understanding. Thanks.

## 2020-01-24 NOTE — TELEPHONE ENCOUNTER
Called and spoke to pt daughter Ella informing to hold pt Namenda per Dr. Faustin. Ella stated that she just started pt with Namenda with her first pill this morning. Referred to Ella stating that pt was taking Namenda as prescribed and directed and confirmed that pt was taking which Ella stated that she was confused with medications.     Recommended to have pt seen by PCP to rule out possible UTI. Ella stated that she came to office visit for help for pt and hallucinations and nothing is working. Informed Ella that medication was prescribed for pt to take which will take 1 to 2 weeks to notice a difference. Ella stated that she didn't think medication prescribed would help with hallucinations and stated that pt has taking Xanax in the past and would like for Xanax to be prescribed.     Informed Ella that will inform Dr. Faustin of pt starting Namenda (1 pill) this morning and will call back with recommendation. Ella stated verbal understanding. Please advise. Thanks.

## 2020-01-31 ENCOUNTER — HOSPITAL ENCOUNTER (OUTPATIENT)
Dept: CT IMAGING | Facility: HOSPITAL | Age: 70
Discharge: HOME | End: 2020-01-31
Admitting: PSYCHIATRY & NEUROLOGY

## 2020-01-31 PROCEDURE — 70450 CT HEAD/BRAIN W/O DYE: CPT

## 2020-02-12 ENCOUNTER — TELEPHONE (OUTPATIENT)
Dept: INTERNAL MEDICINE | Facility: CLINIC | Age: 70
End: 2020-02-12

## 2020-02-12 DIAGNOSIS — F41.9 ANXIETY: ICD-10-CM

## 2020-02-12 RX ORDER — BUSPIRONE HYDROCHLORIDE 5 MG/1
TABLET ORAL
Qty: 120 TABLET | Refills: 2 | Status: SHIPPED | OUTPATIENT
Start: 2020-02-12

## 2020-02-12 NOTE — TELEPHONE ENCOUNTER
Day with [facility] is requesting the   busPIRone (BUSPAR) 5 MG tablet  Prescription to be changed up to 4 times a day per the patient's .     Please advise    Pharmacy call back   [phone]

## 2020-02-17 ENCOUNTER — TELEPHONE (OUTPATIENT)
Dept: NEUROLOGY | Facility: CLINIC | Age: 70
End: 2020-02-17

## 2020-02-17 NOTE — TELEPHONE ENCOUNTER
----- Message from Radha Stratton sent at 2/14/2020 10:34 AM EST -----  Contact: Claude pt's   Dr. Faustin,    Claude called in regards to pt's RX memantine (NAMENDA) 10 MG tablet . Claude stated that when he gave the pt 1 1/2 pills in the morning, it caused her face to swell and become flushed. He started giving pt a full pill in the morning and the half pill at night. Claude is wanting to know if it is ok to do it this way or if he should go back to doing them both in the morning? Please call and advise @[phone].

## 2020-02-17 NOTE — TELEPHONE ENCOUNTER
Called pt and LVM regarding message received, informing that it is fine to continue dividing dosages between morning and evening and if have any questions or concerns to please give office a call. Thanks.

## 2020-02-21 ENCOUNTER — OFFICE VISIT (OUTPATIENT)
Dept: INTERNAL MEDICINE | Facility: CLINIC | Age: 70
End: 2020-02-21

## 2020-02-21 VITALS
WEIGHT: 131 LBS | SYSTOLIC BLOOD PRESSURE: 120 MMHG | DIASTOLIC BLOOD PRESSURE: 78 MMHG | HEART RATE: 68 BPM | BODY MASS INDEX: 23.21 KG/M2 | HEIGHT: 63 IN

## 2020-02-21 DIAGNOSIS — F02.80 LATE ONSET ALZHEIMER'S DISEASE WITHOUT BEHAVIORAL DISTURBANCE (HCC): ICD-10-CM

## 2020-02-21 DIAGNOSIS — I10 ESSENTIAL HYPERTENSION: Primary | ICD-10-CM

## 2020-02-21 DIAGNOSIS — G30.1 LATE ONSET ALZHEIMER'S DISEASE WITHOUT BEHAVIORAL DISTURBANCE (HCC): ICD-10-CM

## 2020-02-21 DIAGNOSIS — E55.9 VITAMIN D DEFICIENCY: ICD-10-CM

## 2020-02-21 DIAGNOSIS — E78.2 MIXED HYPERLIPIDEMIA: ICD-10-CM

## 2020-02-21 DIAGNOSIS — K57.30 DIVERTICULOSIS OF LARGE INTESTINE WITHOUT HEMORRHAGE: ICD-10-CM

## 2020-02-21 DIAGNOSIS — G47.09 OTHER INSOMNIA: ICD-10-CM

## 2020-02-21 PROCEDURE — 99214 OFFICE O/P EST MOD 30 MIN: CPT | Performed by: INTERNAL MEDICINE

## 2020-02-21 RX ORDER — TRAZODONE HYDROCHLORIDE 50 MG/1
TABLET ORAL
Qty: 30 TABLET | Refills: 5 | Status: SHIPPED | OUTPATIENT
Start: 2020-02-21 | End: 2021-01-01

## 2020-02-21 NOTE — PROGRESS NOTES
Patient is a 69 y.o. female who is here for a follow up of hyperlipidemia and hypertension.  Chief Complaint   Patient presents with   • Hyperlipidemia   • Hypertension         HPI:    Here for mgmt of dementia and insomnia.  Recently has been using Advil pm , 2 at a time.  Will also add melatonin.  Her memory continues to deteriorate and was recently started on namenda.  Appetite is fair.  No falls.  No wandering.  No nausea or emesis.     History:     Patient Active Problem List   Diagnosis   • Anxiety   • Depression   • Diverticulosis of intestine   • Hyperlipidemia   • Hypertension   • Vitamin D deficiency   • Strain of lumbar paraspinal muscle   • Seasonal allergic rhinitis due to pollen   • Bilateral hearing loss   • Asymptomatic gallstones   • Late onset Alzheimer's disease without behavioral disturbance (CMS/HCC)   • Other insomnia       Past Medical History:   Diagnosis Date   • Anxiety    • Dementia (CMS/HCC)    • Depression    • Hearing loss    • Hyperlipidemia    • Hypertension    • Vertigo        Past Surgical History:   Procedure Laterality Date   • HYSTERECTOMY         Current Outpatient Medications on File Prior to Visit   Medication Sig   • aspirin 81 MG tablet Take 1 tablet by mouth daily.   • busPIRone (BUSPAR) 5 MG tablet Take 1 po bid scheduled, may take 1 q4 prn anxiety   • cycloSPORINE (RESTASIS) 0.05 % ophthalmic emulsion Restasis MultiDose 0.05 % eye drops   • estradiol (ESTRACE) 0.5 MG tablet Take 1 tablet by mouth Daily. (Patient taking differently: Take 0.25 mg by mouth Daily.)   • lisinopril (PRINIVIL,ZESTRIL) 5 MG tablet Take 1 tablet by mouth Every Night.   • memantine (NAMENDA) 10 MG tablet wk1:Take 1/2 tablet(5mg) daily,wk2:Take 1 tablet(10mg) daily,wk3:Take 1 1/2 tablets(15mg) daily,wk4:Take 1 tablet twice daily(10mg BID)   • vitamin D (ERGOCALCIFEROL) 72177 units capsule capsule TAKE 1 CAPSULE BY MOUTH EVERY WEEK   • atorvastatin (LIPITOR) 10 MG tablet atorvastatin 10 mg tablet   •  fluticasone (FLONASE) 50 MCG/ACT nasal spray 2 SPRAYS INTO EACH NOSTRIL DAILY FOR 30 DAYS.   • [DISCONTINUED] traZODone (DESYREL) 50 MG tablet 1/2 to 1 tablet at bed for sleep     No current facility-administered medications on file prior to visit.        Family History   Problem Relation Age of Onset   • Heart disease Father    • Mental illness Sister    • Breast cancer Neg Hx    • Ovarian cancer Neg Hx        Social History     Socioeconomic History   • Marital status:      Spouse name: Not on file   • Number of children: Not on file   • Years of education: Not on file   • Highest education level: Not on file   Tobacco Use   • Smoking status: Never Smoker   • Smokeless tobacco: Never Used   Substance and Sexual Activity   • Alcohol use: Yes     Frequency: Never   • Drug use: No   • Sexual activity: Defer         Review of Systems   Constitutional: Negative for chills, diaphoresis, fever and unexpected weight change.   HENT: Negative for congestion, ear pain, hearing loss, nosebleeds, postnasal drip, sinus pressure and sore throat.    Eyes: Negative for pain, discharge and itching.   Respiratory: Negative for cough, chest tightness, shortness of breath and wheezing.    Cardiovascular: Negative for chest pain, palpitations and leg swelling.   Gastrointestinal: Negative for abdominal distention, abdominal pain, blood in stool, constipation, diarrhea, nausea and vomiting.        10/15 colonoscopy without polyps   Endocrine: Negative for polydipsia and polyuria.   Genitourinary: Negative for difficulty urinating, dysuria, frequency and hematuria.        7/18 mammogram   Musculoskeletal: Positive for arthralgias. Negative for back pain, gait problem, joint swelling and myalgias.   Skin: Negative for rash and wound.   Neurological: Negative for dizziness, syncope, weakness and numbness.        Memory problems   Psychiatric/Behavioral: Positive for decreased concentration and sleep disturbance. The patient is not  "nervous/anxious.        /78   Pulse 68   Ht 160 cm (62.99\")   Wt 59.4 kg (131 lb)   LMP  (LMP Unknown)   BMI 23.21 kg/m²       Physical Exam   Constitutional: She is oriented to person, place, and time. She appears well-developed and well-nourished.   HENT:   Head: Normocephalic and atraumatic.   Right Ear: External ear normal.   Left Ear: External ear normal.   Mouth/Throat: Oropharynx is clear and moist.   Eyes: Conjunctivae and EOM are normal.   Neck: Normal range of motion. Neck supple.   Cardiovascular: Normal rate, regular rhythm and normal heart sounds.   Pulmonary/Chest: Effort normal and breath sounds normal.   Abdominal: Soft. Bowel sounds are normal.   Musculoskeletal: Normal range of motion. She exhibits no tenderness.   Lymphadenopathy:     She has no cervical adenopathy.   Neurological: She is alert and oriented to person, place, and time.   Skin: Skin is warm and dry.   Psychiatric: She has a normal mood and affect. Her behavior is normal. Thought content normal.       Procedure:      Discussion/Summary:    hyperlipidemia-labs 9/16 not at goal, not interested in tx, counseled on diet  htn-stable on lisinopril  vit d def-recheck on 9/6  at goal  Diverticulosis-increase fiber on daily basis  Insomnia-melatonin prn, but restart trazodone, stop Advil pm  Allergic rhinitis-flonase prn, stable  Memory impairment-stable, cont namenda 5 mg      9/6 labs noted and dw patient    Current Outpatient Medications:   •  aspirin 81 MG tablet, Take 1 tablet by mouth daily., Disp: , Rfl:   •  busPIRone (BUSPAR) 5 MG tablet, Take 1 po bid scheduled, may take 1 q4 prn anxiety, Disp: 120 tablet, Rfl: 2  •  cycloSPORINE (RESTASIS) 0.05 % ophthalmic emulsion, Restasis MultiDose 0.05 % eye drops, Disp: , Rfl:   •  estradiol (ESTRACE) 0.5 MG tablet, Take 1 tablet by mouth Daily. (Patient taking differently: Take 0.25 mg by mouth Daily.), Disp: 14 tablet, Rfl: 0  •  lisinopril (PRINIVIL,ZESTRIL) 5 MG tablet, Take 1 " tablet by mouth Every Night., Disp: 90 tablet, Rfl: 3  •  memantine (NAMENDA) 10 MG tablet, wk1:Take 1/2 tablet(5mg) daily,wk2:Take 1 tablet(10mg) daily,wk3:Take 1 1/2 tablets(15mg) daily,wk4:Take 1 tablet twice daily(10mg BID), Disp: 60 tablet, Rfl: 0  •  vitamin D (ERGOCALCIFEROL) 58524 units capsule capsule, TAKE 1 CAPSULE BY MOUTH EVERY WEEK, Disp: 12 capsule, Rfl: 3  •  atorvastatin (LIPITOR) 10 MG tablet, atorvastatin 10 mg tablet, Disp: , Rfl:   •  fluticasone (FLONASE) 50 MCG/ACT nasal spray, 2 SPRAYS INTO EACH NOSTRIL DAILY FOR 30 DAYS., Disp: 1 bottle, Rfl: 5  •  traZODone (DESYREL) 50 MG tablet, 1/2 to 1 tablet at bed for sleep, Disp: 30 tablet, Rfl: 5        Rebecca was seen today for hyperlipidemia and hypertension.    Diagnoses and all orders for this visit:    Essential hypertension    Mixed hyperlipidemia    Vitamin D deficiency    Diverticulosis of large intestine without hemorrhage    Late onset Alzheimer's disease without behavioral disturbance (CMS/HCC)    Other insomnia  -     traZODone (DESYREL) 50 MG tablet; 1/2 to 1 tablet at bed for sleep

## 2020-04-13 ENCOUNTER — TELEPHONE (OUTPATIENT)
Dept: NEUROLOGY | Facility: CLINIC | Age: 70
End: 2020-04-13

## 2020-04-13 NOTE — TELEPHONE ENCOUNTER
PT'S DAUGHTER JELENA  CALLED AND HAD SOME QUESTIONS ABOUT HER MOTHERS LATE STAGE DEMENTIA.  SHE SAID THAT HER MOM COULDN'T REMEMBER HOW TO WALK TODAY BUT WAS CURRENTLY  ASLEEP AND WOULD LIKE SOME CLARIFICATION ON HOW TO HELP HER MOTHER HER BEST CALL BACK NUMBER [phone]

## 2020-04-13 NOTE — TELEPHONE ENCOUNTER
,  Spoke with Ella who states her mom has had a slow and steady decline since they have seen us in January. She has stayed in the house quite a bit and is real hesitant to leave. Will occasionally ride in the car with her dad to the grocery which is about a mile away. In the past week she has not even showered. Will not let daughter help her bathe and says patient keep saying she does not have the energy and repeatedly says she is ready to die. Saw PCP last month which is the last time she has been out and told him these same type of things. She has a difficult time in the evenings with sundowners and all that. Cannot do anything on her own, they provide all meals for her and try to get her to drink well as she seems to do better with some water in her system but she will not even drink more than about 4-6 ozs of water a day and they have tried giving it to her through a straw, making it fun, distracting her which usually gets them at least a couple sips and swallows but again is very difficult.   She woke up this morning walked and out to the kitchen fine, looked out the window above the kitchen sink, gripped it couldn't move or speak and was barely able to walk a couple of steps to the chair with some assistance and they were able to get her to drink a sip of water. She did not eat a bite of toast, nor would she touch her breakfast, barely able to shuffle to her easy chair with assistance from her . A lot more shuffling with a lot of assistance from 2 people to get her to the bathroom.     With the COVID 19 they are weighing the risks of taking her to the hospital versus outpatient care and are hoping for some recommendations that you could offer? As well she is very uncomfortable. What are some things that would help her in the house/ help her to be more comfortable during this time?

## 2020-04-13 NOTE — TELEPHONE ENCOUNTER
These symptoms can be related to disease progression, but I'm surprised to see these symptoms at this time based on her exam in January. Therefore, I would recommend checking with PCP to make sure there are no other medical concerns and see how her visit with Julee goes on Friday before making any additional recommendations. Thanks.

## 2020-04-13 NOTE — TELEPHONE ENCOUNTER
"Email from daughter, Ella, letting me know much of what she called office about earlier with worsening functioning symptoms. Email stated: \"She hasn’t left her house except to ride with my dad a couple of miles from home. She has had trouble walking today. She says she doesn’t have the strength. But, she is eating well and drinking water, so it’s not that she can’t do it because she is weak. It’s like her brain is preventing her from functioning correctly. I think, from reading, this is normal.     She can still swallow, but hates to drink. Every day is a struggle for my dad - he works hard to get her to drink, but she resists.     Yesterday she ate a good Easter meal that I took over. She can’t have visits from us for long. But, she asked for me last week when she began shaking and couldn’t speak. Then, today, the same thing happened and additionally, she couldn’t walk well.     I am reading in the latest [employer] magazine about palliative care. What can you tell me about resources that might be available to my mom and to my dad and I as her caregivers? \"    I emailed her back saying we need to wait Dr. Faustin's medical suggestions first to address any urgent needs but as far a support for family caregiver I gave her a [employer] Resource Guide to look up caregiver agencies, how to pay for them, or adivsed her to call the [facility] on [facility]ging about Medicaid Waiver to pay for services. Also gave her contact for palliative care through [facility] and said Dr. Boswell may have to call since we haven't seen her in several months. Also told her home health could be a good option for PT/OT. I encouarged a video/phone visit with Julee Barnard on Friday so we can further address these issues and make our own referrals if necessary. I will await her response.    "

## 2020-04-14 ENCOUNTER — TELEPHONE (OUTPATIENT)
Dept: NEUROLOGY | Facility: CLINIC | Age: 70
End: 2020-04-14

## 2020-04-14 ENCOUNTER — TELEPHONE (OUTPATIENT)
Dept: INTERNAL MEDICINE | Facility: CLINIC | Age: 70
End: 2020-04-14

## 2020-04-14 NOTE — TELEPHONE ENCOUNTER
I tried calling daughter, Ella, to set this back up, relay Dr. Faustin's message and talk further about her concerns. I left Voicemail.

## 2020-04-14 NOTE — TELEPHONE ENCOUNTER
"Daughter emailed me over night saying \"I guess we will just keep doing what we are doing, not sure what the doctors can do to help\" and that she would contact [facility] herself for palliative care. I tried calling her to talk rather than email and didn't get a VM. I emailed her the following: \"Just left you a Voicemail. I prefer to talk by phone if you can today.     I'm sorry we didn't get you a returned call yesterday as Dr. Faustin didn't respond until office staff left. He wants to rule out any medical issues that could be causing her worsening so we can treat and try to improve symptoms. If there are not underlying medical issues, this is likely disease progression and the supports I mentioned below will help you meet her care needs (caregiver, home health, palliative care).     The reason I asked for you to call Dr. Boswell’ office is because he has seen her most recently (for example, a medical office cannot refer to home health for physical therapy and occupational therapy until they have seen patient within 30 days).   If we can do a video visit with your mom, Julee Barnard can answer and view this from the medical side to make sure we get her the care and services we need and then we can make the referrals and not have to rely on Dr. Boswell to facilitate this.     Hope this info helps.\"     "

## 2020-04-14 NOTE — TELEPHONE ENCOUNTER
Looks like the appt with Julee on Friday was cancelled/refused video visit? Would you like me to call and try and get this set up-do a telephone visit?

## 2020-04-14 NOTE — TELEPHONE ENCOUNTER
Daughter, Ella, emailed me thanking me for my explanation on the benefits of doing a video visit so we can initiate the referrals for home health. explained that Dr. Faustin wanted her ot rule out for medical issues. She said she'll call back about scheduling video visit.

## 2020-04-14 NOTE — TELEPHONE ENCOUNTER
Patient daughter, Geovanna is requesting a call back. States that she has been working with patient's memory doctor in regards to alzheimers. She is wanting some more direction on how to follow up with palliative care and wanted to give the doctor a few updates. Please advise. She can be reached at [phone]. Please advise.     Called patient and left message to return call

## 2020-04-14 NOTE — TELEPHONE ENCOUNTER
Daughter, Ella, emailed me again saying to hold off on making palliative care referral, saying they will do ok alone during this COVID time and that she doesn't feel she has a UTI. I emailed back encouraging her to call if has additional concerns and I see she's reached out to Dr. Boswell' office, too.

## 2020-04-27 ENCOUNTER — TELEPHONE (OUTPATIENT)
Dept: NEUROLOGY | Facility: CLINIC | Age: 70
End: 2020-04-27

## 2020-04-27 ENCOUNTER — TELEMEDICINE (OUTPATIENT)
Dept: NEUROLOGY | Facility: CLINIC | Age: 70
End: 2020-04-27

## 2020-04-27 ENCOUNTER — TELEPHONE (OUTPATIENT)
Dept: INTERNAL MEDICINE | Facility: CLINIC | Age: 70
End: 2020-04-27

## 2020-04-27 DIAGNOSIS — G30.1 LATE ONSET ALZHEIMER'S DISEASE WITHOUT BEHAVIORAL DISTURBANCE (HCC): Primary | ICD-10-CM

## 2020-04-27 DIAGNOSIS — F02.80 LATE ONSET ALZHEIMER'S DISEASE WITHOUT BEHAVIORAL DISTURBANCE (HCC): Primary | ICD-10-CM

## 2020-04-27 PROCEDURE — 99215 OFFICE O/P EST HI 40 MIN: CPT | Performed by: PHYSICIAN ASSISTANT

## 2020-04-27 RX ORDER — MIRTAZAPINE 15 MG/1
15 TABLET, FILM COATED ORAL NIGHTLY
Qty: 30 TABLET | Refills: 11 | Status: SHIPPED | OUTPATIENT
Start: 2020-04-27 | End: 2021-01-01

## 2020-04-27 NOTE — TELEPHONE ENCOUNTER
"Daughter, Ella, emailed me saying they are ready for palliative care. Said she is crying a lot, not understanding how to put one foot in front of the other,  is using a chair to get her from one room to another. They are giving her flexeril a muscle relaxer as \"that's is all I have to ease some of her mental anguish\". Says this stops her crying and relaxes her shoulders and stop crying and take deep breaths. She called palliative care last week and thought they were helpful but not ready to use them, said she will call today about starting. Asked if I had any other advice. I told her that palliative care could be weeks out on response time and if so we can set up visit to evaluate her for any recommendations.   " None

## 2020-04-27 NOTE — TELEPHONE ENCOUNTER
PHI FROM [facility] CALLED WOULD LIKE TO KNOW IF  CAN REMAIN PCP IF THEY ADMIT PT TO HOSPICE.    PLEASE ADVISE.  CALL BACK:[phone]     Usually have their MD follow

## 2020-04-27 NOTE — TELEPHONE ENCOUNTER
Daughter left me a VM about the meds Julee prescribed and if she should also be taking meds that Julee said Dr. Faustin prescribed (but never took them). Can you all provide any insight and clarification to daughterMaggie? thanks

## 2020-04-27 NOTE — TELEPHONE ENCOUNTER
SHITAL WITH  [facility] WOULD LIKE TO KNOW IF DR. COX WOULD BE THE ATTENDING  PHYSICIAN FOR THE PATIENT    SHITAL CALL BACK [phone]

## 2020-04-27 NOTE — TELEPHONE ENCOUNTER
"Daughter called saying she has an emergency with her mom, her mom is having one of her usual panic attacks and hyperventilating. Said she wouldn't take her usual med (muscle relaxer) and wanted advice, told her to make room calm, try breathing techniques and they've done all that. I told her to call EMS due to urgency of her needing help as we couldn't get a visit scheduled or call in an rx in timely manner as she wanted help now. Verbalized understanding to call EMS even though \"they weren't helpful last time\".  I spoke with Julee Barnard who agreed to see her or start a med, daughter had a lot of questions about med she is on now, what meds we would give her and symptoms so Julee agreed to see her at 1230pm today via [employer].  "

## 2020-04-27 NOTE — PROGRESS NOTES
"Subjective     Chief Complaint: memory impairment      History of Present Illness   Rebecca Terrazas is a 69 y.o. female who returns to clinic today for evaluation of memory impairment. Her family has noted symptoms since approximately 2013 marked initially by forgetfulness. This has gradually worsened  over time. Additional associated symptoms have included impairments in orientation , language  and executive function. She denies current anxiety or depression, though she has a history of a \"nervous breakdown\" in about 2008 per family.     Prior evaluation has included screening blood work  which was unremarkable . She was intolerant of donepezil due to diarrhea, though her  thought this medication was helpful.     Today: Since her last visit in 1/20, her family has noted a gradual, but significant cognitive and physical decline. Her anxiety has significantly improved.       I have reviewed and confirmed the past family, social and medical history as accurate on 4/27/20.     Review of Systems   Constitutional: Negative.    HENT: Negative.    Eyes: Negative.    Respiratory: Negative.    Cardiovascular: Negative.    Gastrointestinal: Negative.    Endocrine: Negative.    Genitourinary: Negative.    Musculoskeletal: Negative.    Skin: Negative.    Allergic/Immunologic: Negative.    Hematological: Negative.        Objective     LMP  (LMP Unknown)     General appearance today is normal.       Physical Exam     Neurologic Exam     Mental Status   Level of consciousness: alert  Normal comprehension.         Results  MMSE= 20 in 1/20       Assessment/Plan   Diagnoses and all orders for this visit:    Late onset Alzheimer's disease without behavioral disturbance (CMS/HCC)    Other orders  -     mirtazapine (REMERON) 15 MG tablet; Take 1 tablet by mouth Every Night.          Discussion/Summary   Rebecca Terrazas returns to clinic today for evaluation of Alzheimer's Disease . After discussing potential treatment options, it " was elected to add  mirtazapine and continue on memantine unchanged. She will then follow up in 1 month , or sooner if needed.   I spent 40 minutes face to face with the patient and family with 25 minutes spent on discussing diagnosis, prognosis, evaluation, current status, treatment options and management as discussed above.     This visit was performed via [employer] Video chat with patient's consent.     As part of this visit I obtained additional history from the family which is incorporated in the HPI.      Julee Barnard PA-C

## 2020-04-28 NOTE — TELEPHONE ENCOUNTER
Pt is informed.  She states that we can disregard her message.  Hospice did come in yesterday and added some medications that seem to be working well.   She will let us know if she needs us.

## 2020-04-28 NOTE — TELEPHONE ENCOUNTER
Jessica,     Would you care to clarify which medication the daughter means? Maybe seroquel? If so, I would just recommend starting the mirtazapine right now. Thanks?

## 2021-01-01 ENCOUNTER — APPOINTMENT (OUTPATIENT)
Dept: CT IMAGING | Facility: HOSPITAL | Age: 71
End: 2021-01-01

## 2021-01-01 ENCOUNTER — HOSPITAL ENCOUNTER (INPATIENT)
Facility: HOSPITAL | Age: 71
LOS: 1 days | End: 2021-08-05
Attending: INTERNAL MEDICINE | Admitting: INTERNAL MEDICINE

## 2021-01-01 ENCOUNTER — HOSPITAL ENCOUNTER (OUTPATIENT)
Facility: HOSPITAL | Age: 71
Setting detail: OBSERVATION
End: 2021-08-04
Attending: EMERGENCY MEDICINE | Admitting: INTERNAL MEDICINE

## 2021-01-01 ENCOUNTER — APPOINTMENT (OUTPATIENT)
Dept: GENERAL RADIOLOGY | Facility: HOSPITAL | Age: 71
End: 2021-01-01

## 2021-01-01 VITALS
RESPIRATION RATE: 22 BRPM | HEIGHT: 63 IN | BODY MASS INDEX: 23.18 KG/M2 | TEMPERATURE: 98.8 F | OXYGEN SATURATION: 95 % | WEIGHT: 130.8 LBS | HEART RATE: 111 BPM | SYSTOLIC BLOOD PRESSURE: 173 MMHG | DIASTOLIC BLOOD PRESSURE: 85 MMHG

## 2021-01-01 DIAGNOSIS — G93.40 ACUTE ENCEPHALOPATHY: Primary | ICD-10-CM

## 2021-01-01 DIAGNOSIS — D72.829 LEUKOCYTOSIS, UNSPECIFIED TYPE: ICD-10-CM

## 2021-01-01 DIAGNOSIS — R70.0 ELEVATED SED RATE: ICD-10-CM

## 2021-01-01 DIAGNOSIS — R33.8 ACUTE URINARY RETENTION: ICD-10-CM

## 2021-01-01 DIAGNOSIS — R79.89 ELEVATED TSH: ICD-10-CM

## 2021-01-01 LAB
ALBUMIN SERPL-MCNC: 4 G/DL (ref 3.5–5.2)
ALBUMIN/GLOB SERPL: 1.1 G/DL
ALP SERPL-CCNC: 151 U/L (ref 39–117)
ALT SERPL W P-5'-P-CCNC: 9 U/L (ref 1–33)
AMMONIA BLD-SCNC: 24 UMOL/L (ref 11–51)
AMPHET+METHAMPHET UR QL: NEGATIVE
AMPHETAMINES UR QL: NEGATIVE
ANION GAP SERPL CALCULATED.3IONS-SCNC: 11 MMOL/L (ref 5–15)
ANION GAP SERPL CALCULATED.3IONS-SCNC: 15 MMOL/L (ref 5–15)
APAP SERPL-MCNC: <5 MCG/ML (ref 0–30)
AST SERPL-CCNC: 22 U/L (ref 1–32)
BARBITURATES UR QL SCN: NEGATIVE
BASOPHILS # BLD AUTO: 0.07 10*3/MM3 (ref 0–0.2)
BASOPHILS NFR BLD AUTO: 0.5 % (ref 0–1.5)
BENZODIAZ UR QL SCN: NEGATIVE
BILIRUB SERPL-MCNC: 0.6 MG/DL (ref 0–1.2)
BILIRUB UR QL STRIP: NEGATIVE
BUN SERPL-MCNC: 8 MG/DL (ref 8–23)
BUN SERPL-MCNC: 8 MG/DL (ref 8–23)
BUN/CREAT SERPL: 10.5 (ref 7–25)
BUN/CREAT SERPL: 9.1 (ref 7–25)
BUPRENORPHINE SERPL-MCNC: NEGATIVE NG/ML
CALCIUM SPEC-SCNC: 8.9 MG/DL (ref 8.6–10.5)
CALCIUM SPEC-SCNC: 9.1 MG/DL (ref 8.6–10.5)
CANNABINOIDS SERPL QL: NEGATIVE
CHLORIDE SERPL-SCNC: 107 MMOL/L (ref 98–107)
CHLORIDE SERPL-SCNC: 107 MMOL/L (ref 98–107)
CLARITY UR: CLEAR
CO2 SERPL-SCNC: 20 MMOL/L (ref 22–29)
CO2 SERPL-SCNC: 21 MMOL/L (ref 22–29)
COCAINE UR QL: NEGATIVE
COLOR UR: YELLOW
CREAT SERPL-MCNC: 0.76 MG/DL (ref 0.57–1)
CREAT SERPL-MCNC: 0.88 MG/DL (ref 0.57–1)
DEPRECATED RDW RBC AUTO: 40.8 FL (ref 37–54)
DEPRECATED RDW RBC AUTO: 44 FL (ref 37–54)
EOSINOPHIL # BLD AUTO: 0.04 10*3/MM3 (ref 0–0.4)
EOSINOPHIL NFR BLD AUTO: 0.3 % (ref 0.3–6.2)
ERYTHROCYTE [DISTWIDTH] IN BLOOD BY AUTOMATED COUNT: 12.4 % (ref 12.3–15.4)
ERYTHROCYTE [DISTWIDTH] IN BLOOD BY AUTOMATED COUNT: 12.6 % (ref 12.3–15.4)
ERYTHROCYTE [SEDIMENTATION RATE] IN BLOOD: 38 MM/HR (ref 0–30)
ETHANOL BLD-MCNC: <10 MG/DL (ref 0–10)
FLUAV SUBTYP SPEC NAA+PROBE: NOT DETECTED
FLUBV RNA ISLT QL NAA+PROBE: NOT DETECTED
GFR SERPL CREATININE-BSD FRML MDRD: 63 ML/MIN/1.73
GFR SERPL CREATININE-BSD FRML MDRD: 75 ML/MIN/1.73
GLOBULIN UR ELPH-MCNC: 3.6 GM/DL
GLUCOSE SERPL-MCNC: 114 MG/DL (ref 65–99)
GLUCOSE SERPL-MCNC: 87 MG/DL (ref 65–99)
GLUCOSE UR STRIP-MCNC: NEGATIVE MG/DL
HCT VFR BLD AUTO: 42 % (ref 34–46.6)
HCT VFR BLD AUTO: 42.3 % (ref 34–46.6)
HGB BLD-MCNC: 13.4 G/DL (ref 12–15.9)
HGB BLD-MCNC: 14.4 G/DL (ref 12–15.9)
HGB UR QL STRIP.AUTO: NEGATIVE
HOLD SPECIMEN: NORMAL
IMM GRANULOCYTES # BLD AUTO: 0.05 10*3/MM3 (ref 0–0.05)
IMM GRANULOCYTES NFR BLD AUTO: 0.4 % (ref 0–0.5)
KETONES UR QL STRIP: ABNORMAL
LEUKOCYTE ESTERASE UR QL STRIP.AUTO: NEGATIVE
LYMPHOCYTES # BLD AUTO: 1.18 10*3/MM3 (ref 0.7–3.1)
LYMPHOCYTES NFR BLD AUTO: 8.7 % (ref 19.6–45.3)
MAGNESIUM SERPL-MCNC: 1.8 MG/DL (ref 1.6–2.4)
MCH RBC QN AUTO: 30.2 PG (ref 26.6–33)
MCH RBC QN AUTO: 30.5 PG (ref 26.6–33)
MCHC RBC AUTO-ENTMCNC: 31.9 G/DL (ref 31.5–35.7)
MCHC RBC AUTO-ENTMCNC: 34 G/DL (ref 31.5–35.7)
MCV RBC AUTO: 89.6 FL (ref 79–97)
MCV RBC AUTO: 94.8 FL (ref 79–97)
METHADONE UR QL SCN: NEGATIVE
MONOCYTES # BLD AUTO: 1.2 10*3/MM3 (ref 0.1–0.9)
MONOCYTES NFR BLD AUTO: 8.9 % (ref 5–12)
NEUTROPHILS NFR BLD AUTO: 10.95 10*3/MM3 (ref 1.7–7)
NEUTROPHILS NFR BLD AUTO: 81.2 % (ref 42.7–76)
NITRITE UR QL STRIP: NEGATIVE
NRBC BLD AUTO-RTO: 0 /100 WBC (ref 0–0.2)
OPIATES UR QL: NEGATIVE
OXYCODONE UR QL SCN: NEGATIVE
PCP UR QL SCN: NEGATIVE
PH UR STRIP.AUTO: 6.5 [PH] (ref 5–8)
PLATELET # BLD AUTO: 266 10*3/MM3 (ref 140–450)
PLATELET # BLD AUTO: 318 10*3/MM3 (ref 140–450)
PMV BLD AUTO: 9.7 FL (ref 6–12)
PMV BLD AUTO: 9.7 FL (ref 6–12)
POTASSIUM SERPL-SCNC: 3.4 MMOL/L (ref 3.5–5.2)
POTASSIUM SERPL-SCNC: 3.4 MMOL/L (ref 3.5–5.2)
POTASSIUM SERPL-SCNC: 3.8 MMOL/L (ref 3.5–5.2)
PROCALCITONIN SERPL-MCNC: 0.07 NG/ML (ref 0–0.25)
PROPOXYPH UR QL: NEGATIVE
PROT SERPL-MCNC: 7.6 G/DL (ref 6–8.5)
PROT UR QL STRIP: NEGATIVE
QT INTERVAL: 338 MS
QTC INTERVAL: 429 MS
RBC # BLD AUTO: 4.43 10*6/MM3 (ref 3.77–5.28)
RBC # BLD AUTO: 4.72 10*6/MM3 (ref 3.77–5.28)
SALICYLATES SERPL-MCNC: <0.3 MG/DL
SARS-COV-2 RNA PNL SPEC NAA+PROBE: NOT DETECTED
SODIUM SERPL-SCNC: 139 MMOL/L (ref 136–145)
SODIUM SERPL-SCNC: 142 MMOL/L (ref 136–145)
SP GR UR STRIP: 1.01 (ref 1–1.03)
TRICYCLICS UR QL SCN: POSITIVE
TROPONIN T SERPL-MCNC: 0.02 NG/ML (ref 0–0.03)
TSH SERPL DL<=0.05 MIU/L-ACNC: 4.47 UIU/ML (ref 0.27–4.2)
UROBILINOGEN UR QL STRIP: ABNORMAL
WBC # BLD AUTO: 13.49 10*3/MM3 (ref 3.4–10.8)
WBC # BLD AUTO: 9.17 10*3/MM3 (ref 3.4–10.8)
WHOLE BLOOD HOLD SPECIMEN: NORMAL

## 2021-01-01 PROCEDURE — 99285 EMERGENCY DEPT VISIT HI MDM: CPT

## 2021-01-01 PROCEDURE — 25010000002 LORAZEPAM PER 2 MG: Performed by: INTERNAL MEDICINE

## 2021-01-01 PROCEDURE — 25010000002 MORPHINE PER 10 MG: Performed by: NURSE PRACTITIONER

## 2021-01-01 PROCEDURE — C9803 HOPD COVID-19 SPEC COLLECT: HCPCS

## 2021-01-01 PROCEDURE — 25010000002 FUROSEMIDE PER 20 MG: Performed by: NURSE PRACTITIONER

## 2021-01-01 PROCEDURE — 96375 TX/PRO/DX INJ NEW DRUG ADDON: CPT

## 2021-01-01 PROCEDURE — 85027 COMPLETE CBC AUTOMATED: CPT | Performed by: HOSPITALIST

## 2021-01-01 PROCEDURE — 84443 ASSAY THYROID STIM HORMONE: CPT | Performed by: EMERGENCY MEDICINE

## 2021-01-01 PROCEDURE — G0378 HOSPITAL OBSERVATION PER HR: HCPCS

## 2021-01-01 PROCEDURE — 99220 PR INITIAL OBSERVATION CARE/DAY 70 MINUTES: CPT | Performed by: HOSPITALIST

## 2021-01-01 PROCEDURE — 51701 INSERT BLADDER CATHETER: CPT

## 2021-01-01 PROCEDURE — 51798 US URINE CAPACITY MEASURE: CPT

## 2021-01-01 PROCEDURE — 25010000002 KETOROLAC TROMETHAMINE PER 15 MG: Performed by: NURSE PRACTITIONER

## 2021-01-01 PROCEDURE — 84132 ASSAY OF SERUM POTASSIUM: CPT | Performed by: HOSPITALIST

## 2021-01-01 PROCEDURE — 80048 BASIC METABOLIC PNL TOTAL CA: CPT | Performed by: HOSPITALIST

## 2021-01-01 PROCEDURE — 87636 SARSCOV2 & INF A&B AMP PRB: CPT | Performed by: EMERGENCY MEDICINE

## 2021-01-01 PROCEDURE — 96365 THER/PROPH/DIAG IV INF INIT: CPT

## 2021-01-01 PROCEDURE — P9612 CATHETERIZE FOR URINE SPEC: HCPCS

## 2021-01-01 PROCEDURE — 82140 ASSAY OF AMMONIA: CPT | Performed by: EMERGENCY MEDICINE

## 2021-01-01 PROCEDURE — 25010000002 HYDROMORPHONE PER 4 MG: Performed by: NURSE PRACTITIONER

## 2021-01-01 PROCEDURE — 25010000002 PHENOBARBITAL PER 120 MG: Performed by: NURSE PRACTITIONER

## 2021-01-01 PROCEDURE — 96361 HYDRATE IV INFUSION ADD-ON: CPT

## 2021-01-01 PROCEDURE — 51702 INSERT TEMP BLADDER CATH: CPT

## 2021-01-01 PROCEDURE — 25010000002 HYDROMORPHONE 1 MG/ML SOLUTION: Performed by: NURSE PRACTITIONER

## 2021-01-01 PROCEDURE — 74176 CT ABD & PELVIS W/O CONTRAST: CPT

## 2021-01-01 PROCEDURE — 96376 TX/PRO/DX INJ SAME DRUG ADON: CPT

## 2021-01-01 PROCEDURE — 25010000002 MORPHINE PER 10 MG: Performed by: INTERNAL MEDICINE

## 2021-01-01 PROCEDURE — 25010000002 LORAZEPAM PER 2 MG: Performed by: NURSE PRACTITIONER

## 2021-01-01 PROCEDURE — 85025 COMPLETE CBC W/AUTO DIFF WBC: CPT | Performed by: EMERGENCY MEDICINE

## 2021-01-01 PROCEDURE — 82077 ASSAY SPEC XCP UR&BREATH IA: CPT | Performed by: EMERGENCY MEDICINE

## 2021-01-01 PROCEDURE — 25010000002 THIAMINE PER 100 MG: Performed by: EMERGENCY MEDICINE

## 2021-01-01 PROCEDURE — 25010000002 LORAZEPAM PER 2 MG: Performed by: HOSPITALIST

## 2021-01-01 PROCEDURE — 25010000002 LORAZEPAM PER 2 MG

## 2021-01-01 PROCEDURE — 96366 THER/PROPH/DIAG IV INF ADDON: CPT

## 2021-01-01 PROCEDURE — 80143 DRUG ASSAY ACETAMINOPHEN: CPT | Performed by: EMERGENCY MEDICINE

## 2021-01-01 PROCEDURE — 25010000002 ENOXAPARIN PER 10 MG: Performed by: HOSPITALIST

## 2021-01-01 PROCEDURE — 99225 PR SBSQ OBSERVATION CARE/DAY 25 MINUTES: CPT | Performed by: INTERNAL MEDICINE

## 2021-01-01 PROCEDURE — 71045 X-RAY EXAM CHEST 1 VIEW: CPT

## 2021-01-01 PROCEDURE — 84145 PROCALCITONIN (PCT): CPT | Performed by: EMERGENCY MEDICINE

## 2021-01-01 PROCEDURE — 83735 ASSAY OF MAGNESIUM: CPT | Performed by: EMERGENCY MEDICINE

## 2021-01-01 PROCEDURE — 25010000002 LORAZEPAM PER 2 MG: Performed by: EMERGENCY MEDICINE

## 2021-01-01 PROCEDURE — 25010000002 HALOPERIDOL LACTATE PER 5 MG: Performed by: NURSE PRACTITIONER

## 2021-01-01 PROCEDURE — 80306 DRUG TEST PRSMV INSTRMNT: CPT | Performed by: EMERGENCY MEDICINE

## 2021-01-01 PROCEDURE — 96372 THER/PROPH/DIAG INJ SC/IM: CPT

## 2021-01-01 PROCEDURE — 84484 ASSAY OF TROPONIN QUANT: CPT | Performed by: EMERGENCY MEDICINE

## 2021-01-01 PROCEDURE — 80179 DRUG ASSAY SALICYLATE: CPT | Performed by: EMERGENCY MEDICINE

## 2021-01-01 PROCEDURE — 25010000002 ZIPRASIDONE MESYLATE PER 10 MG: Performed by: EMERGENCY MEDICINE

## 2021-01-01 PROCEDURE — 81003 URINALYSIS AUTO W/O SCOPE: CPT | Performed by: EMERGENCY MEDICINE

## 2021-01-01 PROCEDURE — 85652 RBC SED RATE AUTOMATED: CPT | Performed by: EMERGENCY MEDICINE

## 2021-01-01 PROCEDURE — 70450 CT HEAD/BRAIN W/O DYE: CPT

## 2021-01-01 PROCEDURE — 80053 COMPREHEN METABOLIC PANEL: CPT | Performed by: EMERGENCY MEDICINE

## 2021-01-01 PROCEDURE — 93005 ELECTROCARDIOGRAM TRACING: CPT | Performed by: EMERGENCY MEDICINE

## 2021-01-01 RX ORDER — LORAZEPAM 2 MG/ML
INJECTION INTRAMUSCULAR
Status: COMPLETED
Start: 2021-01-01 | End: 2021-01-01

## 2021-01-01 RX ORDER — MIRTAZAPINE 30 MG/1
30 TABLET, FILM COATED ORAL NIGHTLY
COMMUNITY

## 2021-01-01 RX ORDER — MULTIPLE VITAMINS W/ MINERALS TAB 9MG-400MCG
1 TAB ORAL DAILY
COMMUNITY

## 2021-01-01 RX ORDER — LORAZEPAM 2 MG/ML
0.5 INJECTION INTRAMUSCULAR EVERY 4 HOURS
Status: DISCONTINUED | OUTPATIENT
Start: 2021-01-01 | End: 2021-01-01

## 2021-01-01 RX ORDER — BISACODYL 10 MG
10 SUPPOSITORY, RECTAL RECTAL DAILY
Status: DISCONTINUED | OUTPATIENT
Start: 2021-01-01 | End: 2021-01-01 | Stop reason: HOSPADM

## 2021-01-01 RX ORDER — QUETIAPINE FUMARATE 25 MG/1
25 TABLET, FILM COATED ORAL DAILY
Status: CANCELLED | OUTPATIENT
Start: 2021-01-01

## 2021-01-01 RX ORDER — GLYCOPYRROLATE 0.2 MG/ML
0.2 INJECTION INTRAMUSCULAR; INTRAVENOUS EVERY 6 HOURS PRN
Status: DISCONTINUED | OUTPATIENT
Start: 2021-01-01 | End: 2021-01-01

## 2021-01-01 RX ORDER — LIDOCAINE 40 MG/G
CREAM TOPICAL AS NEEDED
Status: CANCELLED | OUTPATIENT
Start: 2021-01-01

## 2021-01-01 RX ORDER — LORAZEPAM 2 MG/ML
0.5 INJECTION INTRAMUSCULAR ONCE
Status: COMPLETED | OUTPATIENT
Start: 2021-01-01 | End: 2021-01-01

## 2021-01-01 RX ORDER — PROMETHAZINE HYDROCHLORIDE 25 MG/1
25 SUPPOSITORY RECTAL EVERY 4 HOURS PRN
Status: DISCONTINUED | OUTPATIENT
Start: 2021-01-01 | End: 2021-01-01 | Stop reason: HOSPADM

## 2021-01-01 RX ORDER — KETOROLAC TROMETHAMINE 15 MG/ML
10 INJECTION, SOLUTION INTRAMUSCULAR; INTRAVENOUS EVERY 6 HOURS PRN
Status: DISCONTINUED | OUTPATIENT
Start: 2021-01-01 | End: 2021-01-01 | Stop reason: HOSPADM

## 2021-01-01 RX ORDER — HALOPERIDOL 5 MG/ML
1 INJECTION INTRAMUSCULAR EVERY 6 HOURS PRN
Status: DISCONTINUED | OUTPATIENT
Start: 2021-01-01 | End: 2021-01-01 | Stop reason: HOSPADM

## 2021-01-01 RX ORDER — FUROSEMIDE 10 MG/ML
20 INJECTION INTRAMUSCULAR; INTRAVENOUS EVERY 6 HOURS PRN
Status: DISCONTINUED | OUTPATIENT
Start: 2021-01-01 | End: 2021-01-01 | Stop reason: HOSPADM

## 2021-01-01 RX ORDER — SODIUM CHLORIDE 0.9 % (FLUSH) 0.9 %
10 SYRINGE (ML) INJECTION EVERY 12 HOURS SCHEDULED
Status: DISCONTINUED | OUTPATIENT
Start: 2021-01-01 | End: 2021-01-01 | Stop reason: HOSPADM

## 2021-01-01 RX ORDER — QUETIAPINE FUMARATE 25 MG/1
25 TABLET, FILM COATED ORAL DAILY
Status: DISCONTINUED | OUTPATIENT
Start: 2021-01-01 | End: 2021-01-01 | Stop reason: HOSPADM

## 2021-01-01 RX ORDER — LORAZEPAM 2 MG/ML
0.5 INJECTION INTRAMUSCULAR EVERY 4 HOURS PRN
Status: CANCELLED | OUTPATIENT
Start: 2021-01-01 | End: 2021-08-10

## 2021-01-01 RX ORDER — ACETAMINOPHEN 650 MG/1
650 SUPPOSITORY RECTAL EVERY 4 HOURS PRN
Status: DISCONTINUED | OUTPATIENT
Start: 2021-01-01 | End: 2021-01-01 | Stop reason: HOSPADM

## 2021-01-01 RX ORDER — MORPHINE SULFATE 20 MG/ML
2 SOLUTION ORAL EVERY 4 HOURS PRN
Status: CANCELLED | OUTPATIENT
Start: 2021-01-01 | End: 2021-08-10

## 2021-01-01 RX ORDER — POTASSIUM CHLORIDE 1.5 G/1.77G
40 POWDER, FOR SOLUTION ORAL AS NEEDED
Status: DISCONTINUED | OUTPATIENT
Start: 2021-01-01 | End: 2021-01-01

## 2021-01-01 RX ORDER — TAMSULOSIN HYDROCHLORIDE 0.4 MG/1
0.4 CAPSULE ORAL DAILY
Status: DISCONTINUED | OUTPATIENT
Start: 2021-01-01 | End: 2021-01-01 | Stop reason: HOSPADM

## 2021-01-01 RX ORDER — LORAZEPAM 2 MG/ML
0.25 INJECTION INTRAMUSCULAR EVERY 6 HOURS PRN
Status: DISCONTINUED | OUTPATIENT
Start: 2021-01-01 | End: 2021-01-01

## 2021-01-01 RX ORDER — WATER 1000 ML/1000ML
INJECTION, SOLUTION INTRAVENOUS
Status: COMPLETED
Start: 2021-01-01 | End: 2021-01-01

## 2021-01-01 RX ORDER — BISACODYL 10 MG
10 SUPPOSITORY, RECTAL RECTAL DAILY PRN
Status: CANCELLED | OUTPATIENT
Start: 2021-01-01

## 2021-01-01 RX ORDER — HYDROMORPHONE HYDROCHLORIDE 1 MG/ML
0.5 INJECTION, SOLUTION INTRAMUSCULAR; INTRAVENOUS; SUBCUTANEOUS EVERY 4 HOURS
Status: DISCONTINUED | OUTPATIENT
Start: 2021-01-01 | End: 2021-01-01

## 2021-01-01 RX ORDER — HALOPERIDOL 5 MG/ML
1 INJECTION INTRAMUSCULAR EVERY 6 HOURS PRN
Status: CANCELLED | OUTPATIENT
Start: 2021-01-01

## 2021-01-01 RX ORDER — QUETIAPINE FUMARATE 25 MG/1
25 TABLET, FILM COATED ORAL NIGHTLY
COMMUNITY

## 2021-01-01 RX ORDER — SODIUM CHLORIDE 0.9 % (FLUSH) 0.9 %
10 SYRINGE (ML) INJECTION AS NEEDED
Status: DISCONTINUED | OUTPATIENT
Start: 2021-01-01 | End: 2021-01-01 | Stop reason: HOSPADM

## 2021-01-01 RX ORDER — BETHANECHOL CHLORIDE 10 MG/1
10 TABLET ORAL 3 TIMES DAILY
Status: CANCELLED | OUTPATIENT
Start: 2021-01-01

## 2021-01-01 RX ORDER — ONDANSETRON 2 MG/ML
4 INJECTION INTRAMUSCULAR; INTRAVENOUS EVERY 6 HOURS PRN
Status: DISCONTINUED | OUTPATIENT
Start: 2021-01-01 | End: 2021-01-01 | Stop reason: HOSPADM

## 2021-01-01 RX ORDER — LORAZEPAM 2 MG/ML
0.5 INJECTION INTRAMUSCULAR EVERY 4 HOURS PRN
Status: DISCONTINUED | OUTPATIENT
Start: 2021-01-01 | End: 2021-01-01 | Stop reason: HOSPADM

## 2021-01-01 RX ORDER — BISACODYL 10 MG
10 SUPPOSITORY, RECTAL RECTAL DAILY
Status: CANCELLED | OUTPATIENT
Start: 2021-01-01 | End: 2021-08-07

## 2021-01-01 RX ORDER — SODIUM CHLORIDE 0.9 % (FLUSH) 0.9 %
10 SYRINGE (ML) INJECTION EVERY 12 HOURS SCHEDULED
Status: CANCELLED | OUTPATIENT
Start: 2021-01-01

## 2021-01-01 RX ORDER — QUETIAPINE FUMARATE 25 MG/1
50 TABLET, FILM COATED ORAL NIGHTLY
Status: CANCELLED | OUTPATIENT
Start: 2021-01-01

## 2021-01-01 RX ORDER — SCOLOPAMINE TRANSDERMAL SYSTEM 1 MG/1
1 PATCH, EXTENDED RELEASE TRANSDERMAL
Status: DISCONTINUED | OUTPATIENT
Start: 2021-01-01 | End: 2021-01-01 | Stop reason: HOSPADM

## 2021-01-01 RX ORDER — ONDANSETRON 4 MG/1
4 TABLET, FILM COATED ORAL EVERY 6 HOURS PRN
Status: DISCONTINUED | OUTPATIENT
Start: 2021-01-01 | End: 2021-01-01 | Stop reason: HOSPADM

## 2021-01-01 RX ORDER — LIDOCAINE 40 MG/G
CREAM TOPICAL AS NEEDED
Status: DISCONTINUED | OUTPATIENT
Start: 2021-01-01 | End: 2021-01-01 | Stop reason: HOSPADM

## 2021-01-01 RX ORDER — BISACODYL 10 MG
10 SUPPOSITORY, RECTAL RECTAL ONCE
Status: COMPLETED | OUTPATIENT
Start: 2021-01-01 | End: 2021-01-01

## 2021-01-01 RX ORDER — GLYCOPYRROLATE 0.2 MG/ML
0.2 INJECTION INTRAMUSCULAR; INTRAVENOUS EVERY 4 HOURS PRN
Status: DISCONTINUED | OUTPATIENT
Start: 2021-01-01 | End: 2021-01-01 | Stop reason: HOSPADM

## 2021-01-01 RX ORDER — BISACODYL 10 MG
10 SUPPOSITORY, RECTAL RECTAL DAILY PRN
Status: DISCONTINUED | OUTPATIENT
Start: 2021-01-01 | End: 2021-01-01 | Stop reason: HOSPADM

## 2021-01-01 RX ORDER — QUETIAPINE FUMARATE 25 MG/1
50 TABLET, FILM COATED ORAL NIGHTLY
Status: DISCONTINUED | OUTPATIENT
Start: 2021-01-01 | End: 2021-01-01 | Stop reason: HOSPADM

## 2021-01-01 RX ORDER — DIPHENHYDRAMINE HYDROCHLORIDE 50 MG/ML
50 INJECTION INTRAMUSCULAR; INTRAVENOUS EVERY 6 HOURS PRN
Status: DISCONTINUED | OUTPATIENT
Start: 2021-01-01 | End: 2021-01-01 | Stop reason: HOSPADM

## 2021-01-01 RX ORDER — POTASSIUM CHLORIDE 7.45 MG/ML
10 INJECTION INTRAVENOUS
Status: DISCONTINUED | OUTPATIENT
Start: 2021-01-01 | End: 2021-01-01

## 2021-01-01 RX ORDER — BUSPIRONE HYDROCHLORIDE 5 MG/1
5 TABLET ORAL 3 TIMES DAILY
Status: CANCELLED | OUTPATIENT
Start: 2021-01-01

## 2021-01-01 RX ORDER — BUSPIRONE HYDROCHLORIDE 5 MG/1
5 TABLET ORAL 3 TIMES DAILY
Status: DISCONTINUED | OUTPATIENT
Start: 2021-01-01 | End: 2021-01-01 | Stop reason: HOSPADM

## 2021-01-01 RX ORDER — LORAZEPAM 2 MG/ML
0.5 INJECTION INTRAMUSCULAR EVERY 6 HOURS
Status: DISCONTINUED | OUTPATIENT
Start: 2021-01-01 | End: 2021-01-01 | Stop reason: HOSPADM

## 2021-01-01 RX ORDER — ZIPRASIDONE MESYLATE 20 MG/ML
10 INJECTION, POWDER, LYOPHILIZED, FOR SOLUTION INTRAMUSCULAR ONCE
Status: COMPLETED | OUTPATIENT
Start: 2021-01-01 | End: 2021-01-01

## 2021-01-01 RX ORDER — SODIUM CHLORIDE, SODIUM LACTATE, POTASSIUM CHLORIDE, CALCIUM CHLORIDE 600; 310; 30; 20 MG/100ML; MG/100ML; MG/100ML; MG/100ML
100 INJECTION, SOLUTION INTRAVENOUS CONTINUOUS
Status: ACTIVE | OUTPATIENT
Start: 2021-01-01 | End: 2021-01-01

## 2021-01-01 RX ORDER — LORAZEPAM 2 MG/ML
0.5 INJECTION INTRAMUSCULAR EVERY 6 HOURS
Status: CANCELLED | OUTPATIENT
Start: 2021-01-01 | End: 2021-08-14

## 2021-01-01 RX ORDER — LORAZEPAM 2 MG/ML
1 INJECTION INTRAMUSCULAR EVERY 4 HOURS PRN
Status: DISCONTINUED | OUTPATIENT
Start: 2021-01-01 | End: 2021-01-01

## 2021-01-01 RX ORDER — HALOPERIDOL 5 MG/ML
5 INJECTION INTRAMUSCULAR EVERY 4 HOURS PRN
Status: DISCONTINUED | OUTPATIENT
Start: 2021-01-01 | End: 2021-01-01 | Stop reason: HOSPADM

## 2021-01-01 RX ORDER — FUROSEMIDE 10 MG/ML
20 INJECTION INTRAMUSCULAR; INTRAVENOUS EVERY 8 HOURS
Status: DISCONTINUED | OUTPATIENT
Start: 2021-01-01 | End: 2021-01-01 | Stop reason: HOSPADM

## 2021-01-01 RX ORDER — ACETAMINOPHEN 650 MG/1
650 SUPPOSITORY RECTAL EVERY 4 HOURS PRN
Status: CANCELLED | OUTPATIENT
Start: 2021-01-01

## 2021-01-01 RX ORDER — LORAZEPAM 2 MG/ML
0.5 INJECTION INTRAMUSCULAR EVERY 4 HOURS PRN
Status: DISCONTINUED | OUTPATIENT
Start: 2021-01-01 | End: 2021-01-01

## 2021-01-01 RX ORDER — LORAZEPAM 0.5 MG/1
0.5 TABLET ORAL EVERY 6 HOURS PRN
Status: DISCONTINUED | OUTPATIENT
Start: 2021-01-01 | End: 2021-01-01 | Stop reason: HOSPADM

## 2021-01-01 RX ORDER — SODIUM CHLORIDE 0.9 % (FLUSH) 0.9 %
10 SYRINGE (ML) INJECTION AS NEEDED
Status: CANCELLED | OUTPATIENT
Start: 2021-01-01

## 2021-01-01 RX ORDER — MORPHINE SULFATE 2 MG/ML
2 INJECTION, SOLUTION INTRAMUSCULAR; INTRAVENOUS
Status: DISCONTINUED | OUTPATIENT
Start: 2021-01-01 | End: 2021-01-01 | Stop reason: HOSPADM

## 2021-01-01 RX ORDER — LORAZEPAM 0.5 MG/1
0.5 TABLET ORAL EVERY 6 HOURS PRN
Status: CANCELLED | OUTPATIENT
Start: 2021-01-01 | End: 2021-08-10

## 2021-01-01 RX ORDER — MORPHINE SULFATE 20 MG/ML
2 SOLUTION ORAL EVERY 4 HOURS PRN
Status: DISCONTINUED | OUTPATIENT
Start: 2021-01-01 | End: 2021-01-01 | Stop reason: HOSPADM

## 2021-01-01 RX ORDER — ACETAMINOPHEN 325 MG/1
650 TABLET ORAL EVERY 4 HOURS PRN
Status: DISCONTINUED | OUTPATIENT
Start: 2021-01-01 | End: 2021-01-01 | Stop reason: HOSPADM

## 2021-01-01 RX ORDER — KETOROLAC TROMETHAMINE 15 MG/ML
15 INJECTION, SOLUTION INTRAMUSCULAR; INTRAVENOUS EVERY 6 HOURS PRN
Status: DISCONTINUED | OUTPATIENT
Start: 2021-01-01 | End: 2021-01-01 | Stop reason: HOSPADM

## 2021-01-01 RX ORDER — POTASSIUM CHLORIDE 750 MG/1
40 CAPSULE, EXTENDED RELEASE ORAL AS NEEDED
Status: DISCONTINUED | OUTPATIENT
Start: 2021-01-01 | End: 2021-01-01

## 2021-01-01 RX ORDER — ONDANSETRON 2 MG/ML
4 INJECTION INTRAMUSCULAR; INTRAVENOUS EVERY 6 HOURS PRN
Status: CANCELLED | OUTPATIENT
Start: 2021-01-01

## 2021-01-01 RX ORDER — MORPHINE SULFATE 2 MG/ML
1 INJECTION, SOLUTION INTRAMUSCULAR; INTRAVENOUS
Status: DISCONTINUED | OUTPATIENT
Start: 2021-01-01 | End: 2021-01-01

## 2021-01-01 RX ORDER — BETHANECHOL CHLORIDE 10 MG/1
10 TABLET ORAL 3 TIMES DAILY
Status: DISCONTINUED | OUTPATIENT
Start: 2021-01-01 | End: 2021-01-01 | Stop reason: HOSPADM

## 2021-01-01 RX ORDER — MORPHINE SULFATE 2 MG/ML
2 INJECTION, SOLUTION INTRAMUSCULAR; INTRAVENOUS
Status: CANCELLED | OUTPATIENT
Start: 2021-01-01 | End: 2021-08-12

## 2021-01-01 RX ORDER — TAMSULOSIN HYDROCHLORIDE 0.4 MG/1
0.4 CAPSULE ORAL DAILY
Status: CANCELLED | OUTPATIENT
Start: 2021-01-01

## 2021-01-01 RX ORDER — HALOPERIDOL 5 MG/ML
1 INJECTION INTRAMUSCULAR EVERY 4 HOURS PRN
Status: DISCONTINUED | OUTPATIENT
Start: 2021-01-01 | End: 2021-01-01

## 2021-01-01 RX ORDER — PHENOBARBITAL SODIUM 65 MG/ML
65 INJECTION INTRAMUSCULAR EVERY 6 HOURS PRN
Status: DISCONTINUED | OUTPATIENT
Start: 2021-01-01 | End: 2021-01-01

## 2021-01-01 RX ORDER — ESTRADIOL 0.5 MG/1
0.5 TABLET ORAL DAILY
COMMUNITY

## 2021-01-01 RX ORDER — LORAZEPAM 2 MG/ML
1 INJECTION INTRAMUSCULAR
Status: DISCONTINUED | OUTPATIENT
Start: 2021-01-01 | End: 2021-01-01 | Stop reason: HOSPADM

## 2021-01-01 RX ORDER — LORAZEPAM 2 MG/ML
1 INJECTION INTRAMUSCULAR EVERY 4 HOURS
Status: DISCONTINUED | OUTPATIENT
Start: 2021-01-01 | End: 2021-01-01 | Stop reason: HOSPADM

## 2021-01-01 RX ORDER — ONDANSETRON 4 MG/1
4 TABLET, FILM COATED ORAL EVERY 6 HOURS PRN
Status: CANCELLED | OUTPATIENT
Start: 2021-01-01

## 2021-01-01 RX ORDER — POLYVINYL ALCOHOL 14 MG/ML
2 SOLUTION/ DROPS OPHTHALMIC
Status: DISCONTINUED | OUTPATIENT
Start: 2021-01-01 | End: 2021-01-01 | Stop reason: HOSPADM

## 2021-01-01 RX ORDER — LORAZEPAM 2 MG/ML
0.5 INJECTION INTRAMUSCULAR EVERY 6 HOURS PRN
Status: DISCONTINUED | OUTPATIENT
Start: 2021-01-01 | End: 2021-01-01

## 2021-01-01 RX ORDER — ACETAMINOPHEN 325 MG/1
650 TABLET ORAL EVERY 4 HOURS PRN
Status: CANCELLED | OUTPATIENT
Start: 2021-01-01

## 2021-01-01 RX ORDER — KETOROLAC TROMETHAMINE 15 MG/ML
10 INJECTION, SOLUTION INTRAMUSCULAR; INTRAVENOUS EVERY 6 HOURS PRN
Status: CANCELLED | OUTPATIENT
Start: 2021-01-01 | End: 2021-08-09

## 2021-01-01 RX ORDER — PHENOBARBITAL SODIUM 65 MG/ML
65 INJECTION INTRAMUSCULAR EVERY 4 HOURS PRN
Status: DISCONTINUED | OUTPATIENT
Start: 2021-01-01 | End: 2021-01-01 | Stop reason: HOSPADM

## 2021-01-01 RX ORDER — KETOROLAC TROMETHAMINE 15 MG/ML
10 INJECTION, SOLUTION INTRAMUSCULAR; INTRAVENOUS ONCE
Status: DISCONTINUED | OUTPATIENT
Start: 2021-01-01 | End: 2021-01-01 | Stop reason: HOSPADM

## 2021-01-01 RX ORDER — LORAZEPAM 2 MG/ML
1 INJECTION INTRAMUSCULAR
Status: DISCONTINUED | OUTPATIENT
Start: 2021-01-01 | End: 2021-01-01 | Stop reason: SDUPTHER

## 2021-01-01 RX ORDER — LORAZEPAM 2 MG/ML
0.5 INJECTION INTRAMUSCULAR
Status: DISCONTINUED | OUTPATIENT
Start: 2021-01-01 | End: 2021-01-01 | Stop reason: SDUPTHER

## 2021-01-01 RX ADMIN — BUSPIRONE HYDROCHLORIDE 5 MG: 5 TABLET ORAL at 08:09

## 2021-01-01 RX ADMIN — LORAZEPAM 1 MG: 2 INJECTION INTRAMUSCULAR; INTRAVENOUS at 05:52

## 2021-01-01 RX ADMIN — BUSPIRONE HYDROCHLORIDE 5 MG: 5 TABLET ORAL at 23:03

## 2021-01-01 RX ADMIN — BUSPIRONE HYDROCHLORIDE 5 MG: 5 TABLET ORAL at 17:15

## 2021-01-01 RX ADMIN — LORAZEPAM 0.5 MG: 2 INJECTION INTRAMUSCULAR; INTRAVENOUS at 16:24

## 2021-01-01 RX ADMIN — MORPHINE SULFATE 2 MG: 2 INJECTION, SOLUTION INTRAMUSCULAR; INTRAVENOUS at 17:45

## 2021-01-01 RX ADMIN — MORPHINE SULFATE 1 MG: 2 INJECTION, SOLUTION INTRAMUSCULAR; INTRAVENOUS at 18:19

## 2021-01-01 RX ADMIN — MINERAL OIL: 1000 LIQUID ORAL at 22:39

## 2021-01-01 RX ADMIN — SODIUM CHLORIDE, PRESERVATIVE FREE 10 ML: 5 INJECTION INTRAVENOUS at 09:19

## 2021-01-01 RX ADMIN — HYDROMORPHONE HYDROCHLORIDE 0.5 MG: 1 INJECTION, SOLUTION INTRAMUSCULAR; INTRAVENOUS; SUBCUTANEOUS at 18:56

## 2021-01-01 RX ADMIN — POTASSIUM CHLORIDE 40 MEQ: 1.5 POWDER, FOR SOLUTION ORAL at 10:33

## 2021-01-01 RX ADMIN — WATER 10 ML: 1 INJECTION INTRAMUSCULAR; INTRAVENOUS; SUBCUTANEOUS at 09:18

## 2021-01-01 RX ADMIN — LORAZEPAM 0.5 MG: 2 INJECTION INTRAMUSCULAR; INTRAVENOUS at 23:26

## 2021-01-01 RX ADMIN — HYDROMORPHONE HYDROCHLORIDE 1 MG: 1 INJECTION, SOLUTION INTRAMUSCULAR; INTRAVENOUS; SUBCUTANEOUS at 02:21

## 2021-01-01 RX ADMIN — QUETIAPINE FUMARATE 50 MG: 25 TABLET ORAL at 20:30

## 2021-01-01 RX ADMIN — LORAZEPAM 1 MG: 2 INJECTION INTRAMUSCULAR; INTRAVENOUS at 10:42

## 2021-01-01 RX ADMIN — HYDROMORPHONE HYDROCHLORIDE 1 MG: 1 INJECTION, SOLUTION INTRAMUSCULAR; INTRAVENOUS; SUBCUTANEOUS at 08:35

## 2021-01-01 RX ADMIN — HYDROMORPHONE HYDROCHLORIDE 1 MG: 1 INJECTION, SOLUTION INTRAMUSCULAR; INTRAVENOUS; SUBCUTANEOUS at 05:52

## 2021-01-01 RX ADMIN — BISACODYL 10 MG: 10 SUPPOSITORY RECTAL at 11:33

## 2021-01-01 RX ADMIN — SODIUM CHLORIDE, PRESERVATIVE FREE 10 ML: 5 INJECTION INTRAVENOUS at 08:09

## 2021-01-01 RX ADMIN — BUSPIRONE HYDROCHLORIDE 5 MG: 5 TABLET ORAL at 08:24

## 2021-01-01 RX ADMIN — LORAZEPAM 1 MG: 2 INJECTION INTRAMUSCULAR; INTRAVENOUS at 19:32

## 2021-01-01 RX ADMIN — LORAZEPAM 1 MG: 2 INJECTION INTRAMUSCULAR; INTRAVENOUS at 14:23

## 2021-01-01 RX ADMIN — HYDROMORPHONE HYDROCHLORIDE 1 MG: 1 INJECTION, SOLUTION INTRAMUSCULAR; INTRAVENOUS; SUBCUTANEOUS at 14:23

## 2021-01-01 RX ADMIN — HYDROMORPHONE HYDROCHLORIDE 1 MG: 1 INJECTION, SOLUTION INTRAMUSCULAR; INTRAVENOUS; SUBCUTANEOUS at 10:41

## 2021-01-01 RX ADMIN — KETOROLAC TROMETHAMINE 15 MG: 15 INJECTION, SOLUTION INTRAMUSCULAR; INTRAVENOUS at 22:58

## 2021-01-01 RX ADMIN — GLYCOPYRROLATE 0.2 MG: 0.2 INJECTION INTRAMUSCULAR; INTRAVENOUS at 05:51

## 2021-01-01 RX ADMIN — BUSPIRONE HYDROCHLORIDE 5 MG: 5 TABLET ORAL at 20:01

## 2021-01-01 RX ADMIN — QUETIAPINE FUMARATE 50 MG: 25 TABLET ORAL at 19:22

## 2021-01-01 RX ADMIN — HYDROMORPHONE HYDROCHLORIDE 1 MG: 1 INJECTION, SOLUTION INTRAMUSCULAR; INTRAVENOUS; SUBCUTANEOUS at 18:17

## 2021-01-01 RX ADMIN — HYDROMORPHONE HYDROCHLORIDE 1 MG: 1 INJECTION, SOLUTION INTRAMUSCULAR; INTRAVENOUS; SUBCUTANEOUS at 19:32

## 2021-01-01 RX ADMIN — MINERAL OIL: 1000 LIQUID ORAL at 02:21

## 2021-01-01 RX ADMIN — MORPHINE SULFATE 2 MG: 2 INJECTION, SOLUTION INTRAMUSCULAR; INTRAVENOUS at 22:36

## 2021-01-01 RX ADMIN — SODIUM CHLORIDE, PRESERVATIVE FREE 10 ML: 5 INJECTION INTRAVENOUS at 08:24

## 2021-01-01 RX ADMIN — FUROSEMIDE 20 MG: 10 INJECTION INTRAMUSCULAR; INTRAVENOUS at 02:20

## 2021-01-01 RX ADMIN — MINERAL OIL: 1000 LIQUID ORAL at 19:38

## 2021-01-01 RX ADMIN — ZIPRASIDONE MESYLATE 10 MG: 20 INJECTION, POWDER, LYOPHILIZED, FOR SOLUTION INTRAMUSCULAR at 09:17

## 2021-01-01 RX ADMIN — HYDROMORPHONE HYDROCHLORIDE 1 MG: 1 INJECTION, SOLUTION INTRAMUSCULAR; INTRAVENOUS; SUBCUTANEOUS at 15:40

## 2021-01-01 RX ADMIN — LORAZEPAM 0.5 MG: 2 INJECTION INTRAMUSCULAR; INTRAVENOUS at 14:55

## 2021-01-01 RX ADMIN — HALOPERIDOL LACTATE 1 MG: 5 INJECTION, SOLUTION INTRAMUSCULAR at 21:25

## 2021-01-01 RX ADMIN — MORPHINE SULFATE 2 MG: 2 INJECTION, SOLUTION INTRAMUSCULAR; INTRAVENOUS at 00:32

## 2021-01-01 RX ADMIN — SODIUM CHLORIDE, PRESERVATIVE FREE 10 ML: 5 INJECTION INTRAVENOUS at 20:51

## 2021-01-01 RX ADMIN — LORAZEPAM 1 MG: 2 INJECTION INTRAMUSCULAR; INTRAVENOUS at 15:39

## 2021-01-01 RX ADMIN — SODIUM CHLORIDE, PRESERVATIVE FREE 10 ML: 5 INJECTION INTRAVENOUS at 08:43

## 2021-01-01 RX ADMIN — LORAZEPAM 1 MG: 2 INJECTION INTRAMUSCULAR; INTRAVENOUS at 11:51

## 2021-01-01 RX ADMIN — MORPHINE SULFATE 2 MG: 2 INJECTION, SOLUTION INTRAMUSCULAR; INTRAVENOUS at 01:43

## 2021-01-01 RX ADMIN — MORPHINE SULFATE 1 MG: 2 INJECTION, SOLUTION INTRAMUSCULAR; INTRAVENOUS at 10:41

## 2021-01-01 RX ADMIN — LORAZEPAM 0.5 MG: 2 INJECTION INTRAMUSCULAR; INTRAVENOUS at 20:27

## 2021-01-01 RX ADMIN — MORPHINE SULFATE 2 MG: 2 INJECTION, SOLUTION INTRAMUSCULAR; INTRAVENOUS at 10:39

## 2021-01-01 RX ADMIN — BETHANECHOL CHLORIDE 10 MG: 10 TABLET ORAL at 20:06

## 2021-01-01 RX ADMIN — LORAZEPAM 1 MG: 2 INJECTION INTRAMUSCULAR; INTRAVENOUS at 02:21

## 2021-01-01 RX ADMIN — MORPHINE SULFATE 1 MG: 2 INJECTION, SOLUTION INTRAMUSCULAR; INTRAVENOUS at 22:07

## 2021-01-01 RX ADMIN — MORPHINE SULFATE 2 MG: 2 INJECTION, SOLUTION INTRAMUSCULAR; INTRAVENOUS at 20:00

## 2021-01-01 RX ADMIN — LORAZEPAM 0.5 MG: 2 INJECTION INTRAMUSCULAR; INTRAVENOUS at 03:39

## 2021-01-01 RX ADMIN — SODIUM CHLORIDE, PRESERVATIVE FREE 10 ML: 5 INJECTION INTRAVENOUS at 20:55

## 2021-01-01 RX ADMIN — SODIUM CHLORIDE, POTASSIUM CHLORIDE, SODIUM LACTATE AND CALCIUM CHLORIDE 100 ML/HR: 600; 310; 30; 20 INJECTION, SOLUTION INTRAVENOUS at 10:33

## 2021-01-01 RX ADMIN — LORAZEPAM 0.5 MG: 2 INJECTION INTRAMUSCULAR; INTRAVENOUS at 09:24

## 2021-01-01 RX ADMIN — LORAZEPAM 1 MG: 2 INJECTION INTRAMUSCULAR; INTRAVENOUS at 18:17

## 2021-01-01 RX ADMIN — LORAZEPAM 0.5 MG: 2 INJECTION INTRAMUSCULAR; INTRAVENOUS at 10:50

## 2021-01-01 RX ADMIN — ACETAMINOPHEN 650 MG: 325 TABLET ORAL at 10:50

## 2021-01-01 RX ADMIN — HYDROMORPHONE HYDROCHLORIDE 1 MG: 1 INJECTION, SOLUTION INTRAMUSCULAR; INTRAVENOUS; SUBCUTANEOUS at 22:59

## 2021-01-01 RX ADMIN — SODIUM CHLORIDE 1000 ML: 9 INJECTION, SOLUTION INTRAVENOUS at 09:24

## 2021-01-01 RX ADMIN — TAMSULOSIN HYDROCHLORIDE 0.4 MG: 0.4 CAPSULE ORAL at 08:24

## 2021-01-01 RX ADMIN — ENOXAPARIN SODIUM 40 MG: 40 INJECTION SUBCUTANEOUS at 20:55

## 2021-01-01 RX ADMIN — FUROSEMIDE 20 MG: 10 INJECTION INTRAMUSCULAR; INTRAVENOUS at 10:42

## 2021-01-01 RX ADMIN — MORPHINE SULFATE 2 MG: 2 INJECTION, SOLUTION INTRAMUSCULAR; INTRAVENOUS at 23:28

## 2021-01-01 RX ADMIN — SODIUM CHLORIDE, PRESERVATIVE FREE 10 ML: 5 INJECTION INTRAVENOUS at 22:38

## 2021-01-01 RX ADMIN — QUETIAPINE FUMARATE 50 MG: 25 TABLET ORAL at 23:01

## 2021-01-01 RX ADMIN — SCOPALAMINE 1 PATCH: 1 PATCH, EXTENDED RELEASE TRANSDERMAL at 22:36

## 2021-01-01 RX ADMIN — LORAZEPAM 0.5 MG: 2 INJECTION INTRAMUSCULAR; INTRAVENOUS at 18:56

## 2021-01-01 RX ADMIN — THIAMINE HYDROCHLORIDE 100 MG: 100 INJECTION, SOLUTION INTRAMUSCULAR; INTRAVENOUS at 09:51

## 2021-01-01 RX ADMIN — LORAZEPAM 1 MG: 2 INJECTION INTRAMUSCULAR; INTRAVENOUS at 22:37

## 2021-01-01 RX ADMIN — LORAZEPAM 0.5 MG: 0.5 TABLET ORAL at 20:02

## 2021-01-01 RX ADMIN — MORPHINE SULFATE 2 MG: 2 INJECTION, SOLUTION INTRAMUSCULAR; INTRAVENOUS at 12:56

## 2021-01-01 RX ADMIN — BUSPIRONE HYDROCHLORIDE 5 MG: 5 TABLET ORAL at 16:50

## 2021-01-01 RX ADMIN — HYDROMORPHONE HYDROCHLORIDE 1 MG: 1 INJECTION, SOLUTION INTRAMUSCULAR; INTRAVENOUS; SUBCUTANEOUS at 00:40

## 2021-01-01 RX ADMIN — TAMSULOSIN HYDROCHLORIDE 0.4 MG: 0.4 CAPSULE ORAL at 08:09

## 2021-01-01 RX ADMIN — LORAZEPAM 0.5 MG: 2 INJECTION INTRAMUSCULAR; INTRAVENOUS at 01:26

## 2021-01-01 RX ADMIN — BETHANECHOL CHLORIDE 10 MG: 10 TABLET ORAL at 09:01

## 2021-01-01 RX ADMIN — MORPHINE SULFATE 2 MG: 2 INJECTION, SOLUTION INTRAMUSCULAR; INTRAVENOUS at 14:00

## 2021-01-01 RX ADMIN — SODIUM CHLORIDE, POTASSIUM CHLORIDE, SODIUM LACTATE AND CALCIUM CHLORIDE 250 ML: 600; 310; 30; 20 INJECTION, SOLUTION INTRAVENOUS at 06:02

## 2021-01-01 RX ADMIN — QUETIAPINE FUMARATE 50 MG: 25 TABLET ORAL at 20:01

## 2021-01-01 RX ADMIN — TAMSULOSIN HYDROCHLORIDE 0.4 MG: 0.4 CAPSULE ORAL at 23:01

## 2021-01-01 RX ADMIN — LORAZEPAM 0.5 MG: 2 INJECTION INTRAMUSCULAR; INTRAVENOUS at 22:51

## 2021-01-01 RX ADMIN — BUSPIRONE HYDROCHLORIDE 5 MG: 5 TABLET ORAL at 20:30

## 2021-01-01 RX ADMIN — LORAZEPAM 0.25 MG: 2 INJECTION INTRAMUSCULAR; INTRAVENOUS at 10:41

## 2021-01-01 RX ADMIN — PHENOBARBITAL SODIUM 65 MG: 65 INJECTION INTRAMUSCULAR at 22:59

## 2021-01-01 RX ADMIN — HYDROMORPHONE HYDROCHLORIDE 1 MG: 1 INJECTION, SOLUTION INTRAMUSCULAR; INTRAVENOUS; SUBCUTANEOUS at 22:36

## 2021-01-01 RX ADMIN — POTASSIUM CHLORIDE 40 MEQ: 1.5 POWDER, FOR SOLUTION ORAL at 06:10

## 2021-01-01 RX ADMIN — FUROSEMIDE 20 MG: 10 INJECTION INTRAMUSCULAR; INTRAVENOUS at 18:16

## 2021-01-01 RX ADMIN — BETHANECHOL CHLORIDE 10 MG: 10 TABLET ORAL at 10:54

## 2021-01-01 RX ADMIN — QUETIAPINE FUMARATE 25 MG: 25 TABLET ORAL at 09:19

## 2021-01-01 RX ADMIN — HYDROMORPHONE HYDROCHLORIDE 0.5 MG: 1 INJECTION, SOLUTION INTRAMUSCULAR; INTRAVENOUS; SUBCUTANEOUS at 14:56

## 2021-01-01 RX ADMIN — ENOXAPARIN SODIUM 40 MG: 40 INJECTION SUBCUTANEOUS at 20:30

## 2021-01-01 RX ADMIN — MORPHINE SULFATE 2 MG: 2 INJECTION, SOLUTION INTRAMUSCULAR; INTRAVENOUS at 08:42

## 2021-01-01 RX ADMIN — GLYCOPYRROLATE 0.2 MG: 0.2 INJECTION INTRAMUSCULAR; INTRAVENOUS at 19:32

## 2021-01-01 RX ADMIN — GLYCOPYRROLATE 0.2 MG: 0.2 INJECTION INTRAMUSCULAR; INTRAVENOUS at 00:40

## 2021-01-01 RX ADMIN — LORAZEPAM 0.25 MG: 2 INJECTION INTRAMUSCULAR; INTRAVENOUS at 19:23

## 2021-01-01 RX ADMIN — MORPHINE SULFATE 2 MG: 2 INJECTION, SOLUTION INTRAMUSCULAR; INTRAVENOUS at 12:22

## 2021-01-01 RX ADMIN — BUSPIRONE HYDROCHLORIDE 5 MG: 5 TABLET ORAL at 09:19

## 2021-01-01 RX ADMIN — LORAZEPAM 1 MG: 2 INJECTION INTRAMUSCULAR; INTRAVENOUS at 10:05

## 2021-01-01 RX ADMIN — SODIUM CHLORIDE, PRESERVATIVE FREE 10 ML: 5 INJECTION INTRAVENOUS at 20:30

## 2021-01-01 RX ADMIN — MORPHINE SULFATE 2 MG: 2 INJECTION, SOLUTION INTRAMUSCULAR; INTRAVENOUS at 02:42

## 2021-01-01 RX ADMIN — BUSPIRONE HYDROCHLORIDE 5 MG: 5 TABLET ORAL at 15:16

## 2021-07-31 PROBLEM — R33.8 ACUTE URINARY RETENTION: Status: ACTIVE | Noted: 2021-01-01

## 2021-07-31 PROBLEM — E87.6 HYPOKALEMIA: Status: ACTIVE | Noted: 2021-01-01

## 2021-07-31 PROBLEM — F03.90 DEMENTIA (HCC): Status: ACTIVE | Noted: 2021-01-01

## 2021-07-31 PROBLEM — D72.829 LEUKOCYTOSIS: Status: ACTIVE | Noted: 2021-01-01

## 2021-07-31 PROBLEM — G93.40 ACUTE ENCEPHALOPATHY: Status: ACTIVE | Noted: 2021-01-01

## 2021-08-01 NOTE — PLAN OF CARE
Problem: Adult Inpatient Plan of Care  Goal: Plan of Care Review  Outcome: Ongoing, Progressing  Flowsheets (Taken 8/1/2021 1805)  Outcome Summary: Pt slept most of the day, not much input or output. Ashley DC'd. Bladder scanning every 4-6 and in/out cath if >500. Pt more agitated this evening, extra dose of ativan given. Daughter at bedside. VSS. will continue to monitor.  Goal: Patient-Specific Goal (Individualized)  Outcome: Ongoing, Progressing  Goal: Absence of Hospital-Acquired Illness or Injury  Outcome: Ongoing, Progressing  Intervention: Identify and Manage Fall Risk  Recent Flowsheet Documentation  Taken 8/1/2021 1800 by Sylvia Campbell, RN  Safety Promotion/Fall Prevention:   activity supervised   assistive device/personal items within reach   clutter free environment maintained   nonskid shoes/slippers when out of bed   room organization consistent   safety round/check completed  Taken 8/1/2021 1600 by Sylvia Campbell, RN  Safety Promotion/Fall Prevention:   activity supervised   assistive device/personal items within reach   clutter free environment maintained   nonskid shoes/slippers when out of bed   room organization consistent   safety round/check completed  Taken 8/1/2021 1400 by Sylvia Campbell, RN  Safety Promotion/Fall Prevention:   activity supervised   assistive device/personal items within reach   clutter free environment maintained   nonskid shoes/slippers when out of bed   room organization consistent   safety round/check completed  Taken 8/1/2021 1200 by Sylvia Campbell, RN  Safety Promotion/Fall Prevention:   activity supervised   assistive device/personal items within reach   clutter free environment maintained   nonskid shoes/slippers when out of bed   room organization consistent   safety round/check completed  Taken 8/1/2021 1000 by Sylvia Campbell, RN  Safety Promotion/Fall Prevention:   activity supervised   assistive device/personal items within reach   clutter free environment  maintained   nonskid shoes/slippers when out of bed   room organization consistent   safety round/check completed   fall prevention program maintained   gait belt  Taken 8/1/2021 0830 by Sylvia Campbell RN  Safety Promotion/Fall Prevention:   activity supervised   assistive device/personal items within reach   clutter free environment maintained   fall prevention program maintained   gait belt   nonskid shoes/slippers when out of bed   room organization consistent   safety round/check completed  Intervention: Prevent Skin Injury  Recent Flowsheet Documentation  Taken 8/1/2021 1800 by Sylvia Campbell RN  Body Position: position changed independently  Skin Protection:   adhesive use limited   skin sealant/moisture barrier applied   skin-to-device areas padded   skin-to-skin areas padded   tubing/devices free from skin contact  Taken 8/1/2021 1600 by Sylvia Campbell RN  Body Position: position changed independently  Skin Protection:   adhesive use limited   skin sealant/moisture barrier applied   skin-to-device areas padded   skin-to-skin areas padded   tubing/devices free from skin contact  Taken 8/1/2021 1400 by Sylvia Campbell RN  Body Position: position changed independently  Skin Protection:   adhesive use limited   skin sealant/moisture barrier applied   skin-to-device areas padded   skin-to-skin areas padded   tubing/devices free from skin contact  Taken 8/1/2021 1200 by Sylvia Campbell RN  Body Position: position changed independently  Skin Protection:   adhesive use limited   skin sealant/moisture barrier applied   skin-to-device areas padded   skin-to-skin areas padded   tubing/devices free from skin contact  Taken 8/1/2021 1000 by Sylvia Campbell RN  Body Position: position changed independently  Skin Protection:   adhesive use limited   incontinence pads utilized   skin sealant/moisture barrier applied   skin-to-device areas padded   skin-to-skin areas padded   tubing/devices free from skin  contact  Taken 8/1/2021 0830 by Sylvia Campbell RN  Body Position: position changed independently  Skin Protection:   adhesive use limited   incontinence pads utilized   skin sealant/moisture barrier applied   skin-to-device areas padded   skin-to-skin areas padded   tubing/devices free from skin contact  Intervention: Prevent and Manage VTE (venous thromboembolism) Risk  Recent Flowsheet Documentation  Taken 8/1/2021 0830 by Sylvia Campbell RN  VTE Prevention/Management:   bilateral   dorsiflexion/plantar flexion performed  Intervention: Prevent Infection  Recent Flowsheet Documentation  Taken 8/1/2021 1800 by Sylvia Campbell RN  Infection Prevention:   visitors restricted/screened   single patient room provided   rest/sleep promoted   hand hygiene promoted  Taken 8/1/2021 1600 by Sylvia Campbell RN  Infection Prevention:   rest/sleep promoted   visitors restricted/screened   single patient room provided   hand hygiene promoted  Taken 8/1/2021 1400 by Sylvia Campbell RN  Infection Prevention:   visitors restricted/screened   single patient room provided   rest/sleep promoted   hand hygiene promoted  Taken 8/1/2021 1200 by Sylvia Campbell RN  Infection Prevention:   visitors restricted/screened   single patient room provided   rest/sleep promoted   hand hygiene promoted  Taken 8/1/2021 1000 by Sylvia Campbell RN  Infection Prevention:   visitors restricted/screened   single patient room provided   rest/sleep promoted   hand hygiene promoted  Taken 8/1/2021 0830 by Sylvia Cambpell RN  Infection Prevention:   visitors restricted/screened   single patient room provided   rest/sleep promoted   hand hygiene promoted  Goal: Optimal Comfort and Wellbeing  Outcome: Ongoing, Progressing  Intervention: Provide Person-Centered Care  Recent Flowsheet Documentation  Taken 8/1/2021 0830 by Sylvia Campbell RN  Trust Relationship/Rapport:   care explained   choices provided   questions answered   reassurance provided    thoughts/feelings acknowledged  Goal: Readiness for Transition of Care  Outcome: Ongoing, Progressing     Problem: Fall Injury Risk  Goal: Absence of Fall and Fall-Related Injury  Outcome: Ongoing, Progressing  Intervention: Identify and Manage Contributors to Fall Injury Risk  Recent Flowsheet Documentation  Taken 8/1/2021 0830 by Sylvia Campbell RN  Medication Review/Management: medications reviewed  Intervention: Promote Injury-Free Environment  Recent Flowsheet Documentation  Taken 8/1/2021 1800 by Sylvia Campbell RN  Safety Promotion/Fall Prevention:   activity supervised   assistive device/personal items within reach   clutter free environment maintained   nonskid shoes/slippers when out of bed   room organization consistent   safety round/check completed  Taken 8/1/2021 1600 by Sylvia Campbell RN  Safety Promotion/Fall Prevention:   activity supervised   assistive device/personal items within reach   clutter free environment maintained   nonskid shoes/slippers when out of bed   room organization consistent   safety round/check completed  Taken 8/1/2021 1400 by Sylvia Campbell RN  Safety Promotion/Fall Prevention:   activity supervised   assistive device/personal items within reach   clutter free environment maintained   nonskid shoes/slippers when out of bed   room organization consistent   safety round/check completed  Taken 8/1/2021 1200 by Sylvia Campbell RN  Safety Promotion/Fall Prevention:   activity supervised   assistive device/personal items within reach   clutter free environment maintained   nonskid shoes/slippers when out of bed   room organization consistent   safety round/check completed  Taken 8/1/2021 1000 by Sylvia Campbell, RN  Safety Promotion/Fall Prevention:   activity supervised   assistive device/personal items within reach   clutter free environment maintained   nonskid shoes/slippers when out of bed   room organization consistent   safety round/check completed   fall prevention  program maintained   gait belt  Taken 8/1/2021 0830 by Sylvia Campbell RN  Safety Promotion/Fall Prevention:   activity supervised   assistive device/personal items within reach   clutter free environment maintained   fall prevention program maintained   gait belt   nonskid shoes/slippers when out of bed   room organization consistent   safety round/check completed     Problem: Skin Injury Risk Increased  Goal: Skin Health and Integrity  Outcome: Ongoing, Progressing  Intervention: Optimize Skin Protection  Recent Flowsheet Documentation  Taken 8/1/2021 1800 by Sylvia Campbell RN  Pressure Reduction Techniques:   frequent weight shift encouraged   heels elevated off bed   positioned off wounds   pressure points protected  Head of Bed (HOB): HOB at 30-45 degrees  Pressure Reduction Devices:   pressure-redistributing mattress utilized   positioning supports utilized   heel offloading device utilized  Skin Protection:   adhesive use limited   skin sealant/moisture barrier applied   skin-to-device areas padded   skin-to-skin areas padded   tubing/devices free from skin contact  Taken 8/1/2021 1600 by Sylvia Campbell RN  Pressure Reduction Techniques:   frequent weight shift encouraged   heels elevated off bed   positioned off wounds   pressure points protected  Head of Bed (HOB): HOB at 30-45 degrees  Pressure Reduction Devices:   pressure-redistributing mattress utilized   positioning supports utilized   heel offloading device utilized  Skin Protection:   adhesive use limited   skin sealant/moisture barrier applied   skin-to-device areas padded   skin-to-skin areas padded   tubing/devices free from skin contact  Taken 8/1/2021 1400 by Sylvia Campbell RN  Pressure Reduction Techniques:   frequent weight shift encouraged   heels elevated off bed   positioned off wounds   pressure points protected  Head of Bed (HOB): HOB at 30-45 degrees  Pressure Reduction Devices:   pressure-redistributing mattress utilized    positioning supports utilized   heel offloading device utilized  Skin Protection:   adhesive use limited   skin sealant/moisture barrier applied   skin-to-device areas padded   skin-to-skin areas padded   tubing/devices free from skin contact  Taken 8/1/2021 1200 by Sylvia Campbell RN  Pressure Reduction Techniques:   frequent weight shift encouraged   heels elevated off bed   positioned off wounds   pressure points protected  Head of Bed (HOB): HOB at 30-45 degrees  Pressure Reduction Devices:   pressure-redistributing mattress utilized   positioning supports utilized   heel offloading device utilized  Skin Protection:   adhesive use limited   skin sealant/moisture barrier applied   skin-to-device areas padded   skin-to-skin areas padded   tubing/devices free from skin contact  Taken 8/1/2021 1000 by Sylvia Campbell RN  Pressure Reduction Techniques:   frequent weight shift encouraged   heels elevated off bed   positioned off wounds   pressure points protected  Head of Bed (HOB): HOB at 30-45 degrees  Pressure Reduction Devices:   pressure-redistributing mattress utilized   positioning supports utilized   heel offloading device utilized  Skin Protection:   adhesive use limited   incontinence pads utilized   skin sealant/moisture barrier applied   skin-to-device areas padded   skin-to-skin areas padded   tubing/devices free from skin contact  Taken 8/1/2021 0830 by Sylvia Campbell RN  Pressure Reduction Techniques:   frequent weight shift encouraged   heels elevated off bed   positioned off wounds   pressure points protected  Head of Bed (HOB): HOB at 30-45 degrees  Pressure Reduction Devices:   positioning supports utilized   heel offloading device utilized   pressure-redistributing mattress utilized  Skin Protection:   adhesive use limited   incontinence pads utilized   skin sealant/moisture barrier applied   skin-to-device areas padded   skin-to-skin areas padded   tubing/devices free from skin contact   Goal  Outcome Evaluation:              Outcome Summary: Pt slept most of the day, not much input or output. Ashley DC'd. Bladder scanning every 4-6 and in/out cath if >500. Pt more agitated this evening, extra dose of ativan given. Daughter at bedside. VSS. will continue to monitor.

## 2021-08-01 NOTE — PROGRESS NOTES
[facility] Medicine Services  PROGRESS NOTE    Patient Name: Rebecca Terrazas  : 1950  MRN: [id number]    Date of Admission: 2021  Primary Care Physician: Delroy Boswell MD    Subjective   Subjective     CC:  Follow-up agitation, urinary retention    HPI:  Predominantly nonverbal, family at bedside states that she is at her approximate baseline.  Ashley catheter removed this morning and has not voided, but has not been eating or drinking much.  No events overnight.    ROS:  Unable to be reliably obtained due to advanced dementia    Objective   Objective     Vital Signs:   Temp:  [97.6 °F (36.4 °C)-98.6 °F (37 °C)] 97.6 °F (36.4 °C)  Heart Rate:  [61-95] 63  Resp:  [14-16] 16  BP: ()/() 141/84     Physical Exam:  Constitutional: Awake, alert, chronically ill-appearing female, appears older than stated age  HENT: NCAT, mucous membranes moist  Respiratory: Clear to auscultation bilaterally, respiratory effort normal   Cardiovascular: RRR, no murmurs, rubs, or gallops, palpable radial pulses  Gastrointestinal: Positive bowel sounds, soft, nontender, nondistended  Musculoskeletal: No bilateral ankle edema  Psychiatric: Nonverbal    Results Reviewed:  LAB RESULTS:      Lab 21  0447 21  0931   WBC 9.17 13.49*   HEMOGLOBIN 13.4 14.4   HEMATOCRIT 42.0 42.3   PLATELETS 266 318   NEUTROS ABS  --  10.95*   IMMATURE GRANS (ABS)  --  0.05   LYMPHS ABS  --  1.18   MONOS ABS  --  1.20*   EOS ABS  --  0.04   MCV 94.8 89.6   SED RATE  --  38*   PROCALCITONIN  --  0.07         Lab 21  0447 21  0931   SODIUM 139 142   POTASSIUM 3.4* 3.4*   CHLORIDE 107 107   CO2 21.0* 20.0*   ANION GAP 11.0 15.0   BUN 8 8   CREATININE 0.76 0.88   GLUCOSE 87 114*   CALCIUM 8.9 9.1   MAGNESIUM  --  1.8   TSH  --  4.470*         Lab 21  0931   TOTAL PROTEIN 7.6   ALBUMIN 4.00   GLOBULIN 3.6   ALT (SGPT) 9   AST (SGOT) 22   BILIRUBIN 0.6   ALK PHOS 151*         Lab  07/31/21  0931   TROPONIN T 0.017                 Brief Urine Lab Results  (Last result in the past 365 days)      Color   Clarity   Blood   Leuk Est   Nitrite   Protein   CREAT   Urine HCG        07/31/21 0943 Yellow Clear Negative Negative Negative Negative               Microbiology Results Abnormal     Procedure Component Value - Date/Time    COVID-19 and FLU A/B PCR - Swab, Nasopharynx [[id number]]  (Normal) Collected: 07/31/21 0943    Lab Status: Final result Specimen: Swab from Nasopharynx Updated: 07/31/21 1021     COVID19 Not Detected     Influenza A PCR Not Detected     Influenza B PCR Not Detected    Narrative:      Fact sheet for providers: https://www.fda.gov/media/618295/download    Fact sheet for patients: https://www.fda.gov/media/391993/download    Test performed by PCR.          CT Abdomen Pelvis Without Contrast    Result Date: 7/31/2021  EXAMINATION: CT ABDOMEN/PELVIS WO CONTRAST - 07/31/2021  INDICATION: Generalized abdominal pain.  TECHNIQUE: Multiple axial CT imaging is obtained of the abdomen and pelvis without the administration of intravenous contrast.  The radiation dose reduction device was turned on for each scan per the ALARA (As Low as Reasonably Achievable) protocol.  COMPARISON: None.  FINDINGS: There is a hiatal hernia identified. Portion of the stomach is seen intrathoracically. The lung bases are otherwise grossly clear. The liver is homogeneous in appearance. The spleen is unremarkable. The kidneys and adrenal glands within normal limits. Pancreas is homogeneous. No free fluid or free air. No abnormal mass or fluid collections identified. Diverticulosis of the colon without evidence of diverticulitis. No abdominal or retroperitoneal lymphadenopathy. Small umbilical hernia containing fat only.  PELVIS: The pelvic organs are unremarkable. The pelvic portion gastrointestinal tract within normal limits. Diverticulosis with no evidence of diverticulitis. No free fluid or free air. No  abnormal mass or fluid collections identified. Degenerative changes seen within the spine and pelvis.      Impression: There is partial intrathoracic stomach posteriorly on the left with no evidence of acute intra-abdominal or pelvic abnormality. Diverticulosis with no evidence of diverticulitis. Minimal atherosclerotic disease within the abdominal aorta and iliac vessels with degenerative changes seen within the spine and pelvis. Small umbilical hernia containing fat only.  DICTATED:   07/31/2021 EDITED/ls :   07/31/2021      CT Head Without Contrast    Result Date: 7/31/2021  EXAMINATION: CT HEAD WO CONTRAST-  INDICATION: ams mental status change  TECHNIQUE: Multiple axial CT imaging is obtained of the head from skull base of skull vertex without the ministration of intravenous contrast.  The radiation dose reduction device was turned on for each scan per the ALARA (As Low as Reasonably Achievable) protocol.  COMPARISON: NONE  FINDINGS: There is atrophy the brain. Some low-density area seen in the periventricular and subcortical white matter suggesting chronic small vessel ischemic change. There is no hemorrhage or hydrocephalus. There is no mass, mass effect, midline shift. The bony structures reveal no evidence of osseous abnormality. There is some mucosal thickening involving the left sphenoid sinus. The mastoid air cells are patent.        Impression: Atrophy and chronic changes seen within the brain. No evidence of acute intracranial abnormality.       XR Chest 1 View    Result Date: 7/31/2021   EXAMINATION: XR CHEST 1 VW-  INDICATION: Weak/Dizzy/AMS triage protocol  COMPARISON: NONE  FINDINGS: Portable chest reveals cardiac and mediastinal silhouettes within normal limits. There is a small to moderate size HIDA hernia. The lung fields are grossly clear. No focal right opacification present. A pleural effusion or pneumothorax. Bony structures are unremarkable. Pulmonary vascularity is within normal limits.          Impression: No acute cardiopulmonary disease.             I have reviewed the medications:  Scheduled Meds:busPIRone, 5 mg, Oral, TID  enoxaparin, 40 mg, Subcutaneous, Q24H  QUEtiapine, 50 mg, Oral, Nightly  sodium chloride, 10 mL, Intravenous, Q12H  tamsulosin, 0.4 mg, Oral, Daily      Continuous Infusions:   PRN Meds:.•  acetaminophen  •  LORazepam  •  ondansetron **OR** ondansetron  •  potassium chloride **OR** potassium chloride **OR** potassium chloride  •  sodium chloride  •  sodium chloride    Assessment/Plan   Assessment & Plan     Active Hospital Problems    Diagnosis  POA   • **Acute encephalopathy [G93.40]  Yes   • Hypokalemia [E87.6]  Yes   • Dementia (CMS/HCC) [F03.90]  Yes   • Leukocytosis [D72.829]  Yes   • Acute urinary retention [R33.8]  Yes      Resolved Hospital Problems   No resolved problems to display.        Brief Hospital Course to date:  Rebecca Terrazas is a 70 y.o. female with advanced dementia previously on hospice for 9 months before being discharged from their service who presented to hospital with increased agitation found to be retaining urine with improvement in agitation post placement of Ashley catheter    The following problems new to me today    Assessment/plan    Acute urinary retention  -Voiding trial today  -Continue Flomax    Advanced dementia with acute agitation  Anxiety  -CT brain without acute process  -Behavior improved after placement of Ashley catheter  -Continue BuSpar, quetiapine  -IVF (poor oral intake)      DVT prophylaxis:  Medical DVT prophylaxis orders are present.       Disposition: I expect the patient to be discharged once patient able to urinate spontaneously.    CODE STATUS:   Code Status and Medical Interventions:   Ordered at: 07/31/21 1517     Code Status:    CPR     Medical Interventions (Level of Support Prior to Arrest):    Full       Paresh Ring, DO  08/01/21

## 2021-08-01 NOTE — PLAN OF CARE
Problem: Adult Inpatient Plan of Care  Goal: Plan of Care Review  Outcome: Ongoing, Progressing   Goal Outcome Evaluation:   VSS on RA. Family at bedside. Pt became agitated, picking at IV and monitors, PRN ativan given with relief noted. K+ 3.4, replacing per protocol. Urine output 175 mL this shift, Bladder scanned pt, with 0 mL resulted, irrigated F/C.  Notified ANTONELLA Crockett, See order. Will continue to monitor.

## 2021-08-01 NOTE — CASE MANAGEMENT/SOCIAL WORK
Discharge Planning Assessment  [facility]     Patient Name: Rebecca Terrazas  MRN: [id number]  Today's Date: 8/1/2021    Admit Date: 7/31/2021    Discharge Needs Assessment     Row Name 08/01/21 1600       Living Environment    Lives With  spouse    Name(s) of Who Lives With Patient  Claude Wells-     Current Living Arrangements  home/apartment/condo    Primary Care Provided by  spouse/significant other    Provides Primary Care For  no one, unable/limited ability to care for self    Caregiving Concerns  Claude Wells-     Family Caregiver if Needed  spouse    Quality of Family Relationships  helpful;involved;supportive    Able to Return to Prior Arrangements  yes       Transition Planning    Patient/Family Anticipates Transition to  home with help/services    Patient/Family Anticipated Services at Transition  home health care;    Transportation Anticipated  family or friend will provide       Discharge Needs Assessment    Readmission Within the Last 30 Days  no previous admission in last 30 days    Equipment Currently Used at Home  -- bath bench    Concerns to be Addressed  cognitive/perceptual;discharge planning;home safety    Outpatient/Agency/Support Group Needs  homecare agency    Discharge Facility/Level of Care Needs  home with home health    Current Discharge Risk  cognitively impaired;dependent with mobility/activities of daily living        Discharge Plan     Row Name 08/01/21 1603       Plan    Plan  Home with Home Health    Patient/Family in Agreement with Plan  yes    Plan Comments  I have spoken to Claude Wells, Mrs. Terrazas  by phone today to initiate a discharge plan.  He states that he and his wife share a home in [facility].  He reports that his daughter lives nearby.  Mr. Terrazas states that his wife is independent with toileting and dressing however requires supervision.  He states that she needs assistance with bathing.  Mr. Terrazas reports that his wife does not  use any DME and the presence of equipment is a trigger for agitation.  Mrs. Terrazas has  a history of Dementia and is being followed by [facility].  He denies currently receiving home health/outpatient services.  I have placed orders for PT/OT.  Mr. Terrazas states that he plans to care for his wife at home and will likely provide transportation when she is ready to be discharged.   Case Management will continue to follow Mrs. Terrazas plan of care, discuss PT/OT recommendations with Mr. Terrazas and assist with discharge planning including home health referrals as appropriate.    Final Discharge Disposition Code  06 - home with home health care        Continued Care and Services - Admitted Since 7/31/2021    Coordination has not been started for this encounter.         Demographic Summary     Row Name 08/01/21 2619       General Information    General Information Comments  I have confirmed with Mr. Terrazas that his wife, Mrs. Terrazas PCP is Delroy Boswell MD and her insurance is [employer]        Functional Status     Row Name 08/01/21 1600       Functional Status, IADL    Medications  completely dependent    Meal Preparation  completely dependent    Housekeeping  completely dependent    Laundry  completely dependent    Shopping  completely dependent        Psychosocial    No documentation.       Abuse/Neglect    No documentation.       Legal    No documentation.       Substance Abuse    No documentation.       Patient Forms     Row Name 08/01/21 2965       Patient Forms    Patient Observation Letter  Delivered    Delivered to  Support person    Method of delivery  Telephone            Dena Tovar RN

## 2021-08-02 NOTE — CONSULTS
Delroy Boswell MD  Consulting physician: Paresh Ring  Reason for referral : goc discussion, ACP, symptom management  Chief Complaint   Patient presents with   • Altered Mental Status       HPI:   69yo female with Alzheimer's dementia, diagnosed 13 years ago, presents to ED on 7/31 with AMS and agitation. Found to have urinary retention, relieved with placement of remy catheter (900mL), changed to indwelling with increase agitation with catheter attempts.  Patient was home hospice patient for most of 2020.   Symptoms:   DaughterElla, reports patient has started to have more functional decline and different behaviors over the last several weeks.   + urinary frequency, becoming more easily agitated. Required to be fed.  Has been able to ambulate at home  Daughter sees restlessness/agitation to be related to pain and/or with underlying dementia not understanding what is happening.  Advance care planning discussed:   Code Status:  Reviewed with Ella oden, changed to no cpr with full comfort interventions  Current Code Status     Date Active Code Status Order ID Comments User Context       8/2/2021 1135 No CPR [id number]  Paresh Ring, DO Inpatient     Advance Care Planning Activity      Questions for Current Code Status     Question Answer Comment    Code Status No CPR     Medical Interventions (Level of Support Prior to Arrest) Limited     Limited Support to NOT Include Intubation      Vasopressors     Comments Per daughter     Level Of Support Discussed With Next of Kin (If No Surrogate)         Advance Directive: none on medical record  Surrogate decision maker: Ella oden  Past Medical History:   Diagnosis Date   • Anxiety    • Dementia (CMS/[facility])    • Depression    • Hearing loss    • Hyperlipidemia    • Hypertension    • Vertigo      Past Surgical History:   Procedure Laterality Date   • HYSTERECTOMY         Reviewed current scheduled and prn medications for route, type, dose and  frequency.    Current Facility-Administered Medications   Medication Dose Route Frequency Provider Last Rate Last Admin   • acetaminophen (TYLENOL) tablet 650 mg  650 mg Oral Q4H PRN Nancy Rai MD   650 mg at 08/02/21 1050   • bethanechol (URECHOLINE) tablet 10 mg  10 mg Oral TID Paresh Ring DO   10 mg at 08/02/21 1054   • busPIRone (BUSPAR) tablet 5 mg  5 mg Oral TID Nancy Rai MD   5 mg at 08/02/21 0824   • enoxaparin (LOVENOX) syringe 40 mg  40 mg Subcutaneous Q24H Nancy Rai MD   40 mg at 08/01/21 2030   • lidocaine (LMX) 4 % cream   Topical PRN Paresh Ring DO       • LORazepam (ATIVAN) injection 0.25 mg  0.25 mg Intravenous Q6H PRN Paresh Ring DO       • ondansetron (ZOFRAN) tablet 4 mg  4 mg Oral Q6H PRN Nancy Rai MD        Or   • ondansetron (ZOFRAN) injection 4 mg  4 mg Intravenous Q6H PRN Nancy Rai MD       • potassium chloride (MICRO-K) CR capsule 40 mEq  40 mEq Oral PRN Nancy Rai MD        Or   • potassium chloride (KLOR-CON) packet 40 mEq  40 mEq Oral PRN Nancy Rai MD   40 mEq at 08/01/21 1033    Or   • potassium chloride 10 mEq in 100 mL IVPB  10 mEq Intravenous Q1H PRN Nancy Rai MD       • [START ON 8/3/2021] QUEtiapine (SEROquel) tablet 25 mg  25 mg Oral Daily Paresh Ring DO       • QUEtiapine (SEROquel) tablet 50 mg  50 mg Oral Nightly Nancy Rai MD   50 mg at 08/01/21 2030   • sodium chloride 0.9 % flush 10 mL  10 mL Intravenous PRN Mark Morales MD       • sodium chloride 0.9 % flush 10 mL  10 mL Intravenous Q12H Nancy Rai MD   10 mL at 08/02/21 0824   • sodium chloride 0.9 % flush 10 mL  10 mL Intravenous PRN Nancy Rai MD       • tamsulosin (FLOMAX) 24 hr capsule 0.4 mg  0.4 mg Oral Daily Nancy Rai MD   0.4 mg at 08/02/21 0824        •  acetaminophen  •  lidocaine  •  LORazepam  •  ondansetron **OR** ondansetron  •  potassium chloride **OR** potassium chloride **OR**  "potassium chloride  •  sodium chloride  •  sodium chloride  Allergies   Allergen Reactions   • Erythromycin Nausea And Vomiting     Family History   Problem Relation Age of Onset   • Heart disease Father    • Mental illness Sister    • Breast cancer Neg Hx    • Ovarian cancer Neg Hx      Social History     Socioeconomic History   • Marital status:      Spouse name: Not on file   • Number of children: Not on file   • Years of education: Not on file   • Highest education level: Not on file   Tobacco Use   • Smoking status: Never Smoker   • Smokeless tobacco: Never Used   Substance and Sexual Activity   • Alcohol use: Yes   • Drug use: No   • Sexual activity: Defer       Review of Systems - +/- per HPI and symptom review.    PPS: 20%  /75 (BP Location: Right arm, Patient Position: Lying)   Pulse 77   Temp 98.3 °F (36.8 °C) (Oral)   Resp 18   Ht 160 cm (62.99\")   Wt 59.3 kg (130 lb 12.8 oz)   LMP  (LMP Unknown)   SpO2 98%   BMI 23.18 kg/m²   59.3 kg (130 lb 12.8 oz) Body mass index is 23.18 kg/m².  Intake & Output (last day)       08/01 0701 - 08/02 0700 08/02 0701 - 08/03 0700    P.O. 454     I.V. (mL/kg)      IV Piggyback      Total Intake(mL/kg) 454 (7.7)     Urine (mL/kg/hr) 500 (0.4)     Total Output 500     Net -46               Physical Exam:  General Appearance: No acute distress, somnolent, resting in bed  Head: Normocephalic without obvious abnormality, atraumatic  Lungs: Clear to auscultation, respirations regular, even and non-labored  Heart: Regular rhythm and normal rate, normal S1  Abdomen: Normal bowel sounds, soft, non-distended, non-tender  Extremities:  no redness, no cyanosis, no edema  Pulses: Distal pulses palpable and equal bilaterally  Skin: Warm and dry  Neurological: unresponsive to light touch, no myoclonus  Reviewed labs and diagnostic results.  No results found for: HGBA1C  Results from last 7 days   Lab Units 08/01/21  0447   WBC 10*3/mm3 9.17   HEMOGLOBIN g/dL 13.4 "   HEMATOCRIT % 42.0   PLATELETS 10*3/mm3 266     Results from last 7 days   Lab Units 08/01/21 2045 08/01/21 0447 08/01/21 0447 07/31/21  0931 07/31/21  0931   SODIUM mmol/L  --   --  139   < > 142   POTASSIUM mmol/L 3.8   < > 3.4*   < > 3.4*   CHLORIDE mmol/L  --   --  107   < > 107   CO2 mmol/L  --   --  21.0*   < > 20.0*   BUN mg/dL  --   --  8   < > 8   CREATININE mg/dL  --   --  0.76   < > 0.88   CALCIUM mg/dL  --   --  8.9   < > 9.1   BILIRUBIN mg/dL  --   --   --   --  0.6   ALK PHOS U/L  --   --   --   --  151*   ALT (SGPT) U/L  --   --   --   --  9   AST (SGOT) U/L  --   --   --   --  22   GLUCOSE mg/dL  --   --  87   < > 114*    < > = values in this interval not displayed.     Results from last 7 days   Lab Units 08/01/21 2045 08/01/21 0447 08/01/21 0447   SODIUM mmol/L  --   --  139   POTASSIUM mmol/L 3.8   < > 3.4*   CHLORIDE mmol/L  --   --  107   CO2 mmol/L  --   --  21.0*   BUN mg/dL  --   --  8   CREATININE mg/dL  --   --  0.76   GLUCOSE mg/dL  --   --  87   CALCIUM mg/dL  --   --  8.9    < > = values in this interval not displayed.     Imaging Results (Last 72 Hours)     Procedure Component Value Units Date/Time    XR Chest 1 View [[id number]] Collected: 07/31/21 1014     Updated: 08/02/21 0834    Narrative:         EXAMINATION: XR CHEST 1 VW - 07/31/2021     INDICATION: Weakness, dizziness, altered mental status.     COMPARISON: None.     FINDINGS: Portable chest reveals cardiac and mediastinal silhouettes  within normal limits. There is a small to moderate size hiatal hernia.  The lung fields are grossly clear. No focal parenchymal opacification is  present. No pleural effusion or pneumothorax. The bony structures are  unremarkable. The pulmonary vascularity is within normal limits.       Impression:      No acute cardiopulmonary disease.     DICTATED:   07/31/2021  EDITED/ls :   08/01/2021     This report was finalized on 8/2/2021 8:31 AM by Dr. Hayley Lofton MD.       CT Head Without  Contrast [[id number]] Collected: 07/31/21 1341     Updated: 08/01/21 1411    Narrative:      EXAMINATION: CT HEAD WO CONTRAST - 07/31/2021     INDICATION: Mental status change.     TECHNIQUE: Multiple axial CT imaging was obtained of the head from skull  base to skull vertex without the administration of intravenous contrast.     The radiation dose reduction device was turned on for each scan per the  ALARA (As Low as Reasonably Achievable) protocol.     COMPARISON: None.     FINDINGS: There is atrophy of the brain. Some low-density area seen in  the periventricular and subcortical white matter suggesting chronic  small vessel ischemic change. There is no hemorrhage or hydrocephalus.  There is no mass, mass effect, or midline shift. The bony structures  reveal no evidence of osseous abnormality. There is some mucosal  thickening involving the left sphenoid sinus. The mastoid air cells are  patent.       Impression:      Atrophy and chronic changes seen within the brain. No  evidence of acute intracranial abnormality.     DICTATED:   07/31/2021  EDITED/ls :   08/01/2021          CT Abdomen Pelvis Without Contrast [[id number]] Collected: 07/31/21 1345     Updated: 07/31/21 1619    Narrative:      EXAMINATION: CT ABDOMEN/PELVIS WO CONTRAST - 07/31/2021     INDICATION: Generalized abdominal pain.     TECHNIQUE: Multiple axial CT imaging is obtained of the abdomen and  pelvis without the administration of intravenous contrast.     The radiation dose reduction device was turned on for each scan per the  ALARA (As Low as Reasonably Achievable) protocol.     COMPARISON: None.     FINDINGS: There is a hiatal hernia identified. Portion of the stomach is  seen intrathoracically. The lung bases are otherwise grossly clear. The  liver is homogeneous in appearance. The spleen is unremarkable. The  kidneys and adrenal glands within normal limits. Pancreas is  homogeneous. No free fluid or free air. No abnormal mass or  fluid  collections identified. Diverticulosis of the colon without evidence of  diverticulitis. No abdominal or retroperitoneal lymphadenopathy. Small  umbilical hernia containing fat only.     PELVIS: The pelvic organs are unremarkable. The pelvic portion  gastrointestinal tract within normal limits. Diverticulosis with no  evidence of diverticulitis. No free fluid or free air. No abnormal mass  or fluid collections identified. Degenerative changes seen within the  spine and pelvis.       Impression:      There is partial intrathoracic stomach posteriorly on the  left with no evidence of acute intra-abdominal or pelvic abnormality.  Diverticulosis with no evidence of diverticulitis. Minimal  atherosclerotic disease within the abdominal aorta and iliac vessels  with degenerative changes seen within the spine and pelvis. Small  umbilical hernia containing fat only.     DICTATED:   07/31/2021  EDITED/ls :   07/31/2021           Impression: 70 y.o. female with Alzheimer's dementia with behavior disturbances, urinary retention  Plan:   Agitation - was managed with quetiapine, iv lorazepam effective  Dose decreased during day time with increase tiredness.   Patient may need more rest as she has been unable to rest for awhile with urinary frequency.   Prn iv morphine if agitation not relieved with lorazepam    Urinary retention - relieved with indwelling remy catheter, discharge with catheter    Diminished po intake - patient ate applesauce/sweet potatoes on 8/1.      Goals of care discussion - met with daughter, Ella, at bedside. She included communication with patient's  per text.   Ella shared that the patient around 6 weeks ago has had a change in behavior/function and increase care giving needs.   Reviewed patient's wishes was not too prolong, as she discussed with the family at beginning of her dementia.   Talked about different choices of plans of care and focus of interventions going forward.   Agreed  to transition to comfort focused plan of care, request hospice referral and plan on returning home with hospice care.     Palliative team provide support to pt/family.    Nhi Yun, APRN  [phone]  08/02/21  12:15 EDT    Time: 60  minutes spent reviewing medical and medication records, assessing and examining patient, discussing with patient, family and nursing staff, answering questions, formulating a plan and documentation of care. > 50% time spent face to face

## 2021-08-02 NOTE — PROGRESS NOTES
[facility] Medicine Services  PROGRESS NOTE    Patient Name: Rebecca Terrazas  : 1950  MRN: [id number]    Date of Admission: 2021  Primary Care Physician: Delroy Boswell MD    Subjective   Subjective     CC:  Follow-up urinary retention    HPI:  Became very agitated late in the day/into the evening yesterday, requiring more Ativan, now this morning very lethargic and not interactive.  Unable to void spontaneously yesterday requiring I&O cath, family requesting no more I&O catheterizations and to just place a Ashley.  Daughter is requesting palliative care and urology consultations.    ROS:  Unable to be reliably obtained due to patient's advanced dementia    Objective   Objective     Vital Signs:   Temp:  [97.7 °F (36.5 °C)-98.8 °F (37.1 °C)] 98.4 °F (36.9 °C)  Heart Rate:  [] 77  Resp:  [14-16] 16  BP: (116-165)/(68-94) 116/68     Physical Exam:  Constitutional: Lethargic elderly female laying in bed, appears older than stated age  HENT: NCAT, mucous membranes moist  Respiratory: Clear to auscultation bilaterally, respiratory effort normal   Cardiovascular: RRR, no murmurs, rubs, or gallops, palpable radial pulses  Gastrointestinal: Positive bowel sounds, soft, nontender, nondistended  Musculoskeletal: No bilateral ankle edema  Psychiatric: Nonverbal    Results Reviewed:  LAB RESULTS:      Lab 21  0931   WBC 9.17 13.49*   HEMOGLOBIN 13.4 14.4   HEMATOCRIT 42.0 42.3   PLATELETS 266 318   NEUTROS ABS  --  10.95*   IMMATURE GRANS (ABS)  --  0.05   LYMPHS ABS  --  1.18   MONOS ABS  --  1.20*   EOS ABS  --  0.04   MCV 94.8 89.6   SED RATE  --  38*   PROCALCITONIN  --  0.07         Lab 21  0931   SODIUM  --  139 142   POTASSIUM 3.8 3.4* 3.4*   CHLORIDE  --  107 107   CO2  --  21.0* 20.0*   ANION GAP  --  11.0 15.0   BUN  --  8 8   CREATININE  --  0.76 0.88   GLUCOSE  --  87 114*   CALCIUM  --  8.9 9.1   MAGNESIUM  --    --  1.8   TSH  --   --  4.470*         Lab 07/31/21  0931   TOTAL PROTEIN 7.6   ALBUMIN 4.00   GLOBULIN 3.6   ALT (SGPT) 9   AST (SGOT) 22   BILIRUBIN 0.6   ALK PHOS 151*         Lab 07/31/21  0931   TROPONIN T 0.017                 Brief Urine Lab Results  (Last result in the past 365 days)      Color   Clarity   Blood   Leuk Est   Nitrite   Protein   CREAT   Urine HCG        07/31/21 0943 Yellow Clear Negative Negative Negative Negative               Microbiology Results Abnormal     Procedure Component Value - Date/Time    COVID-19 and FLU A/B PCR - Swab, Nasopharynx [[id number]]  (Normal) Collected: 07/31/21 0943    Lab Status: Final result Specimen: Swab from Nasopharynx Updated: 07/31/21 1021     COVID19 Not Detected     Influenza A PCR Not Detected     Influenza B PCR Not Detected    Narrative:      Fact sheet for providers: https://www.fda.gov/media/185368/download    Fact sheet for patients: https://www.fda.gov/media/969771/download    Test performed by PCR.          CT Abdomen Pelvis Without Contrast    Result Date: 7/31/2021  EXAMINATION: CT ABDOMEN/PELVIS WO CONTRAST - 07/31/2021  INDICATION: Generalized abdominal pain.  TECHNIQUE: Multiple axial CT imaging is obtained of the abdomen and pelvis without the administration of intravenous contrast.  The radiation dose reduction device was turned on for each scan per the ALARA (As Low as Reasonably Achievable) protocol.  COMPARISON: None.  FINDINGS: There is a hiatal hernia identified. Portion of the stomach is seen intrathoracically. The lung bases are otherwise grossly clear. The liver is homogeneous in appearance. The spleen is unremarkable. The kidneys and adrenal glands within normal limits. Pancreas is homogeneous. No free fluid or free air. No abnormal mass or fluid collections identified. Diverticulosis of the colon without evidence of diverticulitis. No abdominal or retroperitoneal lymphadenopathy. Small umbilical hernia containing fat only.   PELVIS: The pelvic organs are unremarkable. The pelvic portion gastrointestinal tract within normal limits. Diverticulosis with no evidence of diverticulitis. No free fluid or free air. No abnormal mass or fluid collections identified. Degenerative changes seen within the spine and pelvis.      Impression: There is partial intrathoracic stomach posteriorly on the left with no evidence of acute intra-abdominal or pelvic abnormality. Diverticulosis with no evidence of diverticulitis. Minimal atherosclerotic disease within the abdominal aorta and iliac vessels with degenerative changes seen within the spine and pelvis. Small umbilical hernia containing fat only.  DICTATED:   07/31/2021 EDITED/ls :   07/31/2021      CT Head Without Contrast    Result Date: 8/1/2021  EXAMINATION: CT HEAD WO CONTRAST - 07/31/2021  INDICATION: Mental status change.  TECHNIQUE: Multiple axial CT imaging was obtained of the head from skull base to skull vertex without the administration of intravenous contrast.  The radiation dose reduction device was turned on for each scan per the ALARA (As Low as Reasonably Achievable) protocol.  COMPARISON: None.  FINDINGS: There is atrophy of the brain. Some low-density area seen in the periventricular and subcortical white matter suggesting chronic small vessel ischemic change. There is no hemorrhage or hydrocephalus. There is no mass, mass effect, or midline shift. The bony structures reveal no evidence of osseous abnormality. There is some mucosal thickening involving the left sphenoid sinus. The mastoid air cells are patent.      Impression: Atrophy and chronic changes seen within the brain. No evidence of acute intracranial abnormality.  DICTATED:   07/31/2021 EDITED/ls :   08/01/2021       XR Chest 1 View    Result Date: 8/1/2021   EXAMINATION: XR CHEST 1 VW - 07/31/2021  INDICATION: Weakness, dizziness, altered mental status.  COMPARISON: None.  FINDINGS: Portable chest reveals cardiac and  mediastinal silhouettes within normal limits. There is a small to moderate size hiatal hernia. The lung fields are grossly clear. No focal parenchymal opacification is present. No pleural effusion or pneumothorax. The bony structures are unremarkable. The pulmonary vascularity is within normal limits.      Impression: No acute cardiopulmonary disease.  DICTATED:   07/31/2021 EDITED/ls :   08/01/2021              I have reviewed the medications:  Scheduled Meds:busPIRone, 5 mg, Oral, TID  enoxaparin, 40 mg, Subcutaneous, Q24H  QUEtiapine, 50 mg, Oral, Nightly  sodium chloride, 10 mL, Intravenous, Q12H  tamsulosin, 0.4 mg, Oral, Daily      Continuous Infusions:   PRN Meds:.•  acetaminophen  •  LORazepam  •  ondansetron **OR** ondansetron  •  potassium chloride **OR** potassium chloride **OR** potassium chloride  •  sodium chloride  •  sodium chloride    Assessment/Plan   Assessment & Plan     Active Hospital Problems    Diagnosis  POA   • **Acute encephalopathy [G93.40]  Yes   • Hypokalemia [E87.6]  Yes   • Dementia (CMS/HCC) [F03.90]  Yes   • Leukocytosis [D72.829]  Yes   • Acute urinary retention [R33.8]  Yes      Resolved Hospital Problems   No resolved problems to display.        Brief Hospital Course to date:  Rebecca Terrazas is a 70 y.o. female with advanced dementia previously on hospice for 9 months before being discharged from their service who presented to hospital with increased agitation found to be retaining urine with improvement in agitation post placement of Ashley catheter; now failing voiding trial and having difficulty balancing agitation with oversedation    Assessment/plan    Acute urinary retention  Chronic immobility  -Continue Flomax, add bethanechol  -Daughter requesting no more I&O catheterization, anchor Ashley  -Discussed with daughter no acute need for inpatient evaluation by urology, she is requesting consultation due to patient's inability to follow-up outpatient (100%  homebound)    Advanced dementia with acute agitation  Anxiety  -CT brain without acute process  -Behavior initially improved after placement of Ashley catheter  -Continue BuSpar  -Continue nighttime Seroquel  -Decrease dose of Ativan due to oversedation, start daily Seroquel tomorrow a.m. to help with daytime agitation  -Palliative care consultation    DVT prophylaxis:  Medical DVT prophylaxis orders are present.       Disposition: Palliative care to see the patient/daughter today, goals of care discussion, symptom control; patient has previously been on hospice and may benefit from returning home with the same.    CODE STATUS:   Code Status and Medical Interventions:   Ordered at: 07/31/21 1517     Code Status:    CPR     Medical Interventions (Level of Support Prior to Arrest):    Full       Paresh Ring, DO  08/02/21

## 2021-08-02 NOTE — DISCHARGE PLACEMENT REQUEST
"Mk Raul OCONNOR (70 y.o. Female)   Referring: ANTONELLA Tolliver  PCP: Dr. Maggie Powell/Dr. 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  BMI: 23.18kg    Date of Birth Social Security Number Address Home Phone MRN    1950  [address] [place] [address] [phone] [id number]    Mosque Marital Status          Unknown        Admission Date Admission Type Admitting Provider Attending Provider Department, Room/Bed    7/31/21 Emergency Paresh Ring, Paresh Vazquez,  [address], S508/1    Discharge Date Discharge Disposition Discharge Destination                       Attending Provider: Paresh Ring DO    Allergies: Erythromycin    Isolation: None   Infection: None   Code Status: No CPR    Ht: 160 cm (62.99\")   Wt: 59.3 kg (130 lb 12.8 oz)    Admission Cmt: None   Principal Problem: Acute encephalopathy [G93.40]                 Active Insurance as of 7/31/2021     Primary Coverage     Payor Plan Insurance Group Employer/Plan Group    [place] [employer] MEDICARE REPLACEMENT [facility] MEDICARE REPLACEMENT [id number]     Payor Plan Address Payor Plan Phone Number Payor Plan Fax Number Effective Dates    [address] [address]   1/1/2016 - None Entered    [facility] [id number]       Subscriber Name Subscriber Birth Date Member ID       RAUL MERCEDES 1950 [id number]                 Emergency Contacts      (Rel.) Home Phone Work Phone Mobile Phone    Wells,Claude (Spouse) [phone] -- --            Emergency Contact Information     Name Relation Home Work Mobile    Wells,Claude Spouse [phone]            Insurance Information                [employer] MED[employer] REPLACEMENT/[employer] MEDICARE REPLACEMENT Phone:     Subscriber: Raul Mercedes Subscriber#: [id number]    Group#: [id number] Precert#:           Problem List         Codes Noted - Resolved       Hospital    * " (Principal) Acute encephalopathy ICD-10-CM: G93.40  ICD-9-CM: 348.30 2021 - Present    Hypokalemia ICD-10-CM: E87.6  ICD-9-CM: 276.8 2021 - Present    Dementia (CMS/HCC) ICD-10-CM: F03.90  ICD-9-CM: 294.20 2021 - Present    Leukocytosis ICD-10-CM: D72.829  ICD-9-CM: 288.60 2021 - Present    Acute urinary retention ICD-10-CM: R33.8  ICD-9-CM: 788.29 2021 - Present       Non-Hospital    Other insomnia ICD-10-CM: G47.09  ICD-9-CM: 780.52 10/1/2019 - Present    Late onset Alzheimer's disease without behavioral disturbance (CMS/HCC) ICD-10-CM: G30.1, F02.80  ICD-9-CM: 331.0, 294.10 2019 - Present    Asymptomatic gallstones ICD-10-CM: K80.20  ICD-9-CM: 574.20 2017 - Present    Seasonal allergic rhinitis due to pollen ICD-10-CM: J30.1  ICD-9-CM: 477.0 2017 - Present    Bilateral hearing loss ICD-10-CM: H91.93  ICD-9-CM: 389.9 2017 - Present    Anxiety ICD-10-CM: F41.9  ICD-9-CM: 300.00 2016 - Present    Depression ICD-10-CM: F32.9  ICD-9-CM: 311 2016 - Present    Diverticulosis of intestine ICD-10-CM: K57.90  ICD-9-CM: 562.10 2016 - Present    Hyperlipidemia ICD-10-CM: E78.5  ICD-9-CM: 272.4 2016 - Present    Hypertension ICD-10-CM: I10  ICD-9-CM: 401.9 2016 - Present    Vitamin D deficiency ICD-10-CM: E55.9  ICD-9-CM: 268.9 2016 - Present    Strain of lumbar paraspinal muscle ICD-10-CM: S39.012A  ICD-9-CM: 847.2 2016 - Present             History & Physical      Nancy Rai MD at 21 [address] [place] [address] Medicine Services  HISTORY AND PHYSICAL    Patient Name: Rebecca Terrazas  : 1950  MRN: [id number]  Primary Care Physician: Delroy Boswell MD  Date of admission: 2021      Subjective   Subjective     Chief Complaint:  AMS    HPI:  Rebecca Terrazas is a 70 y.o. female with hx of dementia previously followed by Dr. Faustin/Julee Barnard of Neurology (last seen 2020) who presents due to altered  "mental status and agitation. Apparently patient missed a dose of her Seroquel and then became agitated. Patient does not speak much at baseline (can say her daughter's name sometimes but not much else) but she is able to get around alright at home. lives with her  who is 83 years old. Previously was on home hospice for ~9 months last year but did well and was discharged from that, currently she is followed by Palliative Care ([facility]). Workup revealed acute urinary retention. Ashley catheter placed with improvement. She received Zyprexa and Ativan for her agitation and is now too drowsy to go home. Admitted for further close observation.     Daughter reports patient is always \"running to the bathroom\" but is unsure about retention or incontinence issues.     COVID Details:    Symptoms:    [x] NONE [] Fever []  Cough [] Shortness of breath [] Change in taste/smell      The patient qualifies to receive the vaccine, but they have not yet received it.      Review of Systems   Gen-no fevers, no chills  CV-no chest pain, no palpitations  Resp-no cough, no dyspnea  GI-no N/V/D, no abd pain    All other systems reviewed and negative except any additional pertinent positives and negatives as discussed in HPI.    History obtained from daughter at bedside.     Personal History     Past Medical History:   Diagnosis Date   • Anxiety    • Dementia (CMS/HCC)    • Depression    • Hearing loss    • Hyperlipidemia    • Hypertension    • Vertigo        Past Surgical History:   Procedure Laterality Date   • HYSTERECTOMY         Family History: family history includes Heart disease in her father; Mental illness in her sister. Otherwise pertinent FHx was reviewed and unremarkable.     Social History:  reports that she has never smoked. She has never used smokeless tobacco. She reports current alcohol use. She reports that she does not use drugs.  Social History     Social History Narrative   • Not on file "       Medications:  Available home medication information reviewed.  (Not in a hospital admission)      Allergies   Allergen Reactions   • Erythromycin Nausea And Vomiting       Objective   Objective     Vital Signs:   Temp:  [97.9 °F (36.6 °C)] 97.9 °F (36.6 °C)  Heart Rate:  [78-95] 78  Resp:  [14] 14  BP: (112-161)/() 121/77       Physical Exam   Constitutional: sedated, snoring  Eyes: PERRLA, sclerae anicteric, no conjunctival injection  HENT: NCAT, mucous membranes moist  Neck: Supple, no thyromegaly, no lymphadenopathy, trachea midline  Respiratory: Clear to auscultation bilaterally, nonlabored respirations   Cardiovascular: RRR, no murmurs, rubs, or gallops, palpable pedal pulses bilaterally  Gastrointestinal: Positive bowel sounds, soft, nontender, nondistended  Musculoskeletal: No bilateral ankle edema, no clubbing or cyanosis to extremities  Psychiatric: Unable to assess  Neurologic: sedated currently, no focal deficits, Cranial Nerves grossly intact to confrontation  Skin: No rashes      Result Review:  I have personally reviewed the results from the time of this admission to 7/31/2021 15:17 EDT and agree with these findings:  [x]  Laboratory  []  Microbiology  [x]  Radiology  []  EKG/Telemetry   []  Cardiology/Vascular   []  Pathology  []  Old records  []  Other:    LAB RESULTS:      Lab 07/31/21  0931   WBC 13.49*   HEMOGLOBIN 14.4   HEMATOCRIT 42.3   PLATELETS 318   NEUTROS ABS 10.95*   IMMATURE GRANS (ABS) 0.05   LYMPHS ABS 1.18   MONOS ABS 1.20*   EOS ABS 0.04   MCV 89.6   SED RATE 38*   PROCALCITONIN 0.07         Lab 07/31/21  0931   SODIUM 142   POTASSIUM 3.4*   CHLORIDE 107   CO2 20.0*   ANION GAP 15.0   BUN 8   CREATININE 0.88   GLUCOSE 114*   CALCIUM 9.1   MAGNESIUM 1.8   TSH 4.470*         Lab 07/31/21  0931   TOTAL PROTEIN 7.6   ALBUMIN 4.00   GLOBULIN 3.6   ALT (SGPT) 9   AST (SGOT) 22   BILIRUBIN 0.6   ALK PHOS 151*         Lab 07/31/21  0931   TROPONIN T 0.017                 UA     Urinalysis 7/31/21   Specific [place], UA 1.013   Ketones, UA Trace (A)   Blood, UA Negative   Leukocytes, UA Negative   Nitrite, UA Negative   (A) Abnormal value              Microbiology Results (last 10 days)     Procedure Component Value - Date/Time    COVID-19 and FLU A/B PCR - Swab, Nasopharynx [[id number]]  (Normal) Collected: 07/31/21 0943    Lab Status: Final result Specimen: Swab from Nasopharynx Updated: 07/31/21 1021     COVID19 Not Detected     Influenza A PCR Not Detected     Influenza B PCR Not Detected    Narrative:      Fact sheet for providers: https://www.fda.gov/media/174240/download    Fact sheet for patients: https://www.fda.gov/media/753754/download    Test performed by PCR.          CT Abdomen Pelvis Without Contrast    Result Date: 7/31/2021  EXAMINATION: CT ABDOMEN PELVIS WO CONTRAST-  INDICATION: pain generalized abdominal pain  TECHNIQUE: Multiple axial CT imaging is obtained of the abdomen and pelvis without the ministration of intravenous contrast.  The radiation dose reduction device was turned on for each scan per the ALARA (As Low as Reasonably Achievable) protocol.  COMPARISON: NONE  FINDINGS: There is a hiatal hernia identified. Portion of the stomach is seen intrathoracic bleed. The lung bases are otherwise grossly clear. The liver is homogeneous in appearance. The spleen is unremarkable. The kidneys and adrenal glands within normal limits. Pancreas is homogeneous. No free fluid or free air. No abnormal mass or fluid collections identified. Diverticulosis of the colon without evidence of diverticulitis. No abdominal retroperitoneal lymphadenopathy. Small old lacunar hernia containing fat only.  Pelvis: The pelvic organs are unremarkable. The pelvic portion the gastrointestinal tract within normal limits. Diverticulosis with no evidence of diverticulitis. No free fluid or free air. No abnormal mass or fluid collections identified. Degenerative changes seen within the spine and  pelvis.        Impression: There is partial intrathoracic stomach posteriorly on the left with no evidence of acute intra-abdominal or pelvic abnormality. Diverticulosis with no evidence of diverticulitis. Minimal atherosclerotic disease within the abdominal aorta and iliac vessels with degenerative changes seen within the spine and pelvis. Small umbilical hernia containing fat only.       CT Head Without Contrast    Result Date: 7/31/2021  EXAMINATION: CT HEAD WO CONTRAST-  INDICATION: ams mental status change  TECHNIQUE: Multiple axial CT imaging is obtained of the head from skull base of skull vertex without the ministration of intravenous contrast.  The radiation dose reduction device was turned on for each scan per the ALARA (As Low as Reasonably Achievable) protocol.  COMPARISON: NONE  FINDINGS: There is atrophy the brain. Some low-density area seen in the periventricular and subcortical white matter suggesting chronic small vessel ischemic change. There is no hemorrhage or hydrocephalus. There is no mass, mass effect, midline shift. The bony structures reveal no evidence of osseous abnormality. There is some mucosal thickening involving the left sphenoid sinus. The mastoid air cells are patent.        Impression: Atrophy and chronic changes seen within the brain. No evidence of acute intracranial abnormality.       XR Chest 1 View    Result Date: 7/31/2021   EXAMINATION: XR CHEST 1 VW-  INDICATION: Weak/Dizzy/AMS triage protocol  COMPARISON: NONE  FINDINGS: Portable chest reveals cardiac and mediastinal silhouettes within normal limits. There is a small to moderate size HIDA hernia. The lung fields are grossly clear. No focal right opacification present. A pleural effusion or pneumothorax. Bony structures are unremarkable. Pulmonary vascularity is within normal limits.         Impression: No acute cardiopulmonary disease.             Assessment/Plan   Assessment & Plan     Active Hospital Problems     Diagnosis  POA   • **Acute encephalopathy [G93.40]  Yes   • Hypokalemia [E87.6]  Yes   • Dementia (CMS/HCC) [F03.90]  Yes   • Leukocytosis [D72.829]  Yes   • Acute urinary retention [R33.8]  Yes       69 yo F with hx of dementia previously followed by Dr. Faustin/Julee Barnard of Neurology (last seen April 2020) who presents due to altered mental status and agitation. Workup revealed acute urinary retention. Ashley catheter placed with improvement. She received Zyprexa and Ativan for her agitation and is now too drowsy to go home. Admitted for observation.     Acute encephalopathy  Dementia  Anxiety  --CT head negative.  --Improved after Ashley was placed, so likely agitated secondary to retention, along with missing a dose of Seroquel.  --Now sedated following meds administered in ER.   --Continue home Buspar and Seroquel as tolerated. Need to confirm dose with daughter/ as reportedly she takes Seroquel at 9AM and 4PM at home but unclear if it is the 50 mg dose that is listed on her med rec.     Acute urinary retention  --Ashley catheter placed in ER.   --UA negative.  --Start Flomax.   --Voiding trial in AM?    Leukocytosis  --UA, CXR, CT A/P negative.  --Afebrile, normal procal.  --Monitor.     Hypokalemia  --Replace per protocol.    DVT prophylaxis:  Lovenox    May be able to d/c home as early as tomorrow if improved.     CODE STATUS:    Code Status and Medical Interventions:   Ordered at: 07/31/21 2767     Code Status:    CPR     Medical Interventions (Level of Support Prior to Arrest):    Full       Admission Status:  I believe this patient meets OBSERVATION status, however if further evaluation or treatment plans warrant, status may change.  Based upon current information, I predict patient's care encounter to be less than or equal to 2 midnights.      Nancy Rai MD  07/31/21      Electronically signed by Nancy Rai MD at 07/31/21 8799         Current Facility-Administered Medications    Medication Dose Route Frequency Provider Last Rate Last Admin   • acetaminophen (TYLENOL) tablet 650 mg  650 mg Oral Q4H PRN Nancy Rai MD   650 mg at 21 1050   • bethanechol (URECHOLINE) tablet 10 mg  10 mg Oral TID Paresh Ring DO   10 mg at 21 1054   • busPIRone (BUSPAR) tablet 5 mg  5 mg Oral TID Nancy Rai MD   5 mg at 21 0824   • enoxaparin (LOVENOX) syringe 40 mg  40 mg Subcutaneous Q24H Nancy Rai MD   40 mg at 21   • lidocaine (LMX) 4 % cream   Topical PRN Paresh Ring DO       • LORazepam (ATIVAN) injection 0.25 mg  0.25 mg Intravenous Q6H PRN Paresh Ring DO       • morphine injection 1 mg  1 mg Intravenous Q1H PRN Nhi Yun, APRN       • ondansetron (ZOFRAN) tablet 4 mg  4 mg Oral Q6H PRN Nancy Rai MD        Or   • ondansetron (ZOFRAN) injection 4 mg  4 mg Intravenous Q6H PRN Nancy Rai MD       • [START ON 8/3/2021] QUEtiapine (SEROquel) tablet 25 mg  25 mg Oral Daily Paresh Ring DO       • QUEtiapine (SEROquel) tablet 50 mg  50 mg Oral Nightly Nancy Rai MD   50 mg at 21   • sodium chloride 0.9 % flush 10 mL  10 mL Intravenous PRN Mark Morales MD       • sodium chloride 0.9 % flush 10 mL  10 mL Intravenous Q12H Nancy Rai MD   10 mL at 21 0824   • sodium chloride 0.9 % flush 10 mL  10 mL Intravenous PRN Nancy Rai MD       • tamsulosin (FLOMAX) 24 hr capsule 0.4 mg  0.4 mg Oral Daily Nancy Rai MD   0.4 mg at 21 0824        Physician Progress Notes (last 72 hours) (Notes from 21 1541 through 21 1541)      Paresh Ring DO at 21 0732              [facility] Services  PROGRESS NOTE    Patient Name: Rebecca Terrazas  : 1950  MRN: [id number]    Date of Admission: 2021  Primary Care Physician: Delroy Boswell MD    Subjective   Subjective     CC:  Follow-up urinary  retention    HPI:  Became very agitated late in the day/into the evening yesterday, requiring more Ativan, now this morning very lethargic and not interactive.  Unable to void spontaneously yesterday requiring I&O cath, family requesting no more I&O catheterizations and to just place a Ashley.  Daughter is requesting palliative care and urology consultations.    ROS:  Unable to be reliably obtained due to patient's advanced dementia    Objective   Objective     Vital Signs:   Temp:  [97.7 °F (36.5 °C)-98.8 °F (37.1 °C)] 98.4 °F (36.9 °C)  Heart Rate:  [] 77  Resp:  [14-16] 16  BP: (116-165)/(68-94) 116/68     Physical Exam:  Constitutional: Lethargic elderly female laying in bed, appears older than stated age  HENT: NCAT, mucous membranes moist  Respiratory: Clear to auscultation bilaterally, respiratory effort normal   Cardiovascular: RRR, no murmurs, rubs, or gallops, palpable radial pulses  Gastrointestinal: Positive bowel sounds, soft, nontender, nondistended  Musculoskeletal: No bilateral ankle edema  Psychiatric: Nonverbal    Results Reviewed:  LAB RESULTS:      Lab 08/01/21 0447 07/31/21  0931   WBC 9.17 13.49*   HEMOGLOBIN 13.4 14.4   HEMATOCRIT 42.0 42.3   PLATELETS 266 318   NEUTROS ABS  --  10.95*   IMMATURE GRANS (ABS)  --  0.05   LYMPHS ABS  --  1.18   MONOS ABS  --  1.20*   EOS ABS  --  0.04   MCV 94.8 89.6   SED RATE  --  38*   PROCALCITONIN  --  0.07         Lab 08/01/21 2045 08/01/21 0447 07/31/21  0931   SODIUM  --  139 142   POTASSIUM 3.8 3.4* 3.4*   CHLORIDE  --  107 107   CO2  --  21.0* 20.0*   ANION GAP  --  11.0 15.0   BUN  --  8 8   CREATININE  --  0.76 0.88   GLUCOSE  --  87 114*   CALCIUM  --  8.9 9.1   MAGNESIUM  --   --  1.8   TSH  --   --  4.470*         Lab 07/31/21  0931   TOTAL PROTEIN 7.6   ALBUMIN 4.00   GLOBULIN 3.6   ALT (SGPT) 9   AST (SGOT) 22   BILIRUBIN 0.6   ALK PHOS 151*         Lab 07/31/21  0931   TROPONIN T 0.017                 Brief Urine Lab Results  (Last  result in the past 365 days)      Color   Clarity   Blood   Leuk Est   Nitrite   Protein   CREAT   Urine HCG        07/31/21 0943 Yellow Clear Negative Negative Negative Negative               Microbiology Results Abnormal     Procedure Component Value - Date/Time    COVID-19 and FLU A/B PCR - Swab, Nasopharynx [[id number]]  (Normal) Collected: 07/31/21 0943    Lab Status: Final result Specimen: Swab from Nasopharynx Updated: 07/31/21 1021     COVID19 Not Detected     Influenza A PCR Not Detected     Influenza B PCR Not Detected    Narrative:      Fact sheet for providers: https://www.fda.gov/media/654621/download    Fact sheet for patients: https://www.fda.gov/media/407041/download    Test performed by PCR.          CT Abdomen Pelvis Without Contrast    Result Date: 7/31/2021  EXAMINATION: CT ABDOMEN/PELVIS WO CONTRAST - 07/31/2021  INDICATION: Generalized abdominal pain.  TECHNIQUE: Multiple axial CT imaging is obtained of the abdomen and pelvis without the administration of intravenous contrast.  The radiation dose reduction device was turned on for each scan per the ALARA (As Low as Reasonably Achievable) protocol.  COMPARISON: None.  FINDINGS: There is a hiatal hernia identified. Portion of the stomach is seen intrathoracically. The lung bases are otherwise grossly clear. The liver is homogeneous in appearance. The spleen is unremarkable. The kidneys and adrenal glands within normal limits. Pancreas is homogeneous. No free fluid or free air. No abnormal mass or fluid collections identified. Diverticulosis of the colon without evidence of diverticulitis. No abdominal or retroperitoneal lymphadenopathy. Small umbilical hernia containing fat only.  PELVIS: The pelvic organs are unremarkable. The pelvic portion gastrointestinal tract within normal limits. Diverticulosis with no evidence of diverticulitis. No free fluid or free air. No abnormal mass or fluid collections identified. Degenerative changes seen within  the spine and pelvis.      Impression: There is partial intrathoracic stomach posteriorly on the left with no evidence of acute intra-abdominal or pelvic abnormality. Diverticulosis with no evidence of diverticulitis. Minimal atherosclerotic disease within the abdominal aorta and iliac vessels with degenerative changes seen within the spine and pelvis. Small umbilical hernia containing fat only.  DICTATED:   07/31/2021 EDITED/ls :   07/31/2021      CT Head Without Contrast    Result Date: 8/1/2021  EXAMINATION: CT HEAD WO CONTRAST - 07/31/2021  INDICATION: Mental status change.  TECHNIQUE: Multiple axial CT imaging was obtained of the head from skull base to skull vertex without the administration of intravenous contrast.  The radiation dose reduction device was turned on for each scan per the ALARA (As Low as Reasonably Achievable) protocol.  COMPARISON: None.  FINDINGS: There is atrophy of the brain. Some low-density area seen in the periventricular and subcortical white matter suggesting chronic small vessel ischemic change. There is no hemorrhage or hydrocephalus. There is no mass, mass effect, or midline shift. The bony structures reveal no evidence of osseous abnormality. There is some mucosal thickening involving the left sphenoid sinus. The mastoid air cells are patent.      Impression: Atrophy and chronic changes seen within the brain. No evidence of acute intracranial abnormality.  DICTATED:   07/31/2021 EDITED/ls :   08/01/2021       XR Chest 1 View    Result Date: 8/1/2021   EXAMINATION: XR CHEST 1 VW - 07/31/2021  INDICATION: Weakness, dizziness, altered mental status.  COMPARISON: None.  FINDINGS: Portable chest reveals cardiac and mediastinal silhouettes within normal limits. There is a small to moderate size hiatal hernia. The lung fields are grossly clear. No focal parenchymal opacification is present. No pleural effusion or pneumothorax. The bony structures are unremarkable. The pulmonary  vascularity is within normal limits.      Impression: No acute cardiopulmonary disease.  DICTATED:   07/31/2021 EDITED/ls :   08/01/2021              I have reviewed the medications:  Scheduled Meds:busPIRone, 5 mg, Oral, TID  enoxaparin, 40 mg, Subcutaneous, Q24H  QUEtiapine, 50 mg, Oral, Nightly  sodium chloride, 10 mL, Intravenous, Q12H  tamsulosin, 0.4 mg, Oral, Daily      Continuous Infusions:   PRN Meds:.•  acetaminophen  •  LORazepam  •  ondansetron **OR** ondansetron  •  potassium chloride **OR** potassium chloride **OR** potassium chloride  •  sodium chloride  •  sodium chloride    Assessment/Plan   Assessment & Plan     Active Hospital Problems    Diagnosis  POA   • **Acute encephalopathy [G93.40]  Yes   • Hypokalemia [E87.6]  Yes   • Dementia (CMS/HCC) [F03.90]  Yes   • Leukocytosis [D72.829]  Yes   • Acute urinary retention [R33.8]  Yes      Resolved Hospital Problems   No resolved problems to display.        Brief Hospital Course to date:  Rebecca Terrazas is a 70 y.o. female with advanced dementia previously on hospice for 9 months before being discharged from their service who presented to hospital with increased agitation found to be retaining urine with improvement in agitation post placement of Ashley catheter; now failing voiding trial and having difficulty balancing agitation with oversedation    Assessment/plan    Acute urinary retention  Chronic immobility  -Continue Flomax, add bethanechol  -Daughter requesting no more I&O catheterization, anchor Ashley  -Discussed with daughter no acute need for inpatient evaluation by urology, she is requesting consultation due to patient's inability to follow-up outpatient (100% homebound)    Advanced dementia with acute agitation  Anxiety  -CT brain without acute process  -Behavior initially improved after placement of Ashley catheter  -Continue BuSpar  -Continue nighttime Seroquel  -Decrease dose of Ativan due to oversedation, start daily Seroquel tomorrow  a.m. to help with daytime agitation  -Palliative care consultation    DVT prophylaxis:  Medical DVT prophylaxis orders are present.       Disposition: Palliative care to see the patient/daughter today, goals of care discussion, symptom control; patient has previously been on hospice and may benefit from returning home with the same.    CODE STATUS:   Code Status and Medical Interventions:   Ordered at: 21 1517     Code Status:    CPR     Medical Interventions (Level of Support Prior to Arrest):    Full       Paresh Ring DO  21                Electronically signed by Paresh Ring DO at 21 1340     Paresh Ring DO at 21 0718              [facility] Medicine Services  PROGRESS NOTE    Patient Name: Rebecca Terrazas  : 1950  MRN: [id number]    Date of Admission: 2021  Primary Care Physician: Delroy Boswell MD    Subjective   Subjective     CC:  Follow-up agitation, urinary retention    HPI:  Predominantly nonverbal, family at bedside states that she is at her approximate baseline.  Ashley catheter removed this morning and has not voided, but has not been eating or drinking much.  No events overnight.    ROS:  Unable to be reliably obtained due to advanced dementia    Objective   Objective     Vital Signs:   Temp:  [97.6 °F (36.4 °C)-98.6 °F (37 °C)] 97.6 °F (36.4 °C)  Heart Rate:  [61-95] 63  Resp:  [14-16] 16  BP: ()/() 141/84     Physical Exam:  Constitutional: Awake, alert, chronically ill-appearing female, appears older than stated age  HENT: NCAT, mucous membranes moist  Respiratory: Clear to auscultation bilaterally, respiratory effort normal   Cardiovascular: RRR, no murmurs, rubs, or gallops, palpable radial pulses  Gastrointestinal: Positive bowel sounds, soft, nontender, nondistended  Musculoskeletal: No bilateral ankle edema  Psychiatric: Nonverbal    Results Reviewed:  LAB RESULTS:      Lab 21  0979  07/31/21  0931   WBC 9.17 13.49*   HEMOGLOBIN 13.4 14.4   HEMATOCRIT 42.0 42.3   PLATELETS 266 318   NEUTROS ABS  --  10.95*   IMMATURE GRANS (ABS)  --  0.05   LYMPHS ABS  --  1.18   MONOS ABS  --  1.20*   EOS ABS  --  0.04   MCV 94.8 89.6   SED RATE  --  38*   PROCALCITONIN  --  0.07         Lab 08/01/21  0447 07/31/21  0931   SODIUM 139 142   POTASSIUM 3.4* 3.4*   CHLORIDE 107 107   CO2 21.0* 20.0*   ANION GAP 11.0 15.0   BUN 8 8   CREATININE 0.76 0.88   GLUCOSE 87 114*   CALCIUM 8.9 9.1   MAGNESIUM  --  1.8   TSH  --  4.470*         Lab 07/31/21  0931   TOTAL PROTEIN 7.6   ALBUMIN 4.00   GLOBULIN 3.6   ALT (SGPT) 9   AST (SGOT) 22   BILIRUBIN 0.6   ALK PHOS 151*         Lab 07/31/21  0931   TROPONIN T 0.017                 Brief Urine Lab Results  (Last result in the past 365 days)      Color   Clarity   Blood   Leuk Est   Nitrite   Protein   CREAT   Urine HCG        07/31/21 0943 Yellow Clear Negative Negative Negative Negative               Microbiology Results Abnormal     Procedure Component Value - Date/Time    COVID-19 and FLU A/B PCR - Swab, Nasopharynx [[id number]]  (Normal) Collected: 07/31/21 0943    Lab Status: Final result Specimen: Swab from Nasopharynx Updated: 07/31/21 1021     COVID19 Not Detected     Influenza A PCR Not Detected     Influenza B PCR Not Detected    Narrative:      Fact sheet for providers: https://www.fda.gov/media/926886/download    Fact sheet for patients: https://www.fda.gov/media/777438/download    Test performed by PCR.          CT Abdomen Pelvis Without Contrast    Result Date: 7/31/2021  EXAMINATION: CT ABDOMEN/PELVIS WO CONTRAST - 07/31/2021  INDICATION: Generalized abdominal pain.  TECHNIQUE: Multiple axial CT imaging is obtained of the abdomen and pelvis without the administration of intravenous contrast.  The radiation dose reduction device was turned on for each scan per the ALARA (As Low as Reasonably Achievable) protocol.  COMPARISON: None.  FINDINGS: There is a  hiatal hernia identified. Portion of the stomach is seen intrathoracically. The lung bases are otherwise grossly clear. The liver is homogeneous in appearance. The spleen is unremarkable. The kidneys and adrenal glands within normal limits. Pancreas is homogeneous. No free fluid or free air. No abnormal mass or fluid collections identified. Diverticulosis of the colon without evidence of diverticulitis. No abdominal or retroperitoneal lymphadenopathy. Small umbilical hernia containing fat only.  PELVIS: The pelvic organs are unremarkable. The pelvic portion gastrointestinal tract within normal limits. Diverticulosis with no evidence of diverticulitis. No free fluid or free air. No abnormal mass or fluid collections identified. Degenerative changes seen within the spine and pelvis.      Impression: There is partial intrathoracic stomach posteriorly on the left with no evidence of acute intra-abdominal or pelvic abnormality. Diverticulosis with no evidence of diverticulitis. Minimal atherosclerotic disease within the abdominal aorta and iliac vessels with degenerative changes seen within the spine and pelvis. Small umbilical hernia containing fat only.  DICTATED:   07/31/2021 EDITED/ls :   07/31/2021      CT Head Without Contrast    Result Date: 7/31/2021  EXAMINATION: CT HEAD WO CONTRAST-  INDICATION: ams mental status change  TECHNIQUE: Multiple axial CT imaging is obtained of the head from skull base of skull vertex without the ministration of intravenous contrast.  The radiation dose reduction device was turned on for each scan per the ALARA (As Low as Reasonably Achievable) protocol.  COMPARISON: NONE  FINDINGS: There is atrophy the brain. Some low-density area seen in the periventricular and subcortical white matter suggesting chronic small vessel ischemic change. There is no hemorrhage or hydrocephalus. There is no mass, mass effect, midline shift. The bony structures reveal no evidence of osseous abnormality.  There is some mucosal thickening involving the left sphenoid sinus. The mastoid air cells are patent.        Impression: Atrophy and chronic changes seen within the brain. No evidence of acute intracranial abnormality.       XR Chest 1 View    Result Date: 7/31/2021   EXAMINATION: XR CHEST 1 VW-  INDICATION: Weak/Dizzy/AMS triage protocol  COMPARISON: NONE  FINDINGS: Portable chest reveals cardiac and mediastinal silhouettes within normal limits. There is a small to moderate size HIDA hernia. The lung fields are grossly clear. No focal right opacification present. A pleural effusion or pneumothorax. Bony structures are unremarkable. Pulmonary vascularity is within normal limits.         Impression: No acute cardiopulmonary disease.             I have reviewed the medications:  Scheduled Meds:busPIRone, 5 mg, Oral, TID  enoxaparin, 40 mg, Subcutaneous, Q24H  QUEtiapine, 50 mg, Oral, Nightly  sodium chloride, 10 mL, Intravenous, Q12H  tamsulosin, 0.4 mg, Oral, Daily      Continuous Infusions:   PRN Meds:.•  acetaminophen  •  LORazepam  •  ondansetron **OR** ondansetron  •  potassium chloride **OR** potassium chloride **OR** potassium chloride  •  sodium chloride  •  sodium chloride    Assessment/Plan   Assessment & Plan     Active Hospital Problems    Diagnosis  POA   • **Acute encephalopathy [G93.40]  Yes   • Hypokalemia [E87.6]  Yes   • Dementia (CMS/HCC) [F03.90]  Yes   • Leukocytosis [D72.829]  Yes   • Acute urinary retention [R33.8]  Yes      Resolved Hospital Problems   No resolved problems to display.        Brief Hospital Course to date:  Rebecca Terrazas is a 70 y.o. female with advanced dementia previously on hospice for 9 months before being discharged from their service who presented to hospital with increased agitation found to be retaining urine with improvement in agitation post placement of Ashley catheter    The following problems new to me today    Assessment/plan    Acute urinary retention  -Voiding  trial today  -Continue Flomax    Advanced dementia with acute agitation  Anxiety  -CT brain without acute process  -Behavior improved after placement of Ashley catheter  -Continue BuSpar, quetiapine  -IVF (poor oral intake)      DVT prophylaxis:  Medical DVT prophylaxis orders are present.       Disposition: I expect the patient to be discharged once patient able to urinate spontaneously.    CODE STATUS:   Code Status and Medical Interventions:   Ordered at: 07/31/21 1517     Code Status:    CPR     Medical Interventions (Level of Support Prior to Arrest):    Full       Paresh Ring DO  08/01/21                Electronically signed by Paresh Ring DO at 08/01/21 1114       Consult Notes (last 72 hours) (Notes from 07/30/21 1541 through 08/02/21 1541)    No notes of this type exist for this encounter.

## 2021-08-02 NOTE — CASE MANAGEMENT/SOCIAL WORK
Continued Stay Note  [facility]     Patient Name: Rebecca Terrazas  MRN: [id number]  Today's Date: 8/2/2021    Admit Date: 7/31/2021    Discharge Plan     Row Name 08/02/21 1502       Plan    Plan  Update    Patient/Family in Agreement with Plan  other (see comments)    Plan Comments  Have noted Hospice consult. I will follow as needed.    Final Discharge Disposition Code  01 - home or self-care        Discharge Codes    No documentation.       Expected Discharge Date and Time     Expected Discharge Date Expected Discharge Time    Aug 3, 2021             Nan Palacios RN

## 2021-08-02 NOTE — CONSULTS
Urology Consult Note    Referring Provider: Paresh Ring DO  Reason for Consultation: Urinary retention    Patient Care Team:  Delroy Boswell MD as PCP - General  Delroy Boswell MD as PCP - Family Medicine    Chief complaint   Chief Complaint   Patient presents with   • Altered Mental Status         Subjective .     History of present illness:    Rebecca Terrazas is a 70 y.o. female who was admitted for Acute encephalopathy [G93.40]. Patient was referred by Dr. Ring for evaluation of Urinary retention. Patient has advanced dementia, chronic immobility, chronic urinary retention.  She is currently taking tamsulosin and bethanechol.  She has not tolerated in and out catheterization.  Ashley catheter has been anchored.  Urology was consulted for recommendations.    Review of Systems  Review of systems could not be obtained due to   patient unresponsive.    History  Past Medical History:   Diagnosis Date   • Anxiety    • Dementia (CMS/HCC)    • Depression    • Hearing loss    • Hyperlipidemia    • Hypertension    • Vertigo    ,   Past Surgical History:   Procedure Laterality Date   • HYSTERECTOMY     ,   Family History   Problem Relation Age of Onset   • Heart disease Father    • Mental illness Sister    • Breast cancer Neg Hx    • Ovarian cancer Neg Hx    ,   Social History     Tobacco Use   • Smoking status: Never Smoker   • Smokeless tobacco: Never Used   Substance Use Topics   • Alcohol use: Yes   • Drug use: No   ,   Medications Prior to Admission   Medication Sig Dispense Refill Last Dose   • estradiol (ESTRACE) 0.5 MG tablet Take 0.5 mg by mouth Daily.      • mirtazapine (REMERON) 30 MG tablet Take 30 mg by mouth Every Night.      • multivitamin with minerals (CENTRUM SILVER 50+WOMEN PO) Take 1 tablet by mouth Daily.      • busPIRone (BUSPAR) 5 MG tablet Take 1 po bid scheduled, may take 1 q4 prn anxiety (Patient taking differently: Take 5 mg by mouth 3 (Three) Times a Day. May take 1 extra tablet q4  prn anxiety) 120 tablet 2 Unknown at Unknown time   • QUEtiapine (SEROquel) 25 MG tablet Take 25 mg by mouth Every Night.   Unknown at Unknown time   , Scheduled Meds:  bethanechol, 10 mg, Oral, TID  busPIRone, 5 mg, Oral, TID  enoxaparin, 40 mg, Subcutaneous, Q24H  [START ON 8/3/2021] QUEtiapine, 25 mg, Oral, Daily  QUEtiapine, 50 mg, Oral, Nightly  sodium chloride, 10 mL, Intravenous, Q12H  tamsulosin, 0.4 mg, Oral, Daily    , Continuous Infusions:   , PRN Meds:  •  acetaminophen  •  lidocaine  •  LORazepam  •  Morphine  •  ondansetron **OR** ondansetron  •  sodium chloride  •  sodium chloride and Allergies:  Erythromycin    Objective     Vital Signs   Temp:  [98.3 °F (36.8 °C)-98.8 °F (37.1 °C)] 98.3 °F (36.8 °C)  Heart Rate:  [] 77  Resp:  [15-18] 18  BP: (116-165)/(68-86) 140/75    Physical Exam:   General Appearance: appears stated age and Unresponsive due to dementia  Abdomen: soft non-tender  Pelvic: Catheter in place        Results Review:   I reviewed the patient's new clinical results.  Lab Results (last 24 hours)     ** No results found for the last 24 hours. **         Imaging Results (Last 24 Hours)     Procedure Component Value Units Date/Time    XR Chest 1 View [[id number]] Collected: 07/31/21 1014     Updated: 08/02/21 0834    Narrative:         EXAMINATION: XR CHEST 1 VW - 07/31/2021     INDICATION: Weakness, dizziness, altered mental status.     COMPARISON: None.     FINDINGS: Portable chest reveals cardiac and mediastinal silhouettes  within normal limits. There is a small to moderate size hiatal hernia.  The lung fields are grossly clear. No focal parenchymal opacification is  present. No pleural effusion or pneumothorax. The bony structures are  unremarkable. The pulmonary vascularity is within normal limits.       Impression:      No acute cardiopulmonary disease.     DICTATED:   07/31/2021  EDITED/ls :   08/01/2021     This report was finalized on 8/2/2021 8:31 AM by Dr. Hardy  MD Rolly.             Labs  Urine Microscopy:       Invalid input(s): LABCAST, Urinalysis:   Results from last 7 days   Lab Units 07/31/21  0943   PH, URINE  6.5   PROTEIN UA  Negative   GLUCOSE UA  Negative   KETONES UA  Trace*   , Urine Culture:   , BMP:   Results from last 7 days   Lab Units 08/01/21 2045 08/01/21 0447 08/01/21 0447 07/31/21  0931 07/31/21  0931   SODIUM mmol/L  --   --  139   < > 142   POTASSIUM mmol/L 3.8   < > 3.4*   < > 3.4*   CALCIUM mg/dL  --   --  8.9   < > 9.1   CHLORIDE mmol/L  --   --  107   < > 107   CO2 mmol/L  --   --  21.0*   < > 20.0*   BUN mg/dL  --   --  8   < > 8   CREATININE mg/dL  --   --  0.76   < > 0.88   ALBUMIN g/dL  --   --   --   --  4.00   BILIRUBIN mg/dL  --   --   --   --  0.6   ALK PHOS U/L  --   --   --   --  151*    < > = values in this interval not displayed.    and CBC:   Results from last 7 days   Lab Units 08/01/21 0447   WBC 10*3/mm3 9.17   RBC 10*6/mm3 4.43   HEMOGLOBIN g/dL 13.4   HEMATOCRIT % 42.0   PLATELETS 10*3/mm3 266       Imaging  none      Assessment   70 y.o. female with Urinary retention      Plan   Continue tamsulosin, bethanechol  Continue indwelling catheter  Patient has been placed on comfort cares      I discussed the patients findings and my recommendations with family    Harshad Bustamante MD  08/02/21  19:30 EDT

## 2021-08-02 NOTE — PLAN OF CARE
Problem: Adult Inpatient Plan of Care  Goal: Plan of Care Review  Outcome: Ongoing, Progressing  Flowsheets (Taken 8/2/2021 1735)  Outcome Summary: Pt less agitated today, remy anchored this morning and in place. Palliative/hospice consulted. Comfort measures only. VSS. no complaints at this time.  Goal: Patient-Specific Goal (Individualized)  Outcome: Ongoing, Progressing  Goal: Absence of Hospital-Acquired Illness or Injury  Outcome: Ongoing, Progressing  Intervention: Identify and Manage Fall Risk  Recent Flowsheet Documentation  Taken 8/2/2021 1600 by Sylvia Campbell, RN  Safety Promotion/Fall Prevention:   activity supervised   assistive device/personal items within reach   clutter free environment maintained   nonskid shoes/slippers when out of bed   room organization consistent   safety round/check completed  Taken 8/2/2021 1400 by Sylvia Campbell, RN  Safety Promotion/Fall Prevention:   activity supervised   assistive device/personal items within reach   clutter free environment maintained   nonskid shoes/slippers when out of bed   room organization consistent   safety round/check completed  Taken 8/2/2021 1200 by Sylvia Campbell, RN  Safety Promotion/Fall Prevention:   activity supervised   assistive device/personal items within reach   clutter free environment maintained   nonskid shoes/slippers when out of bed   room organization consistent   safety round/check completed  Taken 8/2/2021 1000 by Sylvia Campbell, RN  Safety Promotion/Fall Prevention:   activity supervised   assistive device/personal items within reach   clutter free environment maintained   nonskid shoes/slippers when out of bed   room organization consistent   safety round/check completed  Taken 8/2/2021 0815 by Sylvia Campbell, RN  Safety Promotion/Fall Prevention:   activity supervised   assistive device/personal items within reach   clutter free environment maintained   fall prevention program maintained   gait belt   nonskid  shoes/slippers when out of bed   room organization consistent   safety round/check completed  Intervention: Prevent Skin Injury  Recent Flowsheet Documentation  Taken 8/2/2021 1600 by Sylvia Campbell RN  Body Position: position changed independently  Skin Protection:   adhesive use limited   skin sealant/moisture barrier applied   skin-to-device areas padded   skin-to-skin areas padded   tubing/devices free from skin contact  Taken 8/2/2021 1400 by Sylvia Campbell RN  Body Position: position changed independently  Skin Protection:   adhesive use limited   skin sealant/moisture barrier applied   skin-to-skin areas padded   skin-to-device areas padded   tubing/devices free from skin contact  Taken 8/2/2021 1200 by Sylvia Campbell RN  Body Position: position changed independently  Skin Protection:   adhesive use limited   skin sealant/moisture barrier applied   skin-to-device areas padded   skin-to-skin areas padded   tubing/devices free from skin contact  Taken 8/2/2021 1000 by Sylvia Campbell RN  Body Position: position changed independently  Skin Protection:   adhesive use limited   skin sealant/moisture barrier applied   skin-to-device areas padded   skin-to-skin areas padded   tubing/devices free from skin contact  Taken 8/2/2021 0815 by Sylvia Campbell RN  Body Position:   position changed independently   weight shift assistance provided  Skin Protection:   adhesive use limited   incontinence pads utilized   skin sealant/moisture barrier applied   skin-to-device areas padded   skin-to-skin areas padded   tubing/devices free from skin contact  Intervention: Prevent and Manage VTE (venous thromboembolism) Risk  Recent Flowsheet Documentation  Taken 8/2/2021 0815 by Sylvia Campbell RN  VTE Prevention/Management:   bilateral   dorsiflexion/plantar flexion performed  Intervention: Prevent Infection  Recent Flowsheet Documentation  Taken 8/2/2021 1600 by Sylvia Campbell RN  Infection Prevention:   single patient  room provided   visitors restricted/screened   rest/sleep promoted   hand hygiene promoted  Taken 8/2/2021 1400 by Sylvia Campbell RN  Infection Prevention:   visitors restricted/screened   single patient room provided   rest/sleep promoted   hand hygiene promoted  Taken 8/2/2021 1200 by Sylvia Campbell RN  Infection Prevention:   visitors restricted/screened   single patient room provided   rest/sleep promoted   hand hygiene promoted  Taken 8/2/2021 1000 by Sylvia Campbell RN  Infection Prevention:   visitors restricted/screened   single patient room provided   rest/sleep promoted   hand hygiene promoted  Taken 8/2/2021 0815 by Sylvia Campbell RN  Infection Prevention:   visitors restricted/screened   single patient room provided   rest/sleep promoted   hand hygiene promoted  Goal: Optimal Comfort and Wellbeing  Outcome: Ongoing, Progressing  Intervention: Provide Person-Centered Care  Recent Flowsheet Documentation  Taken 8/2/2021 0815 by Sylvia Campbell RN  Trust Relationship/Rapport:   care explained   choices provided   questions answered   reassurance provided   thoughts/feelings acknowledged  Goal: Readiness for Transition of Care  Outcome: Ongoing, Progressing     Problem: Fall Injury Risk  Goal: Absence of Fall and Fall-Related Injury  Outcome: Ongoing, Progressing  Intervention: Identify and Manage Contributors to Fall Injury Risk  Recent Flowsheet Documentation  Taken 8/2/2021 0815 by Sylvia Campbell RN  Medication Review/Management: medications reviewed  Intervention: Promote Injury-Free Environment  Recent Flowsheet Documentation  Taken 8/2/2021 1600 by Sylvia Campbell RN  Safety Promotion/Fall Prevention:   activity supervised   assistive device/personal items within reach   clutter free environment maintained   nonskid shoes/slippers when out of bed   room organization consistent   safety round/check completed  Taken 8/2/2021 1400 by Sylvia Campbell RN  Safety Promotion/Fall Prevention:    activity supervised   assistive device/personal items within reach   clutter free environment maintained   nonskid shoes/slippers when out of bed   room organization consistent   safety round/check completed  Taken 8/2/2021 1200 by Sylvia Campbell RN  Safety Promotion/Fall Prevention:   activity supervised   assistive device/personal items within reach   clutter free environment maintained   nonskid shoes/slippers when out of bed   room organization consistent   safety round/check completed  Taken 8/2/2021 1000 by Sylvia Campbell RN  Safety Promotion/Fall Prevention:   activity supervised   assistive device/personal items within reach   clutter free environment maintained   nonskid shoes/slippers when out of bed   room organization consistent   safety round/check completed  Taken 8/2/2021 0815 by Sylvia Campbell RN  Safety Promotion/Fall Prevention:   activity supervised   assistive device/personal items within reach   clutter free environment maintained   fall prevention program maintained   gait belt   nonskid shoes/slippers when out of bed   room organization consistent   safety round/check completed     Problem: Skin Injury Risk Increased  Goal: Skin Health and Integrity  Outcome: Ongoing, Progressing  Intervention: Optimize Skin Protection  Recent Flowsheet Documentation  Taken 8/2/2021 1600 by Sylvia Campbell RN  Pressure Reduction Techniques:   frequent weight shift encouraged   heels elevated off bed   positioned off wounds   pressure points protected  Head of Bed (HOB): HOB at 30-45 degrees  Pressure Reduction Devices:   pressure-redistributing mattress utilized   positioning supports utilized   heel offloading device utilized  Skin Protection:   adhesive use limited   skin sealant/moisture barrier applied   skin-to-device areas padded   skin-to-skin areas padded   tubing/devices free from skin contact  Taken 8/2/2021 1400 by Sylvia Campbell RN  Pressure Reduction Techniques:   frequent weight shift  encouraged   heels elevated off bed   positioned off wounds   pressure points protected  Head of Bed (HOB): HOB at 30-45 degrees  Pressure Reduction Devices:   pressure-redistributing mattress utilized   positioning supports utilized   heel offloading device utilized  Skin Protection:   adhesive use limited   skin sealant/moisture barrier applied   skin-to-skin areas padded   skin-to-device areas padded   tubing/devices free from skin contact  Taken 8/2/2021 1200 by Sylvia Campbell RN  Pressure Reduction Techniques:   frequent weight shift encouraged   heels elevated off bed   positioned off wounds   pressure points protected  Head of Bed (HOB): HOB at 30-45 degrees  Pressure Reduction Devices:   pressure-redistributing mattress utilized   positioning supports utilized   heel offloading device utilized  Skin Protection:   adhesive use limited   skin sealant/moisture barrier applied   skin-to-device areas padded   skin-to-skin areas padded   tubing/devices free from skin contact  Taken 8/2/2021 1000 by Sylvia Campbell RN  Pressure Reduction Techniques:   frequent weight shift encouraged   heels elevated off bed   positioned off wounds   pressure points protected  Head of Bed (HOB): HOB at 30-45 degrees  Pressure Reduction Devices:   pressure-redistributing mattress utilized   positioning supports utilized   heel offloading device utilized  Skin Protection:   adhesive use limited   skin sealant/moisture barrier applied   skin-to-device areas padded   skin-to-skin areas padded   tubing/devices free from skin contact  Taken 8/2/2021 0815 by Sylvia Campbell RN  Pressure Reduction Techniques:   frequent weight shift encouraged   heels elevated off bed   positioned off wounds   pressure points protected  Head of Bed (HOB): HOB at 30-45 degrees  Pressure Reduction Devices:   positioning supports utilized   pressure-redistributing mattress utilized   heel offloading device utilized  Skin Protection:   adhesive use  limited   incontinence pads utilized   skin sealant/moisture barrier applied   skin-to-device areas padded   skin-to-skin areas padded   tubing/devices free from skin contact   Goal Outcome Evaluation:              Outcome Summary: Pt less agitated today, remy anchored this morning and in place. Palliative/hospice consulted. Comfort measures only. VSS. no complaints at this time.

## 2021-08-02 NOTE — PLAN OF CARE
Problem: Adult Inpatient Plan of Care  Goal: Plan of Care Review  Outcome: Ongoing, Progressing   Goal Outcome Evaluation:   Notified by family that patients was pulling at IV and monitor. Bladder scanned with 459 mL, stright cath performed with 500 mL out, 0mL post residual. Pt became tachacardic and very agitated during/after stright cath demonstrated by hitting staff, attempting to get out of bed, and waving arms in the air dispite PRN Ativan. Pt is now resting in bed on RA. No output since stright cath performed. Bladder scanned resulting with 166 mL.  Pt s family has concerins reguarding retention once she is discharged. Will continue to monitor.

## 2021-08-02 NOTE — PROGRESS NOTES
Continued Stay Note  [facility]     Patient Name: Rebecca Terrazas  MRN: [id number]  Today's Date: 8/2/2021    Admit Date: 7/31/2021    Discharge Plan     Row Name 08/02/21 1532       Plan    Plan Home with [facility]    Patient/Family in Agreement with Plan yes    Plan Comments  Hospice consult received. Chart reviewed. Spoke with daughter, Ella, @ [facility] & spouse via phone. Both state that they understand a hospice POC/Philosophy/services as the pt. had hospice in the past. Both state that they would like a comfort POC @ d/c & home hospice services. Pt. will require an ambulance @ d/c., we will request [facility] to transport. Pt. will need a hospital bed, OBT, transport w/c. Daughter states that they will start to make room for hospice DME delivery. Daughter states that she thinks they can be ready for the delivery by Wednesday or Thursday. Private duty agency list also provided to the family. They are aware that this will be private pay. Referral has been sent to Central Intake. Pt will likely require a [id number]. Hospice will continue to follow. Please call [phone], if we can be of further assistance.    Row Name 08/02/21 1502                                   Discharge Codes    No documentation.       Expected Discharge Date and Time     Expected Discharge Date Expected Discharge Time    Aug 3, 2021             Rashmi Jennings

## 2021-08-03 NOTE — PROGRESS NOTES
"Palliative Care Progress Note    Date of Admission: 7/31/2021    Code Status:   Current Code Status     Date Active Code Status Order ID Comments User Context       8/2/2021 1425 No CPR [id number]  Nhi Yun, ANTONELLA Inpatient     Advance Care Planning Activity      Questions for Current Code Status     Question Answer Comment    Code Status No CPR     Medical Interventions (Level of Support Prior to Arrest) Comfort Measures     Comments per daughter     Level Of Support Discussed With Next of Kin (If No Surrogate)         Subjective: patient has required increase morphine doses today.   prns yesterday cumulative: x2 lorazepam and x2 morphine   at bedside, reports patient was able to take po med for nursing while he has been at bedside.   Reports at home he was able to crush medicines and administer some of her medications. He reports that over the last 2 weeks he was having more difficulties with managing the patient at home.  Patient having diminished po intake, orange juice today and applesauce with medications  Reviewed current scheduled and prn medications for route, type, dose, and frequency. Reviewed medical record     •  acetaminophen  •  bisacodyl  •  lidocaine  •  LORazepam  •  LORazepam  •  morphine  •  Morphine  •  ondansetron **OR** ondansetron  •  sodium chloride  •  sodium chloride    Objective:  PPS 30%  /85 (BP Location: Left arm, Patient Position: Lying)   Pulse 111   Temp 98.8 °F (37.1 °C) (Oral)   Resp 22   Ht 160 cm (62.99\")   Wt 59.3 kg (130 lb 12.8 oz)   LMP  (LMP Unknown)   SpO2 95%   BMI 23.18 kg/m²    Intake & Output (last day)       08/03 0701 - 08/04 0700    Urine (mL/kg/hr) 450 (0.6)    Total Output 450    Net -450             Lab Results (last 24 hours)     ** No results found for the last 24 hours. **        Imaging Results (Last 24 Hours)     Procedure Component Value Units Date/Time    CT Abdomen Pelvis Without Contrast [[id number]] Collected: 07/31/21 1345     " Updated: 08/03/21 1051    Narrative:      EXAMINATION: CT ABDOMEN/PELVIS WO CONTRAST - 07/31/2021     INDICATION: Generalized abdominal pain.     TECHNIQUE: Multiple axial CT imaging is obtained of the abdomen and  pelvis without the administration of intravenous contrast.     The radiation dose reduction device was turned on for each scan per the  ALARA (As Low as Reasonably Achievable) protocol.     COMPARISON: None.     FINDINGS: There is a hiatal hernia identified. Portion of the stomach is  seen intrathoracically. The lung bases are otherwise grossly clear. The  liver is homogeneous in appearance. The spleen is unremarkable. The  kidneys and adrenal glands within normal limits. Pancreas is  homogeneous. No free fluid or free air. No abnormal mass or fluid  collections identified. Diverticulosis of the colon without evidence of  diverticulitis. No abdominal or retroperitoneal lymphadenopathy. Small  umbilical hernia containing fat only.     PELVIS: The pelvic organs are unremarkable. The pelvic portion  gastrointestinal tract within normal limits. Diverticulosis with no  evidence of diverticulitis. No free fluid or free air. No abnormal mass  or fluid collections identified. Degenerative changes seen within the  spine and pelvis.       Impression:      There is partial intrathoracic stomach posteriorly on the  left with no evidence of acute intra-abdominal or pelvic abnormality.  Diverticulosis with no evidence of diverticulitis. Minimal  atherosclerotic disease within the abdominal aorta and iliac vessels  with degenerative changes seen within the spine and pelvis. Small  umbilical hernia containing fat only.     DICTATED:   07/31/2021  EDITED/ls :   07/31/2021     This report was finalized on 8/3/2021 10:48 AM by Dr. Hayley Lofton MD.       CT Head Without Contrast [[id number]] Collected: 07/31/21 1341     Updated: 08/03/21 1051    Narrative:      EXAMINATION: CT HEAD WO CONTRAST - 07/31/2021      INDICATION: Mental status change.     TECHNIQUE: Multiple axial CT imaging was obtained of the head from skull  base to skull vertex without the administration of intravenous contrast.     The radiation dose reduction device was turned on for each scan per the  ALARA (As Low as Reasonably Achievable) protocol.     COMPARISON: None.     FINDINGS: There is atrophy of the brain. Some low-density area seen in  the periventricular and subcortical white matter suggesting chronic  small vessel ischemic change. There is no hemorrhage or hydrocephalus.  There is no mass, mass effect, or midline shift. The bony structures  reveal no evidence of osseous abnormality. There is some mucosal  thickening involving the left sphenoid sinus. The mastoid air cells are  patent.       Impression:      Atrophy and chronic changes seen within the brain. No  evidence of acute intracranial abnormality.     DICTATED:   07/31/2021  EDITED/ls :   08/01/2021        This report was finalized on 8/3/2021 10:48 AM by Dr. Hayley Lofton MD.           Physical Exam:  Gen: NAD, resting in bed  Skin: warm, dry   HEENT: oropharynx clear  Resp/thorax: even effort, nonlabored, CTA   CV: RRR   ABD: soft, bowel sounds +, nontender  : remy to bedside drainage with clear gonzalez urine  Ext: no edema, no redness   Neuro: somnolent, no myoclonus     Reviewed labs and diagnostic results.   No results found for: HGBA1C  Results from last 7 days   Lab Units 08/01/21 0447   WBC 10*3/mm3 9.17   HEMOGLOBIN g/dL 13.4   HEMATOCRIT % 42.0   PLATELETS 10*3/mm3 266     Results from last 7 days   Lab Units 08/01/21 2045 08/01/21 0447 08/01/21 0447 07/31/21  0931 07/31/21  0931   SODIUM mmol/L  --   --  139   < > 142   POTASSIUM mmol/L 3.8   < > 3.4*   < > 3.4*   CHLORIDE mmol/L  --   --  107   < > 107   CO2 mmol/L  --   --  21.0*   < > 20.0*   BUN mg/dL  --   --  8   < > 8   CREATININE mg/dL  --   --  0.76   < > 0.88   CALCIUM mg/dL  --   --  8.9   < > 9.1    BILIRUBIN mg/dL  --   --   --   --  0.6   ALK PHOS U/L  --   --   --   --  151*   ALT (SGPT) U/L  --   --   --   --  9   AST (SGOT) U/L  --   --   --   --  22   GLUCOSE mg/dL  --   --  87   < > 114*    < > = values in this interval not displayed.       Impression: 70 y.o. female with advanced dementia, urinary retention    Plan:   Restlessness, agitation - prn lorazepam, morphine  Provided oral formulations for home discharge.   Continue to monitor for dose effect    Goals of care discussion - spoke to  at bedside, reviewed transition to comfort focused plan of care and expected changes at home, answered questions and provide support.  is in agreement with plan of care and being supportive of patient at home as much as possible.    is familiar with having home hospice previously.   Time: 40 minutes   > 50% time spent in counseling and education concerning current orders for symptom management with     Nhi Yun, ANTONELLA  [phone]  08/03/21  19:06 EDT

## 2021-08-03 NOTE — PLAN OF CARE
Problem: Adult Inpatient Plan of Care  Goal: Plan of Care Review  Outcome: Ongoing, Progressing   Goal Outcome Evaluation:   VSS on RA. Daughter at bedside. Daughter refused some medication, CHG, rmey care, 2000, and 0000 vitals during this shift. Daughter has many concerns regarding patient care when discharged. Will continue to monitor.

## 2021-08-03 NOTE — PROGRESS NOTES
Continued Stay Note  [facility]     Patient Name: Rebecca Terrazas  MRN: [id number]  Today's Date: 8/3/2021    Admit Date: 7/31/2021    Discharge Plan     Row Name 08/03/21 1543       Plan    Plan Home with [facility]    Plan Comments Pt.’s ambulance is scheduled for 1130 on 8/5/21 with [facility] ambulance.    Row Name 08/03/21 1514              Discharge Codes    No documentation.             Rashmi Jennings

## 2021-08-03 NOTE — PLAN OF CARE
Goal Outcome Evaluation:  Plan of Care Reviewed With: daughter        Progress: no change  Outcome Summary: Palliative consullt for GOC/ACP, symptom management, support to pt and family; seen by JANETT BERMAN 8/2/21. Pt is less agitated today per documentation. Hospice Liaison present with daughter at time of Palliative RN encounter; pt sleeping, opened eyes briefly to daughter's voice, no observable signs of discomfort. Plan is for home tomorrow with hospice.    1300 Palliative Team Conference: NIRMAL Sanchez RN, [facility]; LENORE Bautista DO; JANETT Yun, ANTONELLA; TEGAN Yeboah, [facility], [facility]; PADMAJA Jennings, [facility]; PADMAJA Dean, RN    Problem: Palliative Care  Goal: Enhanced Quality of Life  Outcome: Ongoing, Progressing  Intervention: Maximize Comfort  Flowsheets (Taken 8/3/2021 1555)  Pain Management Interventions: pain management plan reviewed with patient/caregiver  Intervention: Optimize Function  Flowsheets (Taken 8/3/2021 1557)  Sensory Stimulation Regulation: quiet environment promoted  Sleep/Rest Enhancement:   consistent schedule promoted   natural light exposure provided  Intervention: Promote Advance Care Planning  Flowsheets (Taken 8/3/2021 1557)  Life Transition/Adjustment: (Hospice following for home services at discharge)   palliative care discussed   palliative care initiated   other (see comments)  Intervention: Optimize Psychosocial Wellbeing  Flowsheets (Taken 8/3/2021 1557)  Grieving Process Facilitation: reaction to loss explored  Spiritual Activities Assistance: (Spiritual Care consult) other (see comments)

## 2021-08-03 NOTE — PLAN OF CARE
Goal Outcome Evaluation:  Plan of Care Reviewed With: patient      Pt VSS on RA. PRN morphine and Ativan given for patient comfort. Family has been at bedside and very attentive to pt. Pt has been able to eat minimal amounts of liquids this shift.

## 2021-08-03 NOTE — PROGRESS NOTES
Continued Stay Note  [facility]     Patient Name: Rebecca Terrazas  MRN: [id number]  Today's Date: 8/3/2021    Admit Date: 7/31/2021    Discharge Plan     Row Name 08/03/21 1514       Plan    Plan Home with [facility]    Patient/Family in Agreement with Plan yes    Plan Comments F/u visit made with spouse & daughter. Both are in agreement to use Meds2 bed @ [facility] prior to d/c. Palliative care aware & will address comfort meds for d/c. Additional questions/concerns r/t hospice & a comfort POC answered. Pt will d/c home on 8/5/21 via ambulance with [facility]. Please call [phone], if we can be of further assistance.    Row Name 08/03/21 1406                    Discharge Codes    No documentation.             Rashmi Jennings

## 2021-08-03 NOTE — CASE MANAGEMENT/SOCIAL WORK
Continued Stay Note  [facility]     Patient Name: Rebecca Terrazas  MRN: [id number]  Today's Date: 8/3/2021    Admit Date: 7/31/2021    Discharge Plan     Row Name 08/03/21 1406       Plan    Plan Comments  I spoke with the pts daughter and spouse. Have asked financial counseling to send info on possible assistance with the hospital bill to the pts home.        Discharge Codes    No documentation.             Nan Palacios RN

## 2021-08-03 NOTE — PROGRESS NOTES
[facility] Medicine Services  PROGRESS NOTE    Patient Name: Rebecca Terrazas  : 1950  MRN: [id number]    Date of Admission: 2021  Primary Care Physician: Delroy Boswell MD    Subjective   Subjective     CC:  Follow-up agitation    HPI:  Patient remains nonverbal.  Daughter at bedside attentive continues, concerned that 1 mg morphine is not adequately controlling pain/discomfort.  Multiple visits made to bedside per family request,  arriving later in the day also requesting discussions.    ROS:  Unable to be reliably obtained due to patient's advanced dementia    Objective   Objective     Vital Signs:   Temp:  [98.8 °F (37.1 °C)] 98.8 °F (37.1 °C)  Heart Rate:  [] 111  Resp:  [15-22] 22  BP: (126-173)/(75-85) 173/85     Physical Exam:  Constitutional: Elderly female laying in bed with eyes closed, moans occasionally, otherwise noninteractive  HENT: NCAT, mucous membranes moist  Respiratory: Clear to auscultation bilaterally, respiratory effort normal   Cardiovascular: RRR, no murmurs, rubs, or gallops, palpable radial pulses  Gastrointestinal: Positive bowel sounds, soft, nontender, nondistended  Musculoskeletal: No bilateral ankle edema  Psychiatric: Nonverbal    Results Reviewed:  LAB RESULTS:      Lab 21  0931   WBC 9.17 13.49*   HEMOGLOBIN 13.4 14.4   HEMATOCRIT 42.0 42.3   PLATELETS 266 318   NEUTROS ABS  --  10.95*   IMMATURE GRANS (ABS)  --  0.05   LYMPHS ABS  --  1.18   MONOS ABS  --  1.20*   EOS ABS  --  0.04   MCV 94.8 89.6   SED RATE  --  38*   PROCALCITONIN  --  0.07         Lab 21  0931   SODIUM  --  139 142   POTASSIUM 3.8 3.4* 3.4*   CHLORIDE  --  107 107   CO2  --  21.0* 20.0*   ANION GAP  --  11.0 15.0   BUN  --  8 8   CREATININE  --  0.76 0.88   GLUCOSE  --  87 114*   CALCIUM  --  8.9 9.1   MAGNESIUM  --   --  1.8   TSH  --   --  4.470*         Lab 21  0931   TOTAL PROTEIN 7.6    ALBUMIN 4.00   GLOBULIN 3.6   ALT (SGPT) 9   AST (SGOT) 22   BILIRUBIN 0.6   ALK PHOS 151*         Lab 07/31/21  0931   TROPONIN T 0.017                 Brief Urine Lab Results  (Last result in the past 365 days)      Color   Clarity   Blood   Leuk Est   Nitrite   Protein   CREAT   Urine HCG        07/31/21 0943 Yellow Clear Negative Negative Negative Negative               Microbiology Results Abnormal     Procedure Component Value - Date/Time    COVID-19 and FLU A/B PCR - Swab, Nasopharynx [[id number]]  (Normal) Collected: 07/31/21 0943    Lab Status: Final result Specimen: Swab from Nasopharynx Updated: 07/31/21 1021     COVID19 Not Detected     Influenza A PCR Not Detected     Influenza B PCR Not Detected    Narrative:      Fact sheet for providers: https://www.fda.gov/media/640561/download    Fact sheet for patients: https://www.fda.gov/media/218994/download    Test performed by PCR.          No radiology results from the last 24 hrs        I have reviewed the medications:  Scheduled Meds:bethanechol, 10 mg, Oral, TID  busPIRone, 5 mg, Oral, TID  QUEtiapine, 25 mg, Oral, Daily  QUEtiapine, 50 mg, Oral, Nightly  sodium chloride, 10 mL, Intravenous, Q12H  tamsulosin, 0.4 mg, Oral, Daily      Continuous Infusions:   PRN Meds:.•  acetaminophen  •  bisacodyl  •  lidocaine  •  LORazepam  •  morphine  •  Morphine  •  ondansetron **OR** ondansetron  •  sodium chloride  •  sodium chloride    Assessment/Plan   Assessment & Plan     Active Hospital Problems    Diagnosis  POA   • **Acute encephalopathy [G93.40]  Yes   • Hypokalemia [E87.6]  Yes   • Dementia (CMS/HCC) [F03.90]  Yes   • Leukocytosis [D72.829]  Yes   • Acute urinary retention [R33.8]  Yes      Resolved Hospital Problems   No resolved problems to display.        Brief Hospital Course to date:  Rebecca Terrazas is a 70 y.o. female with advanced dementia who was previously been on hospice for 9 months before being discharged from their service who presented to  the hospital with family concerns for increased agitation.  In the ED she was found to be retaining urine with rapid improvement in symptoms after having Ashley catheter placed.  She has undergone a spontaneous voiding trial, however due to her general condition she is unable to transfer to a bedside commode for scheduled/prompting voiding requiring replacement of Ashley catheter for symptom relief.  Family requested urology consultation despite explanation that there is nothing acutely to be done in the hospital, urology recommended leave the Ashley in place.  Palliative care was reconsulted who spoke with the family and subsequently consulted hospice who are following along to help manage medications for symptoms and coordinate disposition home with hospice services    Assessment/plan    Advanced dementia with acute agitation  Anxiety  -CT brain without acute process  -Behavior initially improved after placement of Ashley catheter  -Continue BuSpar  -Continue split dose of Seroquel  -Palliative/hospice following, patient now comfort measures and plan for discharge home with hospice  -Continue as needed morphine, Ativan  -3 separate visits made to bedside to assist with discharge planning, education, answering questions for patient's daughter and     Acute urinary retention  Chronic immobility  -Continue Flomax, bethanechol  -Per family request urology was consulted, recommended continue Ashley catheter and above medicines     DVT prophylaxis:  Mechanical DVT prophylaxis orders are present.       Disposition: Clinical status is stable, hospice arranging home DME, anticipate discharge date of 8/5/2021 with home hospice.    CODE STATUS:   Code Status and Medical Interventions:   Ordered at: 08/02/21 5011     Level Of Support Discussed With:    Next of Kin (If No Surrogate)     Code Status:    No CPR     Medical Interventions (Level of Support Prior to Arrest):    Comfort Measures     Comments:    per daughter        Paresh Ring, DO  08/03/21    Prolonged Care Time     >65 minute hospital f/u encounter:  >50% of time spent counseling on current illness and plan of care. Case discussed with: Patient's daughter and   Total time spent face to face with the patient was >30 minutes.  Total time of the encounter was 65 minutes.

## 2021-08-03 NOTE — PROGRESS NOTES
Continued Stay Note  [facility]     Patient Name: Rebecca Terrazas  MRN: [id number]  Today's Date: 8/3/2021    Admit Date: 7/31/2021    Discharge Plan     Row Name 08/03/21 0948       Plan    Plan Home with [facility]    Patient/Family in Agreement with Plan yes    Plan Comments Spoke with Hospice DME, Marilyn, & asked that they deliver the pt.’s DME tomorrow. Hospice DME will reach out the pt.’s daughter, Ella, to coordinate delivery. Spoke with daughter, Ella, this morning who states that her goal @ d/c is that the pt. will be on a medication regimen which can be done it the home setting preventing the pt. from returning to the hospital as well as not making the pt. drowsy. Writer explained that Palliative Care is following to help address these concerns for a home d/c with hospice, so that the pt. does not need to return to the hospital. Ella verbalized understanding. Palliative Care has been updated. Hospice will continue to follow. Please call [phone], if we can be of further assistance.    Row Name 08/03/21 0812       Plan    Final Discharge Disposition Code  50 - home with hospice        Discharge Codes    No documentation.             Rashmi Jennings

## 2021-08-04 PROBLEM — G31.1 SENILE DEGENERATION OF BRAIN (HCC): Status: ACTIVE | Noted: 2021-01-01

## 2021-08-04 NOTE — INTERVAL H&P NOTE
Pt admitted to [facility] on 8/4/2021. Please see Hospice documentation for further information.

## 2021-08-04 NOTE — SIGNIFICANT NOTE
Continued Stay Note  [facility]     Patient Name: Rebecca Terrazas  MRN: [id number]  Today's Date: 8/4/2021    Admit Date: 8/4/2021    Discharge Plan       Row Name 08/04/21 1837       Plan    Plan  [facility] hospice admission  (Pended)     Plan Comments  Rebecca Terrazas is a 70 y.o.  female who presents to [facility] on 7/31 with altered mental status and agitation. She was previously on home hospice for 6months last year (April 2020) but did well and was discharged (Oct 2020) she was being followed by our Palliative team. PMHx:  of dementia (per dtr this has been going on for nearly 13 years) Hypokalemia, leukocytosis, acute urinary retention, anxiety/depression. During the past 3 days past has continued to decline, no longer eating or drinking x48hrs, nonverbal, having increased pain. On assessment pt noted to have pain 6/10 on PAINAD, 10 sec periods of apnea, lower extremities cool to touch. Pt admitted to inpt hospice on this date 8/4 with a diagnosis of senile degeneration of the brain. PPS10%.  and dtr at bedside with lots of concerns about pts comfort, extensive education done on EOL and what hospice inpt services offer. Spoke to staff RN about med changes. Pt is requiring GIP for skilled nursing assessment of meds given, for ADLs and interventions to promote comfort. Disposition: patient not expected to survive this admission. Safety measures in place. Will continue to monitor.     Final Note            Discharge Codes    No documentation.             Candance Disney, RN

## 2021-08-04 NOTE — PROGRESS NOTES
Continued Stay Note  [facility]     Patient Name: Rebecca Terrazas  MRN: [id number]  Today's Date: 8/4/2021    Admit Date: 7/31/2021    Discharge Plan     Row Name 08/04/21 1253       Plan    Plan Home with [facility] vs In pt. Hospice    Patient/Family in Agreement with Plan yes    Plan Comments Visit made with spouse/daughter @ [facility]. Pt. has had a change in status & does appear that she may be in pt. hospice appropriate. Writer spoke with family about the fact that with the change is status that if they are agreeable, in pt. hospice could be asked to come to assess the pt. Spouse/daughter agreeable. Spoke with [facility] RN made aware & ask about the placement of a PCA. Writer did alert Palliative Care of the in pt. referral & that the RN asked about a PCA for the pt. In pt. hospice, Edda, notified. Hospice will continue to follow.        Discharge Codes    No documentation.             Rashmi Jennings

## 2021-08-04 NOTE — PROGRESS NOTES
[facility] Medicine Services  PROGRESS NOTE    Patient Name: Rebecca Terrazas  : 1950  MRN: [id number]    Date of Admission: 2021  Primary Care Physician: Delroy Boswell MD    Subjective   Subjective     CC:  Follow-up agitation    HPI:  Sleeping, no family at bedside.    ROS:  Unable to be reliably obtained due to patient's advanced dementia    Objective   Objective     Vital Signs:   Temp:  [98.8 °F (37.1 °C)] 98.8 °F (37.1 °C)  Heart Rate:  [111] 111  Resp:  [22] 22  BP: (173)/(85) 173/85     Physical Exam:  Constitutional: Elderly female laying in bed with eyes closed, moans occasionally, otherwise noninteractive  HENT: NCAT, mucous membranes moist  Respiratory: Clear to auscultation bilaterally, respiratory effort normal   Cardiovascular: RRR, no murmurs, rubs, or gallops, palpable radial pulses  Gastrointestinal: Positive bowel sounds, soft, nontender, nondistended  Musculoskeletal: No bilateral ankle edema  Psychiatric: Nonverbal    Results Reviewed:  LAB RESULTS:      Lab 21  0931   WBC 9.17 13.49*   HEMOGLOBIN 13.4 14.4   HEMATOCRIT 42.0 42.3   PLATELETS 266 318   NEUTROS ABS  --  10.95*   IMMATURE GRANS (ABS)  --  0.05   LYMPHS ABS  --  1.18   MONOS ABS  --  1.20*   EOS ABS  --  0.04   MCV 94.8 89.6   SED RATE  --  38*   PROCALCITONIN  --  0.07         Lab 21  0931   SODIUM  --  139 142   POTASSIUM 3.8 3.4* 3.4*   CHLORIDE  --  107 107   CO2  --  21.0* 20.0*   ANION GAP  --  11.0 15.0   BUN  --  8 8   CREATININE  --  0.76 0.88   GLUCOSE  --  87 114*   CALCIUM  --  8.9 9.1   MAGNESIUM  --   --  1.8   TSH  --   --  4.470*         Lab 21  0931   TOTAL PROTEIN 7.6   ALBUMIN 4.00   GLOBULIN 3.6   ALT (SGPT) 9   AST (SGOT) 22   BILIRUBIN 0.6   ALK PHOS 151*         Lab 21  0931   TROPONIN T 0.017                 Brief Urine Lab Results  (Last result in the past 365 days)      Color   Clarity   Blood    Leuk Est   Nitrite   Protein   CREAT   Urine HCG        07/31/21 0943 Yellow Clear Negative Negative Negative Negative               Microbiology Results Abnormal     Procedure Component Value - Date/Time    COVID-19 and FLU A/B PCR - Swab, Nasopharynx [[id number]]  (Normal) Collected: 07/31/21 0943    Lab Status: Final result Specimen: Swab from Nasopharynx Updated: 07/31/21 1021     COVID19 Not Detected     Influenza A PCR Not Detected     Influenza B PCR Not Detected    Narrative:      Fact sheet for providers: https://www.fda.gov/media/508812/download    Fact sheet for patients: https://www.fda.gov/media/840517/download    Test performed by PCR.          No radiology results from the last 24 hrs        I have reviewed the medications:  Scheduled Meds:bethanechol, 10 mg, Oral, TID  bisacodyl, 10 mg, Rectal, Daily  busPIRone, 5 mg, Oral, TID  QUEtiapine, 25 mg, Oral, Daily  QUEtiapine, 50 mg, Oral, Nightly  sodium chloride, 10 mL, Intravenous, Q12H  tamsulosin, 0.4 mg, Oral, Daily      Continuous Infusions:   PRN Meds:.•  acetaminophen  •  bisacodyl  •  haloperidol lactate  •  lidocaine  •  LORazepam  •  LORazepam  •  morphine  •  Morphine  •  ondansetron **OR** ondansetron  •  sodium chloride  •  sodium chloride    Assessment/Plan   Assessment & Plan     Active Hospital Problems    Diagnosis  POA   • **Acute encephalopathy [G93.40]  Yes   • Hypokalemia [E87.6]  Yes   • Dementia (CMS/HCC) [F03.90]  Yes   • Leukocytosis [D72.829]  Yes   • Acute urinary retention [R33.8]  Yes      Resolved Hospital Problems   No resolved problems to display.        Brief Hospital Course to date:  Rebecca Terrazas is a 70 y.o. female with advanced dementia who was previously been on hospice for 9 months before being discharged from their service who presented to the hospital with family concerns for increased agitation.  In the ED she was found to be retaining urine with rapid improvement in symptoms after having Ashley catheter placed.   She has undergone a spontaneous voiding trial, however due to her general condition she is unable to transfer to a bedside commode for scheduled/prompting voiding requiring replacement of Ashley catheter for symptom relief.  Family requested urology consultation despite explanation that there is nothing acutely to be done in the hospital, urology recommended leave the Ashley in place.  Palliative care was reconsulted who spoke with the family and subsequently consulted hospice who are following along to help manage medications for symptoms and coordinate disposition home with hospice services    Assessment/plan    Advanced dementia with acute agitation  Anxiety  -CT brain without acute process  -Behavior initially improved after placement of Ashley catheter  -Continue BuSpar  -Continue split dose of Seroquel  -Palliative/hospice following, patient now comfort measures and plan for discharge home with hospice, however, family want inpatient hospice now, but unsure if she will meet this requirement.   -Continue as needed morphine, Ativan    Acute urinary retention  Chronic immobility  -Continue Flomax, bethanechol  -Per family request urology was consulted, recommended continue Ashley catheter and above medicines     DVT prophylaxis:  Mechanical DVT prophylaxis orders are present.       Disposition: Clinical status is stable, hospice arranging home DME, anticipate discharge date of 8/5/2021 with home hospice.    CODE STATUS:   Code Status and Medical Interventions:   Ordered at: 08/02/21 1429     Level Of Support Discussed With:    Next of Kin (If No Surrogate)     Code Status:    No CPR     Medical Interventions (Level of Support Prior to Arrest):    Comfort Measures     Comments:    per daughter       Yoana Menezes MD  08/04/21

## 2021-08-04 NOTE — PLAN OF CARE
Problem: Adult Inpatient Plan of Care  Goal: Plan of Care Review  Outcome: Ongoing, Progressing   Goal Outcome Evaluation:   VSS on RA. Pt's family did not want staff to turn, or obtain  vitals. When administering med's pt choked on applesauce. Notified APRN. Pts daughter would like the pt to be inpatient hospice due to her and her father not being able to care for pt at home. Will continue to monitor.

## 2021-08-04 NOTE — PLAN OF CARE
Goal Outcome Evaluation:   Pt now admitted under inpatient hospice. Ativan and dilaudid scheduled and available PRN. Daughter and  at bedside. No telemetry monitor in place. Oral care and palliative mouth rinse PRN. No complaints at this time.  Problem: End-of-Life Care  Goal: Comfort, Peace and Preserved Dignity  Outcome: Ongoing, Progressing     Problem: Grieving  Goal: Expression of Grief  Outcome: Ongoing, Progressing     Problem: Pain Acute  Goal: Optimal Pain Control  Outcome: Ongoing, Progressing     Problem: Fall Injury Risk  Goal: Absence of Fall and Fall-Related Injury  Outcome: Ongoing, Progressing  Intervention: Promote Injury-Free Environment  Recent Flowsheet Documentation  Taken 8/4/2021 1600 by Sylvia Campbell RN  Safety Promotion/Fall Prevention:   activity supervised   assistive device/personal items within reach   clutter free environment maintained   nonskid shoes/slippers when out of bed   room organization consistent   safety round/check completed  Taken 8/4/2021 1400 by Sylvia Campbell, RN  Safety Promotion/Fall Prevention:   activity supervised   assistive device/personal items within reach   clutter free environment maintained   nonskid shoes/slippers when out of bed   room organization consistent   safety round/check completed  Taken 8/4/2021 1200 by Sylvia Campbell, RN  Safety Promotion/Fall Prevention:   activity supervised   assistive device/personal items within reach   clutter free environment maintained   nonskid shoes/slippers when out of bed   room organization consistent   safety round/check completed     Problem: Skin Injury Risk Increased  Goal: Skin Health and Integrity  Outcome: Ongoing, Progressing  Intervention: Optimize Skin Protection  Recent Flowsheet Documentation  Taken 8/4/2021 1600 by Sylvia Campbell RN  Pressure Reduction Techniques:   frequent weight shift encouraged   heels elevated off bed   positioned off wounds   pressure points protected  Head of Bed  (HOB): HOB at 30-45 degrees  Pressure Reduction Devices:   pressure-redistributing mattress utilized   positioning supports utilized   heel offloading device utilized  Skin Protection:   adhesive use limited   skin sealant/moisture barrier applied   skin-to-device areas padded   skin-to-skin areas padded   tubing/devices free from skin contact  Taken 8/4/2021 1400 by Sylvia Campbell RN  Pressure Reduction Techniques:   frequent weight shift encouraged   heels elevated off bed   positioned off wounds   pressure points protected  Head of Bed (HOB): HOB at 30-45 degrees  Pressure Reduction Devices:   pressure-redistributing mattress utilized   positioning supports utilized   heel offloading device utilized  Skin Protection:   adhesive use limited   skin sealant/moisture barrier applied   skin-to-device areas padded   skin-to-skin areas padded   tubing/devices free from skin contact  Taken 8/4/2021 1200 by Sylvia Campbell RN  Pressure Reduction Techniques:   frequent weight shift encouraged   heels elevated off bed   positioned off wounds   pressure points protected  Head of Bed (HOB): HOB at 30-45 degrees  Pressure Reduction Devices:   pressure-redistributing mattress utilized   positioning supports utilized   heel offloading device utilized  Skin Protection:   adhesive use limited   skin sealant/moisture barrier applied   skin-to-device areas padded   skin-to-skin areas padded   tubing/devices free from skin contact

## 2021-08-04 NOTE — PLAN OF CARE
Problem: Palliative Care  Goal: Enhanced Quality of Life  Intervention: Optimize Psychosocial Wellbeing  Flowsheets (Taken 8/4/2021 1148)  Supportive Measures:   active listening utilized   goal setting facilitated   problem-solving facilitated   positive reinforcement provided   verbalization of feelings encouraged  Family/Support System Care:   caregiver stress acknowledged   involvement promoted   presence promoted   self-care encouraged   support provided   Goal Outcome Evaluation:  Plan of Care Reviewed With: spouse, daughter  Problem: Adult Inpatient Plan of Care  Goal: Plan of Care Review  Outcome: Ongoing, Progressing  Flowsheets (Taken 8/4/2021 1148)  Progress: declining  Plan of Care Reviewed With:   spouse   daughter  Outcome Summary: Pt is lethargic with neck hyperextended. Pt unable to take any po food or liquids safely. Provide oral care with palliative rinse. Dtr Ella at the bedside reports pt is usually a busy person and was walking to relieve anxiety as well as seroquel. She sees pt to be restless and right arm with tremors as pt is uncomfortable. Pt will arch back and right arm does tremor at times with certain positions. Pt unable to take seroquel so APRn to schedule ativan. Pt had no underlying chronic pain. Plans to try voltaren gel and reposition for comfort. Pt has a remy with decreased urine output.  Plans to remove tele and pulse ox. Pt is on a comfort end of life plan. Inpt to see and eval pt.  palliative care will manage symptoms in the mean time.Life review with dtr and . Pt loved to shop and lived in [place] and also worked as a .            Progress: declining  Team Meeting 1300 Leona Medina DO LCSW, Nhi Yun APRN, Sofiya Leal RN CHPN, Blade Jennings MSW

## 2021-08-04 NOTE — PROGRESS NOTES
"Palliative Care Progress Note    Date of Admission: 7/31/2021    Code Status:   Current Code Status     Date Active Code Status Order ID Comments User Context       Prior    Advance Care Planning Activity        Subjective:  Daughter and  at bedside.   Patient has been exhibiting signs of discomfort with arching her back intermittently, appearing tense across shoulders and intermittent restless tremors of both upper extremities.   No facial grimaces or vocalizations.  Somnolent, unable to tolerate any po intake  Prns: morphine 2mg x6 and lorazepam 0.5mg x4.     Reviewed with nursing  Reviewed current scheduled and prn medications for route, type, dose, and frequency. Reviewed medical record  No current facility-administered medications for this encounter.    Objective:  PPS 10%  /85 (BP Location: Left arm, Patient Position: Lying)   Pulse 111   Temp 98.8 °F (37.1 °C) (Oral)   Resp 22   Ht 160 cm (62.99\")   Wt 59.3 kg (130 lb 12.8 oz)   LMP  (LMP Unknown)   SpO2 95%   BMI 23.18 kg/m²    Intake & Output (last day)       08/03 0701 - 08/04 0700 08/04 0701 - 08/05 0700    P.O.  0    Total Intake(mL/kg)  0 (0)    Urine (mL/kg/hr) 600 (0.4)     Stool 0     Total Output 600     Net -600 0          Stool Unmeasured Occurrence 1 x         Lab Results (last 24 hours)     ** No results found for the last 24 hours. **        Imaging Results (Last 24 Hours)     ** No results found for the last 24 hours. **        Physical Exam:  Gen: NAD, resting in bed  Skin: warm, dry   Eyes: VALERIE, conjunctivae clear and moist   HEENT: oropharynx dry, clear  Resp/thorax: even effort, nonlabored, CTA   CV: RRR   ABD: soft, bowel sounds +, nontender  : remy to bedside drainage with clear urine  Ext: no edema, no redness   Neuro: keeps eyes closed, intermittent hand/wrist tremors, no myoclonus     Reviewed labs and diagnostic results.   No results found for: HGBA1C  Results from last 7 days   Lab Units 08/01/21  0447 "   WBC 10*3/mm3 9.17   HEMOGLOBIN g/dL 13.4   HEMATOCRIT % 42.0   PLATELETS 10*3/mm3 266     Results from last 7 days   Lab Units 08/01/21 2045 08/01/21 0447 08/01/21 0447 07/31/21  0931 07/31/21  0931   SODIUM mmol/L  --   --  139   < > 142   POTASSIUM mmol/L 3.8   < > 3.4*   < > 3.4*   CHLORIDE mmol/L  --   --  107   < > 107   CO2 mmol/L  --   --  21.0*   < > 20.0*   BUN mg/dL  --   --  8   < > 8   CREATININE mg/dL  --   --  0.76   < > 0.88   CALCIUM mg/dL  --   --  8.9   < > 9.1   BILIRUBIN mg/dL  --   --   --   --  0.6   ALK PHOS U/L  --   --   --   --  151*   ALT (SGPT) U/L  --   --   --   --  9   AST (SGOT) U/L  --   --   --   --  22   GLUCOSE mg/dL  --   --  87   < > 114*    < > = values in this interval not displayed.       Impression: 70 y.o. female with advanced dementia, urinary retention    Plan: Comfort with end of life care    Restlessness, agitation - patient no longer to take po meds.   Scheduled lorazepam 0.5mg every 6 hours with prn  Prn morphine  Add diclofenac ointment for transverse shoulders/base neck.   Prn toradol with one dose now.     Goals of care discussion - reviewed with daughter and  processes with end of life, expected symptoms with dying process, use of medications and interventions to provide comfort and manage symptoms.  Reviewed process for inpatient hospice care.   Palliative team provide support to pt/family.  Reviewed and updated with nursing  Time: 50 minutes   > 50% time spent in counseling and education concerning current orders for symptom management with daughter and     Nhi Yun, APRN  [phone]  08/04/21  14:37 EDT

## 2021-08-04 NOTE — DISCHARGE INSTRUCTIONS
Hospice  Hospice is a service that is designed to provide people who are terminally ill and their families with medical, spiritual, and psychological support. Its aim is to improve your quality of life by keeping you as comfortable as possible in the final stages of life.  Who will be my providers when I begin hospice care?  Hospice teams often include:  · A nurse.  · A doctor. The hospice doctor will be available for your care, but you can include your regular doctor or nurse practitioner.  · A .  · A counselor.  · A  (such as a ).  · A dietitian.  · Therapists.  · Trained volunteers who can help with care.  What services does hospice provide?  Hospice services can vary depending on the center or organization. Generally, they include:  · Ways to keep you comfortable, such as:  ? Providing care in your home or in a home-like setting.  ? Working with your family and friends to help meet your needs.  ? Allowing you to enjoy the support of loved ones by receiving much of your basic care from family and friends.  · Pain relief and symptom management. The staff will supply all necessary medicines and equipment so that you can stay comfortable and alert enough to enjoy the company of your friends and family.  · Visits or care from a nurse and doctor. This may include 24-hour on-call services.  · Companionship when you are alone.  · Allowing you and your family to rest. Hospice staff may do light housekeeping, prepare meals, and run errands.  · Counseling. They will make sure your emotional, spiritual, and social needs are being met, as well as those needs of your family members.  · Spiritual care. This will be individualized to meet your needs and your family's needs. It may involve:  ? Helping you and your family understand the dying process.  ? Helping you say goodbye to your family and friends.  ? Performing a specific Oriental orthodox ceremony or ritual.  · Massage.  · Nutrition  therapy.  · Physical and occupational therapy.  · Short-term inpatient care, if something cannot be managed in the home.  · Art or music therapy.  · Bereavement support for grieving family members.  When should hospice care begin?  Most people who use hospice are believed to have less than 6 months to live.  · Your family and health care providers can help you decide when hospice services should begin.  · If you live longer than 6 months but your condition does not improve, your doctor may be able to approve you for continued hospice care.  · If your condition improves, you may discontinue the program.  What should I consider before selecting a program?  Most hospice programs are run by nonprofit, independent organizations. Some are affiliated with hospitals, nursing homes, or home health care agencies. Hospice programs can take place in your home or at a hospice center, hospital, or skilled nursing facility. When choosing a hospice program, ask the following questions:  · What services are available to me?  · What services will be offered to my loved ones?  · How involved will my loved ones be?  · How involved will my health care provider be?  · Who makes up the hospice care team? How are they trained or screened?  · How will my pain and symptoms be managed?  · If my circumstances change, can the services be provided in a different setting, such as my home or in the hospital?  · Is the program reviewed and licensed by the state or certified in some other way?  · What does it cost? Is it covered by insurance?  · If I choose a hospice center or nursing home, where is the hospice center located? Is it convenient for family and friends?  · If I choose a hospice center or nursing home, can my family and friends visit any time?  · Will you provide emotional and spiritual support?  · Who can my family call with questions?  Where can I learn more about hospice?  You can learn about existing hospice programs in your area  from your health care providers. You can also read more about hospice online. The websites of the following organizations have helpful information:  · National Hospice and Palliative Care Organization (NHPCO): www.nhpco.org  · National Association for Home Care & Hospice (NAHC): www.nahc.org  · H[employer]ice Foundation of [place] (HFA): www.hospicefoundation.org  · American Cancer Society (ACS): www.cancer.org  · Hospice Net: www.hospicenet.org  · [facility] Associations [facility] [place] (VNAA): www.vnaa.org  You may also find more information by contacting the following agencies:  · A local agency on aging.  · Your local [facility] chapter.  · Your state's department of health or .  Summary  · Hospice is a service that is designed to provide people who are terminally ill and their families with medical, spiritual, and psychological support.  · Hospice aims to improve your quality of life by keeping you as comfortable as possible in the final stages of life.  · Hospice teams often include a doctor, nurse, , counselor, ,dietitian, therapists, and volunteers.  · Hospice care generally includes medicine for symptom management, visits from doctors and nurses, physical and occupational therapy, nutrition counseling, spiritual and emotional counseling, caregiver support, and bereavement support for grieving family members.  · Hospice programs can take place in your home or at a hospice center, hospital, or skilled nursing facility.  This information is not intended to replace advice given to you by your health care provider. Make sure you discuss any questions you have with your health care provider.  Document Revised: 02/18/2021 Document Reviewed: 01/09/2018  Elsevier Patient Education © 2021 [employer]vier Inc.  Indwelling Urinary Catheter Care, Adult  An indwelling urinary catheter is a thin tube that is put into your bladder. The tube helps to drain pee (urine) out of your body. The tube  goes in through your urethra. Your urethra is where pee comes out of your body. Your pee will come out through the catheter, then it will go into a bag (drainage bag).  Take good care of your catheter so it will work well.  How to wear your catheter and bag  Supplies needed  · Sticky tape (adhesive tape) or a leg strap.  · Alcohol wipe or soap and water (if you use tape).  · A clean towel (if you use tape).  · Large overnight bag.  · Smaller bag (leg bag).  Wearing your catheter  Attach your catheter to your leg with tape or a leg strap.  · Make sure the catheter is not pulled tight.  · If a leg strap gets wet, take it off and put on a dry strap.  · If you use tape to hold the bag on your le. Use an alcohol wipe or soap and water to wash your skin where the tape made it sticky before.  2. Use a clean towel to pat-dry that skin.  3. Use new tape to make the bag stay on your leg.  Wearing your bags  You should have been given a large overnight bag.  · You may wear the overnight bag in the day or night.  · Always have the overnight bag lower than your bladder.  Do not let the bag touch the floor.  · Before you go to sleep, put a clean plastic bag in a wastebasket. Then hang the overnight bag inside the wastebasket.  You should also have a smaller leg bag that fits under your clothes.  · Always wear the leg bag below your knee.  · Do not wear your leg bag at night.  How to care for your skin and catheter  Supplies needed  · A clean washcloth.  · Water and mild soap.  · A clean towel.  Caring for your skin and catheter         · Clean the skin around your catheter every day:  1. Wash your hands with soap and water.  2. Wet a clean washcloth in warm water and mild soap.  3. Clean the skin around your urethra.  § If you are female:  § Gently spread the folds of skin around your vagina (labia).  § With the washcloth in your other hand, wipe the inner side of your labia on each side. Wipe from front to back.  § If you  are male:  § Pull back any skin that covers the end of your penis (foreskin).  § With the washcloth in your other hand, wipe your penis in small circles. Start wiping at the tip of your penis, then move away from the catheter.  § Move the foreskin back in place, if needed.  4. With your free hand, hold the catheter close to where it goes into your body.  § Keep holding the catheter during cleaning so it does not get pulled out.  5. With the washcloth in your other hand, clean the catheter.  § Only wipe downward on the catheter.  § Do not wipe upward toward your body. Doing this may push germs into your urethra and cause infection.  6. Use a clean towel to pat-dry the catheter and the skin around it. Make sure to wipe off all soap.  7. Wash your hands with soap and water.  · Shower every day. Do not take baths.  · Do not use cream, ointment, or lotion on the area where the catheter goes into your body, unless your doctor tells you to.  · Do not use powders, sprays, or lotions on your genital area.  · Check your skin around the catheter every day for signs of infection. Check for:  ? Redness, swelling, or pain.  ? Fluid or blood.  ? Warmth.  ? Pus or a bad smell.  How to empty the bag  Supplies needed  · Rubbing alcohol.  · Gauze pad or cotton ball.  · Tape or a leg strap.  Emptying the bag  Pour the pee out of your bag when it is ?-½ full, or at least 2-3 times a day. Do this for your overnight bag and your leg bag.  1. Wash your hands with soap and water.  2. Separate (detach) the bag from your leg.  3. Hold the bag over the toilet or a clean pail. Keep the bag lower than your hips and bladder. This is so the pee (urine) does not go back into the tube.  4. Open the pour spout. It is at the bottom of the bag.  5. Empty the pee into the toilet or pail. Do not let the pour spout touch any surface.  6. Put rubbing alcohol on a gauze pad or cotton ball.  7. Use the gauze pad or cotton ball to clean the pour  spout.  8. Close the pour spout.  9. Attach the bag to your leg with tape or a leg strap.  10. Wash your hands with soap and water.  Follow instructions for cleaning the drainage bag:  · From the product maker.  · As told by your doctor.  How to change the bag  Supplies needed  · Alcohol wipes.  · A clean bag.  · Tape or a leg strap.  Changing the bag  Replace your bag when it starts to leak, smell bad, or look dirty.  1. Wash your hands with soap and water.  2. Separate the dirty bag from your leg.  3. Pinch the catheter with your fingers so that pee does not spill out.  4. Separate the catheter tube from the bag tube where these tubes connect (at the connection valve). Do not let the tubes touch any surface.  5. Clean the end of the catheter tube with an alcohol wipe. Use a different alcohol wipe to clean the end of the bag tube.  6. Connect the catheter tube to the tube of the clean bag.  7. Attach the clean bag to your leg with tape or a leg strap. Do not make the bag tight on your leg.  8. Wash your hands with soap and water.  General rules    · Never pull on your catheter. Never try to take it out. Doing that can hurt you.  · Always wash your hands before and after you touch your catheter or bag. Use a mild, fragrance-free soap. If you do not have soap and water, use hand .  · Always make sure there are no twists or bends (kinks) in the catheter tube.  · Always make sure there are no leaks in the catheter or bag.  · Drink enough fluid to keep your pee pale yellow.  · Do not take baths, swim, or use a hot tub.  · If you are female, wipe from front to back after you poop (have a bowel movement).  Contact a doctor if:  · Your pee is cloudy.  · Your pee smells worse than usual.  · Your catheter gets clogged.  · Your catheter leaks.  · Your bladder feels full.  Get help right away if:  · You have redness, swelling, or pain where the catheter goes into your body.  · You have fluid, blood, pus, or a bad  smell coming from the area where the catheter goes into your body.  · Your skin feels warm where the catheter goes into your body.  · You have a fever.  · You have pain in your:  ? Belly (abdomen).  ? Legs.  ? Lower back.  ? Bladder.  · You see blood in the catheter.  · Your pee is pink or red.  · You feel sick to your stomach (nauseous).  · You throw up (vomit).  · You have chills.  · Your pee is not draining into the bag.  · Your catheter gets pulled out.  Summary  · An indwelling urinary catheter is a thin tube that is placed into the bladder to help drain pee (urine) out of the body.  · The catheter is placed into the part of the body that drains pee from the bladder (urethra).  · Taking good care of your catheter will keep it working properly and help prevent problems.  · Always wash your hands before and after touching your catheter or bag.  · Never pull on your catheter or try to take it out.  This information is not intended to replace advice given to you by your health care provider. Make sure you discuss any questions you have with your health care provider.  Document Revised: 04/10/2020 Document Reviewed: 08/03/2018  [employer] Patient Education © 2021 [employer]  Acute Urinary Retention, Female    Acute urinary retention means that you cannot pee (urinate) at all, or that you pee too little and your bladder is not emptied completely. If it is not treated, it can lead to kidney damage or other serious problems.  Follow these instructions at home:  · Take over-the-counter and prescription medicines only as told by your doctor. Ask your doctor what medicines you should stay away from. Do not take any medicine unless your doctor says it is okay to do so.  · If you were sent home with a tube that drains pee from the bladder (catheter), take care of it as told by your doctor.  · Drink enough fluid to keep your pee clear or pale yellow.  · If you were given an antibiotic, take it as told by your doctor. Do not  stop taking the antibiotic even if you start to feel better.  · Do not use any products that contain nicotine or tobacco, such as cigarettes and e-cigarettes. If you need help quitting, ask your doctor.  · Watch for changes in your symptoms. Tell your doctor about them.  · If told, keep track of any changes in your blood pressure at home. Tell your doctor about them.  · Keep all follow-up visits as told by your doctor. This is important.  Contact a doctor if:  · You have spasms or you leak pee when you have spasms.  Get help right away if:  · You have chills or a fever.  · You have blood in your pee.  · You have a tube that drains the bladder and:  ? The tube stops draining pee.  ? The tube falls out.  Summary  · Acute urinary retention means that you cannot pee at all, or that you pee too little and your bladder is not emptied completely. If it is not treated, it can result in kidney damage or other serious problems.  · If you were sent home with a tube that drains pee from the bladder, take care of it as told by your doctor.  · Pay attention to any changes in your symptoms. Tell your doctor about them.  This information is not intended to replace advice given to you by your health care provider. Make sure you discuss any questions you have with your health care provider.  Document Revised: 11/30/2018 Document Reviewed: 01/19/2018  Elsevier Patient Education © 2021 Elsevier Inc.

## 2021-08-05 NOTE — SIGNIFICANT NOTE
Exam confirms with auscultation zero audible heart tones and zero audible respirations. Mrs. Rebecca Terrazas was pronounced dead at 1851.  MD notified by Patient's RN.    Bette Knight RN  Clinical House Supervisor  8/5/2021 1851

## 2021-08-05 NOTE — PLAN OF CARE
Goal Outcome Evaluation:   Patient seems comfortable after PRNs given. Family has remained at bedside and they are refusing for patient to be turned. Continuing to monitor.

## 2021-08-05 NOTE — H&P
Hospice History and Physical     Patient Name:  Rebecca Terrazas   : 1950   Sex: female    Patient Care Team:  Delroy Boswell MD as PCP - General  Delroy Boswell MD as PCP - Family Medicine    Code Status: Comfort Measures    Subjective     Per Hospitalist Note on :     Rebecca Terrazas is a 70 y.o. female with advanced dementia who was previously been on hospice for 9 months before being discharged from their service who presented to the hospital with family concerns for increased agitation.  In the ED she was found to be retaining urine with rapid improvement in symptoms after having Ashley catheter placed.  She has undergone a spontaneous voiding trial, however due to her general condition she is unable to transfer to a bedside commode for scheduled/prompting voiding requiring replacement of Ashley catheter for symptom relief... Palliative care was reconsulted who spoke with the family and subsequently consulted hospice who are following along to help manage medications for symptoms and coordinate disposition home with hospice services.    [end of copied text]    Pt with acute decline overnight and thus was transferred to in Hospice on 2021 for mgmt of acute symptoms 2/2 senile degeneration of the brain.     Overnight called d/t discomfort; medication changes made for comfort d/t changing needs. Today, loving family at bedside.     Prns:  Robinul 0.2mg x3  Dilaudid 1mg x5  Toradol 15mg x1  Ativan 1mg x2  Phenobarbital 65mg x1    LBM: 8/3, small      Review of Systems  Review of Systems   Unable to perform ROS: Patient unresponsive       History  Past Medical History:   Diagnosis Date   • Anxiety    • Dementia (CMS/HCC)    • Depression    • Hearing loss    • Hyperlipidemia    • Hypertension    • Vertigo      Past Surgical History:   Procedure Laterality Date   • HYSTERECTOMY       Current Facility-Administered Medications   Medication Dose Route Frequency Provider Last Rate Last Admin   •  acetaminophen (TYLENOL) suppository 650 mg  650 mg Rectal Q4H PRN Julee Aranda H, APRN       • bisacodyl (DULCOLAX) suppository 10 mg  10 mg Rectal Daily PRN Julee Aranda, APRN       • diphenhydrAMINE (BENADRYL) injection 50 mg  50 mg Intravenous Q6H PRN Julee Aranda H, APRN       • furosemide (LASIX) injection 20 mg  20 mg Intravenous Q6H PRN Julee Aranda, APRN       • furosemide (LASIX) injection 20 mg  20 mg Intravenous Q8H Julee Aranda, APRN   20 mg at 08/05/21 1042   • glycopyrrolate (ROBINUL) injection 0.2 mg  0.2 mg Intravenous Q4H PRN Julee Aranda, APRN   0.2 mg at 08/05/21 0551   • haloperidol lactate (HALDOL) injection 5 mg  5 mg Intravenous Q4H PRN Julee Aranda, APRN       • HYDROmorphone (DILAUDID) injection 1 mg  1 mg Intravenous Q4H Julee Aranda, APRN   1 mg at 08/05/21 1423   • HYDROmorphone (DILAUDID) injection 1 mg  1 mg Intravenous Q30 Min PRN Julee Aranda, APRN   1 mg at 08/05/21 0835   • ketorolac (TORADOL) injection 15 mg  15 mg Intravenous Q6H PRN Julee Aranda, APRN   15 mg at 08/04/21 2258   • LORazepam (ATIVAN) injection 1 mg  1 mg Intravenous Q4H Julee Aranda, APRN   1 mg at 08/05/21 1423   • LORazepam (ATIVAN) injection 1 mg  1 mg Intravenous Q2H PRN Julee Aranda, APRN       • ondansetron (ZOFRAN) injection 4 mg  4 mg Intravenous Q6H PRN Julee Aranda, APRN       • palliative care oral rinse   Mouth/Throat PRN Julee Aranda, APRN       • palliative care oral rinse   Mouth/Throat Q4H Julee Aranda, APRN   Given at 08/05/21 0221   • PHENobarbital injection 65 mg  65 mg Intravenous Q4H PRN Julee Aranda APRN       • polyvinyl alcohol (LIQUIFILM) 1.4 % ophthalmic solution 2 drop  2 drop Both Eyes Q1H PRN Julee Aranda APRN       • promethazine (PHENERGAN) suppository 25 mg  25 mg Rectal Q4H PRN Julee Aranda APRN       • scopolamine patch 1 mg/72 hr  1 patch Transdermal Q72H PRN Julee Aranda  H, APRN   1 patch at 08/04/21 2236        •  acetaminophen  •  bisacodyl  •  diphenhydrAMINE  •  furosemide  •  glycopyrrolate  •  haloperidol lactate  •  HYDROmorphone  •  ketorolac  •  LORazepam  •  ondansetron  •  palliative care oral rinse  •  PHENobarbital  •  polyvinyl alcohol  •  promethazine  •  Scopolamine  Allergies   Allergen Reactions   • Erythromycin Nausea And Vomiting     Family History   Problem Relation Age of Onset   • Heart disease Father    • Mental illness Sister    • Breast cancer Neg Hx    • Ovarian cancer Neg Hx      Social History     Socioeconomic History   • Marital status:      Spouse name: Not on file   • Number of children: Not on file   • Years of education: Not on file   • Highest education level: Not on file   Tobacco Use   • Smoking status: Never Smoker   • Smokeless tobacco: Never Used   Substance and Sexual Activity   • Alcohol use: Yes   • Drug use: No   • Sexual activity: Defer       Objective     Vital Signs       PPS: Palliative Performance Scale score as of 8/5/2021, 16:02 EDT is 10% based on the following measures:   Ambulation: Totally bed bound  Activity and Evidence of Disease: Unable to do any work, extensive evidence of disease  Self-Care: Total care  Intake:  Mouth care only  LOC: Drowsy or coma      Physical Exam:  Physical Exam  Constitutional:       General: She is not in acute distress.     Appearance: She is ill-appearing.   Eyes:      Comments: Eyes remain closed   Cardiovascular:      Comments: No carotid bruit; no edema BUE  Pulmonary:      Comments: No audible congestion; respiration are nonlabored; rhythmic; shallow. Mouth open in relaxed manner.   Abdominal:      General: There is no distension.   Musculoskeletal:      Cervical back: Neck supple.   Skin:     Coloration: Skin is pale.   Neurological:      Comments: Does not follow commands   Psychiatric:      Comments: No facial grimacing; no moaning       Results Review:   No results found for:  HGBA1C    Lab Results   Component Value Date    GLUCOSE 87 08/01/2021    BUN 8 08/01/2021    CREATININE 0.76 08/01/2021    EGFRIFNONA 75 08/01/2021    BCR 10.5 08/01/2021    K 3.8 08/01/2021    CO2 21.0 (L) 08/01/2021    CALCIUM 8.9 08/01/2021    ALBUMIN 4.00 07/31/2021    AST 22 07/31/2021    ALT 9 07/31/2021         Senile degeneration of brain (CMS/[facility])      Assessment/Plan   Assessment/Plan:     70yoF admitted in Hospice 8/4 for senile degeneration of brain.     AMS  Pain, nos, existential  Dyspnea  Anxiety  EOLC    - Continue current plan of care as follows:    Lasix 20mg q8h    Dilaudid 1mg q4h    Ativan 1mg q4h    Palliative oral rinse scheduled and prn    - Prns reviewed/adjusted for comfort    Discussion at bedside with  and dtr; support provided; Hospice contact information shared.     Coordinated care with Nursing and Hospice IDT.     Total Visit Time: 55min  Face to Face Time: 20min    Justification for care:  Patient meets criteria for acute in-patient care with required nursing assessment and interventions for symptoms with IV medications.      Julee Aranda, BRII, MHA, APRN  [facility]  Hospice and Palliative Care Nurse Practitioner  08/05/21  15:59 EDT

## 2021-08-06 NOTE — DISCHARGE SUMMARY
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 female with advanced dementia who was previously been on hospice for 9 months before being discharged from their service who presented to the hospital with family concerns for increased agitation.  In the ED she was found to be retaining urine with rapid improvement in symptoms after having Ashley catheter placed.  She has undergone a spontaneous voiding trial, however due to her general condition she is unable to transfer to a bedside commode for scheduled/prompting voiding requiring replacement of Ashley catheter for symptom relief... Palliative care was reconsulted who spoke with the family and subsequently consulted hospice who are following along to help manage medications for symptoms and coordinate disposition home with hospice services.     [end of copied text]     Pt with acute decline overnight and thus was transferred to in Hospice on 8/4/2021 for mgmt of acute symptoms 2/2 senile degeneration of the brain.       Social History:  Social History     Tobacco Use   • Smoking status: Never Smoker   • Smokeless tobacco: Never Used   Substance Use Topics   • Alcohol use: Yes         Consults:   Consults     Date and Time Order Name Status Description    8/2/2021 11:17 AM Inpatient Palliative Care MD Consult Completed     8/2/2021 11:17 AM Inpatient Urology Consult Completed           Exam confirms with auscultation zero audible heart tones and zero audible respirations. Mrs. Rebecca Terrazas was pronounced dead at 1851.  MD notified by Patient's RN.     Bette Knight RN  Clinical House Supervisor  8/5/2021 1851      Julee Aranda, BRII, MHA, APRN  [facility]  Hospice and Palliative Care Nurse Practitioner  08/05/21  20:29 EDT

## 2021-08-12 NOTE — DISCHARGE SUMMARY
[facility] Medicine Services  DISCHARGE SUMMARY    Patient Name: Rebecca Terrazas  : 1950  MRN: [id number]    Date of Admission: 2021  9:11 AM  Date of Discharge:  2021  Primary Care Physician: Delroy Boswell MD    Consults     Date and Time Order Name Status Description    2021 11:17 AM Inpatient Palliative Care MD Consult Completed     2021 11:17 AM Inpatient Urology Consult Completed           Hospital Course     Presenting Problem:   Acute encephalopathy [G93.40]    Active Hospital Problems    Diagnosis  POA   • **Acute encephalopathy [G93.40]  Yes   • Hypokalemia [E87.6]  Yes   • Dementia (CMS/HCC) [F03.90]  Yes   • Leukocytosis [D72.829]  Yes   • Acute urinary retention [R33.8]  Yes      Resolved Hospital Problems   No resolved problems to display.          Hospital Course:  Rebecca Terrazas is a 70 y.o. female with advanced dementia who was previously been on hospice for 9 months before being discharged from their service who presented to the hospital with family concerns for increased agitation.  In the ED she was found to be retaining urine with rapid improvement in symptoms after having Ashley catheter placed.  She has undergone a spontaneous voiding trial, however due to her general condition she is unable to transfer to a bedside commode for scheduled/prompting voiding requiring replacement of Ashley catheter for symptom relief.  Family requested urology consultation despite explanation that there is nothing acutely to be done in the hospital, urology recommended leave the Ashley in place.  Palliative care was reconsulted who spoke with the family and subsequently consulted hospice who are following along to help manage medications for symptoms and coordinate disposition to transfer to inpatient hospice          Advanced dementia with acute agitation  Anxiety  -CT brain without acute process  -Behavior initially improved after placement of Ashley  catheter  -Continue BuSpar  -Continue split dose of Seroquel  -Palliative/hospice following, patient now comfort measures and transferred to inpatient hospice  -Continue as needed morphine, Ativan     Acute urinary retention               Day of Discharge     HPI:   See day of discharge progress note    Review of Systems  See day of discharge progress note    Vital Signs:         Physical Exam:  See day of discharge progress note    Pertinent  and/or Most Recent Results     LAB RESULTS:                              Brief Urine Lab Results  (Last result in the past 365 days)      Color   Clarity   Blood   Leuk Est   Nitrite   Protein   CREAT   Urine HCG        07/31/21 0943 Yellow Clear Negative Negative Negative Negative             Microbiology Results (last 10 days)     ** No results found for the last 240 hours. **          No radiology results for the last 10 days                Plan for Follow-up of Pending Labs/Results:     Discharge Details        Discharge Medications      ASK your doctor about these medications      Instructions Start Date   busPIRone 5 MG tablet  Commonly known as: BUSPAR   Take 1 po bid scheduled, may take 1 q4 prn anxiety      estradiol 0.5 MG tablet  Commonly known as: ESTRACE  Ask about: Which instructions should I use?   0.5 mg, Oral, Daily      mirtazapine 30 MG tablet  Commonly known as: REMERON  Ask about: Which instructions should I use?   30 mg, Oral, Nightly      multivitamin with minerals tablet tablet   1 tablet, Oral, Daily      SEROquel 25 MG tablet  Generic drug: QUEtiapine   25 mg, Oral, Nightly             Allergies   Allergen Reactions   • Erythromycin Nausea And Vomiting         Discharge Disposition:  Hospice/Medical Facility ([facility] [place])    Diet:  Hospital:No active diet order      Activity:      Restrictions or Other Recommendations:         CODE STATUS:    Code Status and Medical Interventions:   Ordered at: 08/02/21 1040     Level Of Support Discussed  With:    Next of Kin (If No Surrogate)     Code Status:    No CPR     Medical Interventions (Level of Support Prior to Arrest):    Comfort Measures     Comments:    per daughter       No future appointments.              Yoana Menezes MD  08/12/21      Time Spent on Discharge:  I spent  5  minutes on this discharge activity which included: face-to-face encounter with the patient, reviewing the data in the system, coordination of the care with the nursing staff as well as consultants, documentation, and entering orders.
